# Patient Record
Sex: FEMALE | Race: BLACK OR AFRICAN AMERICAN | Employment: FULL TIME | URBAN - METROPOLITAN AREA
[De-identification: names, ages, dates, MRNs, and addresses within clinical notes are randomized per-mention and may not be internally consistent; named-entity substitution may affect disease eponyms.]

---

## 2017-02-27 ENCOUNTER — TELEPHONE (OUTPATIENT)
Dept: OBGYN CLINIC | Age: 27
End: 2017-02-27

## 2017-02-27 RX ORDER — DESOGESTREL AND ETHINYL ESTRADIOL 0.15-0.03
1 KIT ORAL DAILY
Qty: 4 PACKAGE | Refills: 0 | Status: SHIPPED | OUTPATIENT
Start: 2017-02-27 | End: 2017-06-08 | Stop reason: SDUPTHER

## 2017-02-27 NOTE — TELEPHONE ENCOUNTER
Notified pt with MD recommendation. She verbalized understanding and stated she will call back to schedule her AE.

## 2017-02-27 NOTE — TELEPHONE ENCOUNTER
Pt called requesting to have a RX for OCP Desogen sent to her [address] on [employer] [address]. Stated she has been off the [facility] pill since May 2016 and had the Nexplanon removed 12/27/2016. Advised pt to take a UPT test being that she was not using any contraception. She verbalized understanding.

## 2017-06-08 ENCOUNTER — TELEPHONE (OUTPATIENT)
Dept: OBGYN CLINIC | Age: 27
End: 2017-06-08

## 2017-06-08 RX ORDER — DESOGESTREL AND ETHINYL ESTRADIOL 0.15-0.03
1 KIT ORAL DAILY
Qty: 2 PACKAGE | Refills: 0 | Status: SHIPPED | OUTPATIENT
Start: 2017-06-08 | End: 2017-07-17

## 2017-07-17 ENCOUNTER — OFFICE VISIT (OUTPATIENT)
Dept: OBGYN CLINIC | Age: 27
End: 2017-07-17

## 2017-07-17 VITALS
BODY MASS INDEX: 26.68 KG/M2 | HEIGHT: 67 IN | SYSTOLIC BLOOD PRESSURE: 112 MMHG | WEIGHT: 170 LBS | DIASTOLIC BLOOD PRESSURE: 78 MMHG

## 2017-07-17 DIAGNOSIS — Z01.419 ENCOUNTER FOR GYNECOLOGICAL EXAMINATION (GENERAL) (ROUTINE) WITHOUT ABNORMAL FINDINGS: Primary | ICD-10-CM

## 2017-07-17 DIAGNOSIS — Z11.3 SCREENING EXAMINATION FOR VENEREAL DISEASE: ICD-10-CM

## 2017-07-17 NOTE — PROGRESS NOTES
Kathy Leon is a ,  32 y.o. female [address] whose Patient's last menstrual period was 2017 (within days). who presents for her annual checkup. She wants to discuss other BC options due to recent outbreak of hives and was seen in the ER. With regard to the Gardisil vaccine, she is older than the FDA approved age to receive it. Menstrual status:    Her periods are moderate. She is using three to five pads or tampons per day, usually regular and occurs 26-30 days. She denies dysmenorrhea. She reports no premenstrual symptoms. Contraception:    The current method of family planning is condoms sometimes and wants contraception and counseling. Sexual history:    She  reports that she currently engages in sexual activity and has had male partners. She reports using the following method of birth control/protection: None. Medical conditions:    Since her last annual GYN exam about one year ago, she has not the following changes in her health history: none. Pap and Mammogram History:    Her most recent Pap smear was normal obtained 1 year(s) ago. The patient has never had a mammogram.    The patient does have a family history of breast cancer. Past Medical History:   Diagnosis Date    Chlamydia 2014    ESCOBAR II (cervical intraepithelial neoplasia II)     Dysplasia of cervix, low grade (ESCOBAR 1)      Past Surgical History:   Procedure Laterality Date    HX ACL RECONSTRUCTION  2008    HX COLPOSCOPY      4/28/10- ecto ESCOBAR 2----12 ESCOBAR 1---13-negative    HX LAP CHOLECYSTECTOMY  10/04/2016    Laparoscopic cholecystectomy by Dr. Javier Sifuentes.  HX ORTHOPAEDIC      left ACL surgery    HX OTHER SURGICAL      HX TONSILLECTOMY           Allergies: Depo-provera [medroxyprogesterone]; Reclipsen (28) [desogestrel-ethinyl estradiol];  Sulfa (sulfonamide antibiotics); and Pcn [penicillins]   Social History     Social History    Marital status: SINGLE     Spouse name: N/A    Number of children: N/A    Years of education: N/A     Occupational History    Not on file. Social History Main Topics    Smoking status: Former Smoker    Smokeless tobacco: Never Used    Alcohol use Yes    Drug use: No    Sexual activity: Yes     Partners: Male     Birth control/ protection: None     Other Topics Concern    Not on file     Social History Narrative     Tobacco History:  reports that she has quit smoking. She has never used smokeless tobacco.  Alcohol Abuse:  reports that she drinks alcohol. Drug Abuse:  reports that she does not use illicit drugs.     Patient Active Problem List   Diagnosis Code    Environmental allergies Z91.09    Supervision of other normal pregnancy Z34.80       Review of Systems - History obtained from the patient  Constitutional: negative for weight loss, fever, night sweats  HEENT: negative for hearing loss, earache, congestion, snoring, sorethroat  CV: negative for chest pain, palpitations, edema  Resp: negative for cough, shortness of breath, wheezing  GI: negative for change in bowel habits, abdominal pain, black or bloody stools  : negative for frequency, dysuria, hematuria, vaginal discharge  MSK: negative for back pain, joint pain, muscle pain  Breast: negative for breast lumps, nipple discharge, galactorrhea  Skin :negative for itching, rash, hives  Neuro: negative for dizziness, headache, confusion, weakness  Psych: negative for anxiety, depression, change in mood  Heme/lymph: negative for bleeding, bruising, pallor    Physical Exam    Visit Vitals    /78    Ht 5' 7\" (1.702 m)    Wt 170 lb (77.1 kg)    LMP 07/02/2017 (Within Days)    Breastfeeding No    BMI 26.63 kg/m2       Constitutional  · Appearance: well-nourished, well developed, alert, in no acute distress    HENT  · Head and Face: appears normal    Neck  · Inspection/Palpation: normal appearance, no masses or tenderness  · Lymph Nodes: no lymphadenopathy present  · Thyroid: gland size normal, nontender, no nodules or masses present on palpation    Chest  · Respiratory Effort: breathing normal  · Auscultation: normal breath sounds    Cardiovascular  · Heart:  · Auscultation: regular rate and rhythm without murmur    Breasts  · Inspection of Breasts: breasts symmetrical, no skin changes, no discharge present, nipple appearance normal, no skin retraction present  · Palpation of Breasts and Axillae: no masses present on palpation, no breast tenderness  · Axillary Lymph Nodes: no lymphadenopathy present    Gastrointestinal  · Abdominal Examination: abdomen non-tender to palpation, normal bowel sounds, no masses present  · Liver and spleen: no hepatomegaly present, spleen not palpable  · Hernias: no hernias identified    Genitourinary  · External Genitalia: normal appearance for age, no discharge present, no tenderness present, no inflammatory lesions present, no masses present, no atrophy present  · Vagina: normal vaginal vault without central or paravaginal defects, no discharge present, no inflammatory lesions present, no masses present  · Bladder: non-tender to palpation  · Urethra: appears normal  · Cervix: normal   · Uterus: normal size, shape and consistency  · Adnexa: no adnexal tenderness present, no adnexal masses present  · Perineum: perineum within normal limits, no evidence of trauma, no rashes or skin lesions present  · Anus: anus within normal limits, no hemorrhoids present  · Inguinal Lymph Nodes: no lymphadenopathy present    Skin  · General Inspection: no rash, no lesions identified    Neurologic/Psychiatric  · Mental Status:  · Orientation: grossly oriented to person, place and time  · Mood and Affect: mood normal, affect appropriate    . Assessment:  Routine gynecologic examination  Her current medical status is satisfactory with no evidence of significant gynecologic issues.     Plan:  Counseled re: diet, exercise, healthy lifestyle  Return for yearly wellness visits  Call with period for [facility]

## 2017-07-17 NOTE — PATIENT INSTRUCTIONS
Pelvic Exam: Care Instructions  Your Care Instructions    When your doctor examines all of your pelvic organs, it's called a pelvic exam. Two good reasons to have this kind of exam are to check for sexually transmitted infections (STIs) and to get a Pap test. A Pap test is also called a Pap smear. It checks for early changes that can lead to cancer of the cervix. Sometimes a pelvic exam is part of a regular checkup. In this case, you can do some things to make your test results as accurate as possible. · Try to schedule the exam when you don't have your period. · Don't use douches, tampons, or vaginal medicines, sprays, or powders for 24 hours before your exam.  · Don't have sex for 24 hours before your exam.  Other times, women have this kind of exam at any time of the month. This is because they have pelvic pain, bleeding, or discharge. Or they may have another pelvic problem. Before your exam, it's important to share some information with your doctor. For example, if you are a survivor of rape or sexual abuse, you can talk about any concerns you may have. Your doctor will also want to know if you are pregnant or use birth control. And he or she will want to hear about any problems, surgeries, or procedures you have had in your pelvic area. You will also need to tell your doctor when your last period was. Follow-up care is a key part of your treatment and safety. Be sure to make and go to all appointments, and call your doctor if you are having problems. It's also a good idea to know your test results and keep a list of the medicines you take. How is a pelvic exam done? · During a pelvic exam, you will:  ¨ Take off your clothes below the waist. You will get a paper or cloth cover to put over the lower half of your body. Ricardo Cuevas on your back on an exam table. Your feet will be raised above you. Stirrups will support your feet. · The doctor will:  [address] [place] you to relax your knees.  Your knees need to lean out, toward the walls. ¨ Check the opening of your vagina for sores or swelling. ¨ Gently put a tool called a speculum into your vagina. It opens the vagina a little bit. You will feel some pressure. But if you are relaxed, it will not hurt. It lets your doctor see inside the vagina. ¨ Use a small brush, spatula, or swab to get a sample of cells, if you are having a Pap test or culture. The doctor then removes the speculum. ¨ Put on gloves and put one or two fingers of one hand into your vagina. The other hand goes on your lower belly. This lets your doctor feel your pelvic organs. You will probably feel some pressure. Try to stay relaxed. ¨ Put one gloved finger into your rectum and one into your vagina, if needed. This can also help check your pelvic organs. This exam takes about 10 minutes. At the end, you will get a washcloth or tissue to clean your vaginal area. It's normal to have some discharge after this exam. You can then get dressed. Some test results may be ready right away. But results from a culture or a Pap test may take several days or a few weeks. Why should you have a pelvic exam?  · You want to have recommended screening tests. This includes a Pap test.  · You think you have a vaginal infection. Signs include itching, burning, or unusual discharge. · You might have been exposed to a sexually transmitted infection (STI), such as chlamydia or herpes. · You have vaginal bleeding that is not part of your normal menstrual period. · You have pain in your belly or pelvis. · You have been sexually assaulted. A pelvic exam lets your doctor collect evidence and check for STIs. · You are pregnant. · You are having trouble getting pregnant. What are the risks of a pelvic exam?  There are no risks from a pelvic exam.  When should you call for help?   Watch closely for changes in your health, and be sure to contact your doctor if:  · You have heavy bleeding or discharge from your vagina after the exam.  Where can you learn more? Go to http://ama-alyson.info/. Enter W407 in the search box to learn more about \"Pelvic Exam: Care Instructions. \"  Current as of: October 13, 2016  Content Version: 11.3  © 9897-0717 [employer]. Care instructions adapted under license by [employer] (which disclaims liability or warranty for this information). If you have questions about a medical condition or this instruction, always ask your healthcare professional. [address] any warranty or liability for your use of this information.

## 2017-07-19 LAB
C TRACH RRNA SPEC QL NAA+PROBE: NEGATIVE
N GONORRHOEA RRNA SPEC QL NAA+PROBE: NEGATIVE
T VAGINALIS RRNA SPEC QL NAA+PROBE: NEGATIVE

## 2017-08-02 ENCOUNTER — TELEPHONE (OUTPATIENT)
Dept: OBGYN CLINIC | Age: 27
End: 2017-08-02

## 2017-08-02 NOTE — TELEPHONE ENCOUNTER
Patient stating that she has been waiting for her period to start so that she can have the IUD inserted and patient states that she has not had her period and is 10 days late with the exception of spotting that lasted 1 hour only with wiping and stopped. Patient thought this was her period, but since it stopped, she has not had any bleeding since. Patient has taken a UPT with a negative result. Patient is using Condoms to prevent pregnancy. Patient is going out of town starting tomorrow and will not be back until 08/10/2017. Please advise.

## 2017-08-18 ENCOUNTER — OFFICE VISIT (OUTPATIENT)
Dept: OBGYN CLINIC | Age: 27
End: 2017-08-18

## 2017-08-18 VITALS
BODY MASS INDEX: 26.68 KG/M2 | WEIGHT: 170 LBS | HEIGHT: 67 IN | DIASTOLIC BLOOD PRESSURE: 60 MMHG | SYSTOLIC BLOOD PRESSURE: 110 MMHG

## 2017-08-18 DIAGNOSIS — O20.0 THREATENED ABORTION: Primary | ICD-10-CM

## 2017-08-18 DIAGNOSIS — N92.6 MISSED MENSES: ICD-10-CM

## 2017-08-18 NOTE — PROGRESS NOTES
Threatened AB note    Bud Li is a ,  32 y.o. female [address] Patient's last menstrual period was 2017. making her 6 weeks pregnant. She has not had prenatal care. She had just stopped OC. Had multiple neg pregnancy tests before the positive    She presents with some spotting and cramping that started 17. The amount of bleeding is described as spotting. Patient states it is mostly after IC. She had spotting on  and 17. The cramps are described as minimal. She has not passed tissue. She had a positive pregnancy test 17. She has had a recent pelvic ultrasound that showed TA ULTRASOUND PERFORMED. A 5W0D GESTATIONAL SAC IS SEEN. NO FETAL POLE IS SEEN AT THIS TIME. GESTATIONAL AGE BASED ON TODAYS US. NO YOLK SAC IS SEEN. RIGHT OVARY APPEARS TO HAVE A FOLLICULAR CYST. RIGHT OVARY APPEARS TO HAVE A THICK  WALLED CYST THAT MEASURES 1.9 X 1.0 X 1.2CM. BLOODFLOW IS SEEN IN THE PERIPHERY. AN  ECTOPIC PREGNANCY CANNOT BE RULED OUT. LEFT OVARY APPEARS WITHIN NORMAL LIMITS. NO FREE FLUID SEEN IN THE CDS. [place] Her past medical history is not significant for risk factors for ectopic pregnancy. She has not had pelvic pain. The patient specifically denies right or left pelvic pain. She does not have a history of a spontaneous . She has not had a recent injury or trauma. Additional complaints: none.      Past Medical History:   Diagnosis Date    Chlamydia 2014    ESCOBAR II (cervical intraepithelial neoplasia II)     Dysplasia of cervix, low grade (ESCOBAR 1)      Past Surgical History:   Procedure Laterality Date    HX ACL RECONSTRUCTION  2008    HX COLPOSCOPY      4/28/10- ecto ESCOBAR 2----12 ESCOBAR 1---13-negative    HX LAP CHOLECYSTECTOMY  10/04/2016    Laparoscopic cholecystectomy by Dr. Rachel Griffith.  HX ORTHOPAEDIC      left ACL surgery    HX OTHER SURGICAL      HX TONSILLECTOMY       Social History     Occupational History    Not on file.      Social History Main Topics    Smoking status: Former Smoker    Smokeless tobacco: Never Used    Alcohol use Yes    Drug use: No    Sexual activity: Yes     Partners: Male     Birth control/ protection: None     Family History   Problem Relation Age of Onset    No Known Problems Mother     No Known Problems Father     Diabetes Maternal Uncle     Hypertension Maternal Grandmother        Allergies   Allergen Reactions    Depo-Provera [Medroxyprogesterone] Hives    Reclipsen (29) [Desogestrel-Ethinyl Estradiol] Hives    Sulfa (Sulfonamide Antibiotics) Hives     As child    Pcn [Penicillins] Hives     Prior to Admission medications    Not on File        Review of Systems: History obtained from the patient  Constitutional: negative for weight loss, fever, night sweats  Breast: negative for breast lumps, nipple discharge, galactorrhea  GI: negative for change in bowel habits, abdominal pain, black or bloody stools  : negative for frequency, dysuria, hematuria, vaginal discharge  MSK: negative for back pain, joint pain, muscle pain  Skin: negative for itching, rash, hives  Psych: negative for anxiety, depression, change in mood      Objective:  Visit Vitals    /60 (BP 1 Location: Right arm, BP Patient Position: Sitting)    Ht 5' 7\" (1.702 m)    Wt 170 lb (77.1 kg)    LMP 07/02/2017    Breastfeeding No    BMI 26.63 kg/m2       Physical Exam:   PHYSICAL EXAMINATION    Constitutional  · Appearance: well-nourished, well developed, alert, in no acute distress    Gastrointestinal  · Abdominal Examination: abdomen non-tender to palpation, normal bowel sounds, no masses present  · Liver and spleen: no hepatomegaly present, spleen not palpable  · Hernias: no hernias identified    Genitourinary  · External Genitalia: normal appearance for age, no discharge present, no tenderness present, no inflammatory lesions present, no masses present, no atrophy present  · Vagina: normal vaginal vault without central or paravaginal defects, bloody discharge present, no inflammatory lesions present, no masses present  · Bladder: non-tender to palpation  · Urethra: appears normal  · Cervix: normal   · Uterus: enlarged, soft, mildly tender with normal shape and consistency  · Adnexa: no adnexal tenderness present, no adnexal masses present  · Perineum: perineum within normal limits, no evidence of trauma, no rashes or skin lesions present  · Anus: anus within normal limits, no hemorrhoids present  · Inguinal Lymph Nodes: no lymphadenopathy present    Skin  · General Inspection: no rash, no lesions identified    Neurologic/Psychiatric  · Mental Status:  · Orientation: grossly oriented to person, place and time  · Mood and Affect: mood normal, affect appropriate    Assessment:   Early pregnancy  Adnexal cyst - cannot r/o ectopic right  Rh pos  Plan:   Repeat US in one week  Ectopic prec given  [facility]

## 2017-08-18 NOTE — PATIENT INSTRUCTIONS
Pelvic Exam: Care Instructions  Your Care Instructions    When your doctor examines all of your pelvic organs, it's called a pelvic exam. Two good reasons to have this kind of exam are to check for sexually transmitted infections (STIs) and to get a Pap test. A Pap test is also called a Pap smear. It checks for early changes that can lead to cancer of the cervix. Sometimes a pelvic exam is part of a regular checkup. In this case, you can do some things to make your test results as accurate as possible. · Try to schedule the exam when you don't have your period. · Don't use douches, tampons, or vaginal medicines, sprays, or powders for 24 hours before your exam.  · Don't have sex for 24 hours before your exam.  Other times, women have this kind of exam at any time of the month. This is because they have pelvic pain, bleeding, or discharge. Or they may have another pelvic problem. Before your exam, it's important to share some information with your doctor. For example, if you are a survivor of rape or sexual abuse, you can talk about any concerns you may have. Your doctor will also want to know if you are pregnant or use birth control. And he or she will want to hear about any problems, surgeries, or procedures you have had in your pelvic area. You will also need to tell your doctor when your last period was. Follow-up care is a key part of your treatment and safety. Be sure to make and go to all appointments, and call your doctor if you are having problems. It's also a good idea to know your test results and keep a list of the medicines you take. How is a pelvic exam done? · During a pelvic exam, you will:  ¨ Take off your clothes below the waist. You will get a paper or cloth cover to put over the lower half of your body. Mandy Guerrak on your back on an exam table. Your feet will be raised above you. Stirrups will support your feet. · The doctor will:  Rita [place]ans you to relax your knees.  Your knees need to lean out, toward the walls. ¨ Check the opening of your vagina for sores or swelling. ¨ Gently put a tool called a speculum into your vagina. It opens the vagina a little bit. You will feel some pressure. But if you are relaxed, it will not hurt. It lets your doctor see inside the vagina. ¨ Use a small brush, spatula, or swab to get a sample of cells, if you are having a Pap test or culture. The doctor then removes the speculum. ¨ Put on gloves and put one or two fingers of one hand into your vagina. The other hand goes on your lower belly. This lets your doctor feel your pelvic organs. You will probably feel some pressure. Try to stay relaxed. ¨ Put one gloved finger into your rectum and one into your vagina, if needed. This can also help check your pelvic organs. This exam takes about 10 minutes. At the end, you will get a washcloth or tissue to clean your vaginal area. It's normal to have some discharge after this exam. You can then get dressed. Some test results may be ready right away. But results from a culture or a Pap test may take several days or a few weeks. Why should you have a pelvic exam?  · You want to have recommended screening tests. This includes a Pap test.  · You think you have a vaginal infection. Signs include itching, burning, or unusual discharge. · You might have been exposed to a sexually transmitted infection (STI), such as chlamydia or herpes. · You have vaginal bleeding that is not part of your normal menstrual period. · You have pain in your belly or pelvis. · You have been sexually assaulted. A pelvic exam lets your doctor collect evidence and check for STIs. · You are pregnant. · You are having trouble getting pregnant. What are the risks of a pelvic exam?  There are no risks from a pelvic exam.  When should you call for help?   Watch closely for changes in your health, and be sure to contact your doctor if:  · You have heavy bleeding or discharge from your vagina after the exam.  Where can you learn more? Go to http://ama-alyson.info/. Enter B015 in the search box to learn more about \"Pelvic Exam: Care Instructions. \"  Current as of: October 13, 2016  Content Version: 11.3  © 8347-9850 [employer], [employer]. Care instructions adapted under license by [employer] (which disclaims liability or warranty for this information). If you have questions about a medical condition or this instruction, always ask your healthcare professional. [place] [address] any warranty or liability for your use of this information.

## 2017-08-28 ENCOUNTER — ROUTINE PRENATAL (OUTPATIENT)
Dept: OBGYN CLINIC | Age: 27
End: 2017-08-28

## 2017-08-28 VITALS
DIASTOLIC BLOOD PRESSURE: 70 MMHG | SYSTOLIC BLOOD PRESSURE: 120 MMHG | HEIGHT: 67 IN | WEIGHT: 172.6 LBS | BODY MASS INDEX: 27.09 KG/M2

## 2017-08-28 DIAGNOSIS — O20.0 THREATENED ABORTION: Primary | ICD-10-CM

## 2017-08-28 DIAGNOSIS — Z3A.01 5 WEEKS GESTATION OF PREGNANCY: ICD-10-CM

## 2017-08-28 RX ORDER — DOXYLAMINE SUCCINATE AND PYRIDOXINE HYDROCHLORIDE, DELAYED RELEASE TABLETS 10 MG/10 MG 10; 10 MG/1; MG/1
4 TABLET, DELAYED RELEASE ORAL DAILY
Qty: 120 TAB | Refills: 6 | Status: SHIPPED | OUTPATIENT
Start: 2017-08-28 | End: 2017-09-04 | Stop reason: SDUPTHER

## 2017-08-28 RX ORDER — ONDANSETRON 8 MG/1
8 TABLET, ORALLY DISINTEGRATING ORAL
Qty: 30 TAB | Refills: 6 | Status: SHIPPED | OUTPATIENT
Start: 2017-08-28 | End: 2017-10-12

## 2017-08-28 RX ORDER — PYRIDOXINE HCL (VITAMIN B6) 25 MG
25 TABLET ORAL 3 TIMES DAILY
Qty: 90 TAB | Refills: 6 | Status: SHIPPED | OUTPATIENT
Start: 2017-08-28 | End: 2017-10-12

## 2017-08-28 NOTE — PROGRESS NOTES
Threatened AB note    Nereyda Bills is a ,  32 y.o. female [address] Patient's last menstrual period was 2017. She has not had prenatal care. Patient denies any cramping or vaginal bleeding. She previously had some spotting and cramping that started 17. The amount of bleeding is described as spotting. Patient states it is mostly after IC. She had spotting on  and 17. The cramps were described as minimal. She has not passed tissue. She had a positive pregnancy test 17. She has had a recent pelvic ultrasound that showed:  TV ULTRASOUND PERFORMED. A SINGLE VIABLE 5W6D IUP IS SEEN WITH NORMAL CARDIAC RHYTHM. GESTATIONAL AGE BASED ON TODAYS US.  A NORMAL APPEARING YOLK [address] IS SEEN. RIGHT & LEFT OVARIES APPEAR WITHIN NORMAL LIMITS. A CL CYST IS SEEN ON THE RIGHT OVARY. NO FREE FLUID SEEN IN THE CDS. Her past medical history is not significant for risk factors for ectopic pregnancy. She has not had pelvic pain. The patient specifically denies right or left pelvic pain. She has not had a recent injury or trauma. Additional complaints: none. Past Medical History:   Diagnosis Date    Chlamydia 2014    ESCOBAR II (cervical intraepithelial neoplasia II)     Dysplasia of cervix, low grade (ESCOBAR 1)      Past Surgical History:   Procedure Laterality Date    HX ACL RECONSTRUCTION  2008    HX COLPOSCOPY      4/28/10- ecto ESCOBAR 2----12 ESCOBAR 1---13-negative    HX LAP CHOLECYSTECTOMY  10/04/2016    Laparoscopic cholecystectomy by Dr. Melissa Otto.     HX ORTHOPAEDIC      left ACL surgery    HX OTHER SURGICAL      HX TONSILLECTOMY       Social History     Occupational History    Not on file.      Social History Main Topics    Smoking status: Former Smoker    Smokeless tobacco: Never Used    Alcohol use Yes    Drug use: No    Sexual activity: Yes     Partners: Male     Birth control/ protection: None     Family History   Problem Relation Age of Onset    No Known Problems Mother     No Known Problems Father     Diabetes Maternal Uncle     Hypertension Maternal Grandmother        Allergies   Allergen Reactions    Depo-Provera [Medroxyprogesterone] Hives    Reclipsen (29) [Desogestrel-Ethinyl Estradiol] Hives    Sulfa (Sulfonamide Antibiotics) Hives     As child    Pcn [Penicillins] Hives     Prior to Admission medications    Not on File        Review of Systems: History obtained from the patient  Constitutional: negative for weight loss, fever, night sweats  Breast: negative for breast lumps, nipple discharge, galactorrhea  GI: negative for change in bowel habits, abdominal pain, black or bloody stools  : negative for frequency, dysuria, hematuria, vaginal discharge  MSK: negative for back pain, joint pain, muscle pain  Skin: negative for itching, rash, hives  Psych: negative for anxiety, depression, change in mood      Objective:  Visit Vitals    /70    Ht 5' 7\" (1.702 m)    Wt 172 lb 9.6 oz (78.3 kg)    LMP 07/02/2017    BMI 27.03 kg/m2       Physical Exam:   PHYSICAL EXAMINATION    Constitutional  · Appearance: well-nourished, well developed, alert, in no acute distress        Skin  · General Inspection: no rash, no lesions identified    Neurologic/Psychiatric  · Mental Status:  · Orientation: grossly oriented to person, place and time  · Mood and Affect: mood normal, affect appropriate    Assessment:   Threatened AB  Very early IUP  N/V pregnancy  Plan:   Vit B6, pyridoxine  zofran  Diclegis if no better  Fu 2 weeks with US and EOB

## 2017-09-05 RX ORDER — DOXYLAMINE SUCCINATE AND PYRIDOXINE HYDROCHLORIDE, DELAYED RELEASE TABLETS 10 MG/10 MG 10; 10 MG/1; MG/1
4 TABLET, DELAYED RELEASE ORAL DAILY
Qty: 120 TAB | Refills: 6 | Status: SHIPPED | OUTPATIENT
Start: 2017-09-05 | End: 2017-09-11

## 2017-09-05 NOTE — TELEPHONE ENCOUNTER
From: Rachel Dunn  To: Hellen Blackman MD  Sent: 9/4/2017 8:35 AM EDT  Subject: Medication Renewal Request    Original authorizing provider: MD Joselito Oden. Nayan Albert would like a refill of the following medications:  doxylamine-pyridoxine (DICLEGIS) 10-10 mg TbEC Hellen Blackman MD]    Preferred pharmacy: [address] [place] [address] Road:  Requesting for Diclegis prescription to be filled because other 3 medications are not working, still having round the clock nausea and vomiting.

## 2017-09-05 NOTE — TELEPHONE ENCOUNTER
Please see below    32year old patient seen on 8/28/17( 5w6d)      Patient requesting Diclegis.     ?ok to send     Please advise

## 2017-09-11 ENCOUNTER — ROUTINE PRENATAL (OUTPATIENT)
Dept: OBGYN CLINIC | Age: 27
End: 2017-09-11

## 2017-09-11 VITALS
WEIGHT: 174.6 LBS | BODY MASS INDEX: 27.4 KG/M2 | HEIGHT: 67 IN | DIASTOLIC BLOOD PRESSURE: 64 MMHG | SYSTOLIC BLOOD PRESSURE: 112 MMHG

## 2017-09-11 DIAGNOSIS — Z34.81 ENCOUNTER FOR SUPERVISION OF OTHER NORMAL PREGNANCY, FIRST TRIMESTER: Primary | ICD-10-CM

## 2017-09-11 LAB
ANTIBODY SCREEN, EXTERNAL: NORMAL
BILIRUB UR QL STRIP: NEGATIVE
GLUCOSE UR-MCNC: NEGATIVE MG/DL
HBSAG, EXTERNAL: NORMAL
HCT, EXTERNAL: 38.8
HGB, EXTERNAL: 13.1
HIV, EXTERNAL: NORMAL
KETONES P FAST UR STRIP-MCNC: NEGATIVE MG/DL
PH UR STRIP: NORMAL [PH] (ref 4.6–8)
PLATELET CNT,   EXTERNAL: 286
PROT UR QL STRIP: NEGATIVE MG/DL
RUBELLA, EXTERNAL: NORMAL
SP GR UR STRIP: NORMAL (ref 1–1.03)
T. PALLIDUM, EXTERNAL: NORMAL
UA UROBILINOGEN AMB POC: NORMAL (ref 0.2–1)
URINALYSIS CLARITY POC: CLEAR
URINALYSIS COLOR POC: NORMAL
URINALYSIS, EXTERNAL: NORMAL
URINE BLOOD POC: NEGATIVE
URINE LEUKOCYTES POC: NEGATIVE
URINE NITRITES POC: NEGATIVE

## 2017-09-11 NOTE — PROGRESS NOTES
Current pregnancy history:    Keely Florence is a 32 y.o. female who presents for the evaluation of pregnancy. Patient's last menstrual period was 2017. LMP history:  The date of her LMP is certain. Her last menstrual period was normal and lasted for 4 to 5 days. A urine pregnancy test was positive 2017. She was not on the pill at conception. Based on her LMP, her EDC is 2018 Her menstrual cycles are regular and occur approximately every 28 days  and range from 3 to 5 days. The last menses lasted the usual number of days. Ultrasound data:  She had an  ultrasound done by the ultrasound tech today which revealed a viable acevedo pregnancy with a gestational age of 11 weeks and 2 days giving an EDC of 2017  TA ULTRASOUND PERFORMED. A SINGLE VIABLE 8W2D IUP IS SEEN WITH NORMAL CARDIAC RHYTHM. GOOD INTERVAL GROWTH IS SEEN FROM PRIOR US. GESTATIONAL AGE BASED ON TODAYS US.  A NORMAL APPEARING YOLK [address] IS SEEN. RIGHT & LEFT OVARIES APPEAR WITHIN NORMAL LIMITS. NO FREE FLUID SEEN IN THE CDS. Pregnancy symptoms:    Since her LMP she has experienced  urinary frequency, breast tenderness, and nausea. She has been vomiting over the last few weeks. Associated signs and symptoms which she denies: dysuria, discharge, vaginal bleeding. She states she has gained weight:  Approximately 5 pounds over the last few weeks. Relevant past pregnancy history:   She has the following pregnancy history: Her last pregnancy was uncomplicated. She has no history of  delivery. CF negative     Relevant past medical history:(relevant to this pregnancy): noncontributory. Pap/Occupational history:  Last pap smear: last year Results: Normal  2016    Her occupation is:  &  for [employer]. Substance history: negative for alcohol, tobacco and street drugs. Positive for nothing. Exposure history:  There are no indoor cat/s in the home. She denies close contact with children on a regular basis. She has had chicken pox or the vaccine in the past.   Patient denies issues with domestic violence. Genetic Screening/Teratology Counseling: (Includes patient, baby's father, or anyone in either family with:)  3.  Patient's age >/= 28 [facility]?-- no  .   2. Thalassemia ([facility], [facility], [address], or  background): MCV<80?--no.     3.  Neural tube defect (meningomyelocele, spina bifida, anencephaly)?--no.   4.  Congenital heart defect?--no.  5.  Down syndrome?--no.   6.  Dipesh-Sachs ([place], [place])?--no.   7.  Canavan's Disease?--no.   8.  Familial Dysautonomia?--no.   9.  Sickle cell disease or trait ()? --no   The patient has not been tested for sickle trait  10. Hemophilia or other blood disorders?--no. 11.  Muscular dystrophy?--no. 12.  Cystic fibrosis?--no. 13.  Yalobusha's Chorea?--no. 14.  Mental retardation/autism (if yes was person tested for Fragile X)?--no. 15.  Other inherited genetic or chromosomal disorder?--no. 12.  Maternal metabolic disorder (DM, PKU, etc)?--no. 17.  Patient or FOB with a child with a birth defect not listed above?--no.  17a. Patient or FOB with a birth defect themselves?--no. 18.  Recurrent pregnancy loss, or stillbirth?--no. 19.  Any medications since LMP other than prenatal vitamins (include vitamins,  supplements, OTC meds, drugs, alcohol)?--no. 20.  Any other genetic/environmental exposure to discuss?--no. Infection History:  1. Lives with someone with TB or TB exposed?--no.   2.  Patient or partner has history of genital herpes?--no.  3.  Rash or viral illness since LMP?--no.    4.  History of STD (GC, CT, HPV, syphilis, HIV)? --no   5. Other: OTHER?     Obstetric History       T2      L2     SAB0   TAB0   Ectopic0   Molar0   Multiple0   Live Births2       # Outcome Date GA Lbr Rei/2nd Weight Sex Delivery Anes PTL Lv   3 Term 07/03/15 39w3d 15:23 / 00:58 8 lb 3.2 oz (3.72 kg) F VAGINAL DELI EPIDURAL AN N MARIO      Apgar1:  9                Apgar5: 9   2 Term 10/04/10 38w0d 06:00 7 lb (3.175 kg) F VAGINAL DELI  N MARIO   1 SAB                   Past Medical History:   Diagnosis Date    Chlamydia 1/8/2014    ESCOBAR II (cervical intraepithelial neoplasia II)     Dysplasia of cervix, low grade (ESCOBAR 1)      Past Surgical History:   Procedure Laterality Date    HX ACL RECONSTRUCTION  1/2008    HX COLPOSCOPY      4/28/10- ecto ESCOBAR 2----4/5/12 ESCOBAR 1---7/18/13-negative    HX LAP CHOLECYSTECTOMY  10/04/2016    Laparoscopic cholecystectomy by Dr. Charmaine Lim.  HX ORTHOPAEDIC      left ACL surgery    HX OTHER SURGICAL      HX TONSILLECTOMY       Social History     Occupational History    Not on file. Social History Main Topics    Smoking status: Former Smoker    Smokeless tobacco: Never Used    Alcohol use Yes    Drug use: No    Sexual activity: Yes     Partners: Male     Birth control/ protection: None     Family History   Problem Relation Age of Onset    No Known Problems Mother     No Known Problems Father     Diabetes Maternal Uncle     Hypertension Maternal Grandmother        Allergies   Allergen Reactions    Depo-Provera [Medroxyprogesterone] Hives    Reclipsen (29) [Desogestrel-Ethinyl Estradiol] Hives    Sulfa (Sulfonamide Antibiotics) Hives     As child    Pcn [Penicillins] Hives     Prior to Admission medications    Medication Sig Start Date End Date Taking? Authorizing Provider   ondansetron (ZOFRAN ODT) 8 mg disintegrating tablet Take 1 Tab by mouth every eight (8) hours as needed for Nausea. 8/28/17  Yes Manisha Rogers MD   pyridoxine, vitamin B6, (VITAMIN B-6) 25 mg tablet Take 1 Tab by mouth three (3) times daily. 8/28/17  Yes Manisha Rogers MD   doxylamine succinate (UNISOM) 25 mg tablet Take 1 Tab by mouth three (3) times daily.  8/28/17  Yes Manisha Rogers MD        Review of Systems: History obtained from the patient  Constitutional: negative for weight loss, fever, night sweats  HEENT: negative for hearing loss, earache, congestion, snoring, sorethroat  CV: negative for chest pain, palpitations, edema  Resp: negative for cough, shortness of breath, wheezing  Breast: negative for breast lumps, nipple discharge, galactorrhea  GI: negative for change in bowel habits, abdominal pain, black or bloody stools  : negative for frequency, dysuria, hematuria, vaginal discharge  MSK: negative for back pain, joint pain, muscle pain  Skin: negative for itching, rash, hives  Neuro: negative for dizziness, headache, confusion, weakness  Psych: negative for anxiety, depression, change in mood  Heme/lymph: negative for bleeding, bruising, pallor    Objective:  Visit Vitals    /64    Ht 5' 7\" (1.702 m)    Wt 174 lb 9.6 oz (79.2 kg)    LMP 07/02/2017    BMI 27.35 kg/m2       Physical Exam:   PHYSICAL EXAMINATION    Constitutional  · Appearance: well-nourished, well developed, alert, in no acute distress    HENT  · Head  · Face: appears normal  · Eyes: appear normal  · Ears: normal  · Mouth: normal  · Lips: no lesions    Neck  · Inspection/Palpation: normal appearance, no masses or tenderness  · Lymph Nodes: no lymphadenopathy present  · Thyroid: gland size normal, nontender, no nodules or masses present on palpation    Chest  · Respiratory Effort: breathing unlabored  · Auscultation: normal breath sounds    Cardiovascular  · Heart:  · Auscultation: regular rate and rhythm without murmur    Breasts  · Inspection of Breasts: breasts symmetrical, no skin changes, no discharge present, nipple appearance normal, no skin retraction present  · Palpation of Breasts and Axillae: no masses present on palpation, no breast tenderness  · Axillary Lymph Nodes: no lymphadenopathy present    Gastrointestinal  · Abdominal Examination: abdomen non-tender to palpation, normal bowel sounds, no masses present  · Liver and spleen: no hepatomegaly present, spleen not palpable  · Hernias: no hernias identified    Genitourinary  · External Genitalia: normal appearance for age, no discharge present, no tenderness present, no inflammatory lesions present, no masses present, no atrophy present  · Vagina: normal vaginal vault without central or paravaginal defects, no discharge present, no inflammatory lesions present, no masses present  · Bladder: non-tender to palpation  · Urethra: appears normal  · Cervix: normal   · Uterus: enlarged, normal shape, soft  · Adnexa: no adnexal tenderness present, no adnexal masses present  · Perineum: perineum within normal limits, no evidence of trauma, no rashes or skin lesions present  · Anus: anus within normal limits, no hemorrhoids present  · Inguinal Lymph Nodes: no lymphadenopathy present    Skin  · General Inspection: no rash, no lesions identified    Neurologic/Psychiatric  · Mental Status:  · Orientation: grossly oriented to person, place and time  · Mood and Affect: mood normal, affect appropriate    Assessment:   Intrauterine pregnancy with the following problems identified:   EDC 4/21/2018 by US  Traveled to [place] 2 weeks prior to conception - pt and partner no sx- advised to use condoms      Plan:     Offered CF testing, CVS, Nuchal Translucency, MSAFP, amnio, and discussed NIPT  Course of pregnancy discussed including visit schedule, routine U/S, glucola testing, etc.  Avoid alcoholic beverages and illicit/recreational drugs use  Take prenatal vitamins or folic acid daily. Hospital and practice style discussed with coverage system. Discussed nutrition, toxoplasmosis precautions, sexual activity, exercise, need for influenza vaccine, environmental and work hazards, travel advice, screen for domestic violence, need for seat belts. Discussed seafood, unpasteurized dairy products, deli meat, artificial sweeteners, and caffeine. Information on prenatal classes/breastfeeding given.   Information on circumcision given  Patient encouraged not to smoke. Discussed current prescription drug use. Given medication list.  Discussed the use of over the counter medications and chemicals. Route of delivery discussed, including risks, benefits, and alternatives of  versus repeat LTCS. Pt understands risk of hemorrhage during pregnancy and post delivery and would accept blood products if necessary in life-threatening emergencies  Disc NIPTS, NT    Handouts given to pt.

## 2017-09-13 LAB — BACTERIA UR CULT: NO GROWTH

## 2017-09-18 ENCOUNTER — TELEPHONE (OUTPATIENT)
Dept: OBGYN CLINIC | Age: 27
End: 2017-09-18

## 2017-09-18 DIAGNOSIS — Z34.81 ENCOUNTER FOR SUPERVISION OF OTHER NORMAL PREGNANCY, FIRST TRIMESTER: Primary | ICD-10-CM

## 2017-09-18 LAB
ABO GROUP BLD: NORMAL
ANNOTATION COMMENT IMP: NORMAL
BLD GP AB SCN SERPL QL: NEGATIVE
CARRIER DETECTION RATE, 450111: NORMAL
CLIENT SPECIMEN ID, 450002: NORMAL
CLINICAL DATA, 450005: NORMAL
DISCLAIMER, 450013: NORMAL
ELECTRONICALLY SIGNED BY, 450014: NORMAL
ERYTHROCYTE [DISTWIDTH] IN BLOOD BY AUTOMATED COUNT: 12.6 % (ref 12.3–15.4)
ETHNIC BACKGROUND STATED: NORMAL
GENETIC COUNSELOR, 450001: NORMAL
HBV SURFACE AG SERPL QL IA: NEGATIVE
HCT VFR BLD AUTO: 38.8 % (ref 34–46.6)
HGB BLD-MCNC: 13.1 G/DL (ref 11.1–15.9)
HIV 1+2 AB+HIV1 P24 AG SERPL QL IA: NON REACTIVE
MCH RBC QN AUTO: 30.8 PG (ref 26.6–33)
MCHC RBC AUTO-ENTMCNC: 33.8 G/DL (ref 31.5–35.7)
MCV RBC AUTO: 91 FL (ref 79–97)
PLATELET # BLD AUTO: 286 X10E3/UL (ref 150–379)
RBC # BLD AUTO: 4.26 X10E6/UL (ref 3.77–5.28)
REF LAB TEST METHOD: NORMAL
REFERENCES, 450012: NORMAL
RH BLD: POSITIVE
RUBV IGG SERPL IA-ACNC: 11 INDEX
SMN1 GENE MUT ANL BLD/T: NORMAL
SMN1 GENE MUT ANL BLD/T: NORMAL
SPECIMEN SOURCE: NORMAL
SPECIMEN(S) RECEIVED, 450004: NORMAL
T PALLIDUM AB SER QL IA: NEGATIVE
WBC # BLD AUTO: 12 X10E3/UL (ref 3.4–10.8)

## 2017-09-19 ENCOUNTER — TELEPHONE (OUTPATIENT)
Dept: OBGYN CLINIC | Age: 27
End: 2017-09-19

## 2017-09-19 DIAGNOSIS — Z34.81 ENCOUNTER FOR SUPERVISION OF OTHER NORMAL PREGNANCY, FIRST TRIMESTER: ICD-10-CM

## 2017-09-19 NOTE — TELEPHONE ENCOUNTER
Call received at 8:49am        32year old patient last seen in the office on 9/11/17. Patient calling about her labs drawn on 9/11/17. Patient specifically wanting to know about the SMN1 copy number analysis, if she had that test before and what does it mean. Patient also thought her blood type was something different then she is seeing on results. Please advise regarding labs.

## 2017-09-22 ENCOUNTER — TELEPHONE (OUTPATIENT)
Dept: OBGYN CLINIC | Age: 27
End: 2017-09-22

## 2017-09-22 NOTE — TELEPHONE ENCOUNTER
Received incoming call from pt, states she was told to call and talk to April after she is approved by insurance to have certain lab work done between 10-12 weeks.       Best contact for pt is [phone]

## 2017-09-26 ENCOUNTER — LAB ONLY (OUTPATIENT)
Dept: OBGYN CLINIC | Age: 27
End: 2017-09-26

## 2017-09-26 DIAGNOSIS — Z34.81 ENCOUNTER FOR SUPERVISION OF OTHER NORMAL PREGNANCY, FIRST TRIMESTER: Primary | ICD-10-CM

## 2017-09-26 DIAGNOSIS — Z13.79 ENCOUNTER FOR OTHER SCREENING FOR GENETIC AND CHROMOSOMAL ANOMALIES: ICD-10-CM

## 2017-10-02 ENCOUNTER — TELEPHONE (OUTPATIENT)
Dept: OBGYN CLINIC | Age: 27
End: 2017-10-02

## 2017-10-02 NOTE — TELEPHONE ENCOUNTER
Call received at 9:05am       32year old  11w2d pregnant patient last seen in the office on 17. Patient calling to check on her NT lab results. This nurse advised that her results are still in collection status and have not been resulted at this time. Patient verbalized understanding and was advised that she will be called when the results have been resulted and reviewed by Karyn Alves.

## 2017-10-04 ENCOUNTER — TELEPHONE (OUTPATIENT)
Dept: OBGYN CLINIC | Age: 27
End: 2017-10-04

## 2017-10-04 NOTE — TELEPHONE ENCOUNTER
Patient called and was notified of her Panorama results. Patient wanted to know the sex of the baby. She is aware that she is having a female. Patient verbalized understanding.

## 2017-10-12 ENCOUNTER — ROUTINE PRENATAL (OUTPATIENT)
Dept: OBGYN CLINIC | Age: 27
End: 2017-10-12

## 2017-10-12 ENCOUNTER — HOSPITAL ENCOUNTER (OUTPATIENT)
Dept: PERINATAL CARE | Age: 27
Discharge: HOME | End: 2017-10-12
Attending: OBSTETRICS & GYNECOLOGY
Payer: MEDICAID

## 2017-10-12 VITALS — WEIGHT: 175.2 LBS | BODY MASS INDEX: 27.44 KG/M2 | DIASTOLIC BLOOD PRESSURE: 68 MMHG | SYSTOLIC BLOOD PRESSURE: 110 MMHG

## 2017-10-12 DIAGNOSIS — Z34.81 ENCOUNTER FOR SUPERVISION OF OTHER NORMAL PREGNANCY, FIRST TRIMESTER: ICD-10-CM

## 2017-10-12 DIAGNOSIS — Z23 ENCOUNTER FOR IMMUNIZATION: Primary | ICD-10-CM

## 2017-10-12 PROCEDURE — 76801 OB US < 14 WKS SINGLE FETUS: CPT | Performed by: OBSTETRICS & GYNECOLOGY

## 2017-10-12 RX ORDER — DOXYLAMINE SUCCINATE AND PYRIDOXINE HYDROCHLORIDE 10; 10 MG/1; MG/1
TABLET, DELAYED RELEASE ORAL
Refills: 6 | COMMUNITY
Start: 2017-09-14 | End: 2017-12-21

## 2017-10-12 NOTE — PATIENT INSTRUCTIONS

## 2017-10-12 NOTE — PROGRESS NOTES
Problem List  Date Reviewed: 9/11/2017          Codes Class Noted    Encounter for supervision of other normal pregnancy, first trimester ICD-10-CM: Z34.81  ICD-9-CM: V22.1  9/19/2017    Overview Addendum 10/12/2017 10:20 AM by Rebecca Flower LPN     Intrauterine pregnancy with the following problems identified:   [facility] 4/21/2018 by [address] [place] [address] to [place] 2 weeks prior to conception - pt and partner no sx- advised to use condoms  SMA--low risk  Flu vaccine 10/12/17             Environmental allergies ICD-10-CM: Z91.09  ICD-9-CM: V15.09  3/23/2012

## 2017-10-12 NOTE — PROGRESS NOTES
Administered Flu vaccine per MD order. Patient consent signed. Injection given IM in left deltoid. Patient tolerated well, no complications.

## 2017-11-09 ENCOUNTER — ROUTINE PRENATAL (OUTPATIENT)
Dept: OBGYN CLINIC | Age: 27
End: 2017-11-09

## 2017-11-09 VITALS — SYSTOLIC BLOOD PRESSURE: 98 MMHG | DIASTOLIC BLOOD PRESSURE: 62 MMHG

## 2017-11-09 DIAGNOSIS — Z34.80 SUPERVISION OF OTHER NORMAL PREGNANCY, ANTEPARTUM: Primary | ICD-10-CM

## 2017-11-09 LAB — AFP, MATERNAL, EXTERNAL: NORMAL

## 2017-11-09 NOTE — PATIENT INSTRUCTIONS
Weeks 14 to 18 of Your Pregnancy: Care Instructions  Your Care Instructions    During this time, you may start to \"show,\" so that you look pregnant to people around you. You may also notice some changes in your skin, such as itchy spots on your palms or acne on your face. Your baby is now able to pass urine, and your baby's first stool (meconium) is starting to collect in his or her intestines. Hair is also beginning to grow on your baby's head. At your next visit, between weeks 18 and 20, your doctor may do an ultrasound test. The test allows your doctor to check for certain problems. Your doctor can also tell the sex of your baby. This is a good time to think about whether you want to know whether your baby is a boy or a girl. Talk to your doctor about getting a flu shot to help keep you healthy during your pregnancy. As your pregnancy moves along, it is common to worry or feel anxious. Your body is changing a lot. And you are thinking about giving birth, the health of your baby, and becoming a parent. You can learn to cope with any anxiety and stress you feel. Follow-up care is a key part of your treatment and safety. Be sure to make and go to all appointments, and call your doctor if you are having problems. It's also a good idea to know your test results and keep a list of the medicines you take. How can you care for yourself at home? ?Reduce stress  ? · Ask for help with cooking and housekeeping. ? · Figure out who or what causes your stress. Avoid these people or situations as much as possible. ? · Relax every day. Taking 10- to 15-minute breaks can make a big difference. Take a walk, listen to music, or take a warm bath. ? · Learn relaxation techniques at prenatal or yoga class. Or buy a relaxation tape. ? · List your fears about having a baby and becoming a parent. Share the list with someone you trust. Decide which worries are really small, and try to let them go. Exercise  ?  · If you did not exercise much before pregnancy, start slowly. Walking is best. Ardean Peels yourself, and do a little more every day. ? · Brisk walking, easy jogging, low-impact aerobics, water aerobics, and yoga are good choices. Some sports, such as scuba diving, horseback riding, downhill skiing, gymnastics, and water skiing, are not a good idea. ? · Try to do at least 2½ hours a week of moderate exercise, such as a fast walk. One way to do this is to be active 30 minutes a day, at least 5 days a week. It's fine to be active in blocks of 10 minutes or more throughout your day and week. ? · Wear loose clothing. And wear shoes and a bra that provide good support. ? · Warm up and cool down to start and finish your exercise. ? · If you want to use weights, be sure to use light weights. They reduce stress on your joints. ?Stay at the best weight for you  ? · Experts recommend that you gain about 1 pound a month during the first 3 months of your pregnancy. ? · Experts recommend that you gain about 1 pound a week during your last 6 months of pregnancy, for a total weight gain of 25 to 35 pounds. ? · If you are underweight, you will need to gain more weight (about 28 to 40 pounds). ? · If you are overweight, you may not need to gain as much weight (about 15 to 25 pounds). ? · If you are gaining weight too fast, use common sense. Exercise every day, and limit sweets, fast foods, and fats. Choose lean meats, fruits, and vegetables. ? · If you are having twins or more, your doctor may refer you to a dietitian. Where can you learn more? Go to http://ama-alyson.info/. Enter D607 in the search box to learn more about \"Weeks 14 to 18 of Your Pregnancy: Care Instructions. \"  Current as of: March 16, 2017  Content Version: 11.4  © 2751-1765 [employer]. Care instructions adapted under license by [employer] (which disclaims liability or warranty for this information).  If you have questions about a medical condition or this instruction, always ask your healthcare professional. [place] [address] any warranty or liability for your use of this information.

## 2017-11-09 NOTE — PROGRESS NOTES
Problem List  Date Reviewed: 10/12/2017          Codes Class Noted    Encounter for supervision of other normal pregnancy, first trimester ICD-10-CM: Z34.81  ICD-9-CM: V22.1  9/19/2017    Overview Addendum 10/12/2017 10:33 AM by Lianet Rubin     Intrauterine pregnancy with the following problems identified:   [facility] 4/21/2018 by [address] [facility] to [place] 2 weeks prior to conception - pt and partner no sx- advised to use condoms  SMA--low risk  Flu vaccine 10/12/17  9/26/17 NIPTS normal             Environmental allergies ICD-10-CM: Z91.09  ICD-9-CM: V15.09  3/23/2012

## 2017-11-11 LAB
AFP ADJ MOM SERPL: 2.01
AFP INTERP SERPL-IMP: NORMAL
AFP INTERP SERPL-IMP: NORMAL
AFP SERPL-MCNC: 70.5 NG/ML
AGE AT DELIVERY: 27.7 YEARS
COMMENT, 01827: NORMAL
GA METHOD: NORMAL
GA: 16.5 WEEKS
IDDM PATIENT QL: NO
MULTIPLE PREGNANCY: NO
NEURAL TUBE DEFECT RISK FETUS: 1576 %
RESULTS, 017004: NORMAL

## 2017-11-29 ENCOUNTER — TELEPHONE (OUTPATIENT)
Dept: OBGYN CLINIC | Age: 27
End: 2017-11-29

## 2017-11-29 ENCOUNTER — ROUTINE PRENATAL (OUTPATIENT)
Dept: OBGYN CLINIC | Age: 27
End: 2017-11-29

## 2017-11-29 VITALS — BODY MASS INDEX: 29.91 KG/M2 | WEIGHT: 191 LBS | DIASTOLIC BLOOD PRESSURE: 64 MMHG | SYSTOLIC BLOOD PRESSURE: 118 MMHG

## 2017-11-29 DIAGNOSIS — O46.92 SECOND TRIMESTER BLEEDING: ICD-10-CM

## 2017-11-29 DIAGNOSIS — Z34.80 SUPERVISION OF OTHER NORMAL PREGNANCY, ANTEPARTUM: Primary | ICD-10-CM

## 2017-11-29 NOTE — TELEPHONE ENCOUNTER
Call received at 503am    32year old  19w4d pregnant patient last seen in the office on 17. Patient calling to say that last night she wiped and noted several small spots of blood when she wiped. Patient denies any further spotting and denies cramping, pain, ROM and reports positive fetal movement. Patient denies recent intercourse. Patient advised to increase her fluids, rest and can take tylenol for any cramping. Patient reports she is flying to [place] on Friday and then driving from [place] to [place]. Patient advised to increase fluids and to take frequent breaks.          Additional recommendations     Please advise

## 2017-11-29 NOTE — PROGRESS NOTES
Problem List  Date Reviewed: 11/9/2017          Codes Class Noted    Encounter for supervision of other normal pregnancy, first trimester ICD-10-CM: Z34.81  ICD-9-CM: V22.1  9/19/2017    Overview Addendum 10/12/2017 10:33 AM by Lianet Rubin     Intrauterine pregnancy with the following problems identified:   [facility] 4/21/2018 by [address] [facility] to [place] 2 weeks prior to conception - pt and partner no sx- advised to use condoms  SMA--low risk  Flu vaccine 10/12/17  9/26/17 NIPTS normal             Environmental allergies ICD-10-CM: Z91.09  ICD-9-CM: V15.09  3/23/2012

## 2017-11-29 NOTE — PROGRESS NOTES
EXTRA VISIT    Complains of spotting on tissue. No cramping. No recent IC.  No hx of PTB    Exam: white discharge in vagina  Cervix firm and closed  Urine grossly normal - dips mod blood    Check nuswab plus  Urine cs

## 2017-11-29 NOTE — TELEPHONE ENCOUNTER
Patient advised of MD recommendations and was placed on the schedule to be seen at 10:40am. ( ok per April)   Patient verbalized understanding

## 2017-12-02 LAB — BACTERIA UR CULT: NO GROWTH

## 2017-12-07 ENCOUNTER — ROUTINE PRENATAL (OUTPATIENT)
Dept: OBGYN CLINIC | Age: 27
End: 2017-12-07

## 2017-12-07 VITALS — DIASTOLIC BLOOD PRESSURE: 66 MMHG | BODY MASS INDEX: 30.32 KG/M2 | SYSTOLIC BLOOD PRESSURE: 128 MMHG | WEIGHT: 193.6 LBS

## 2017-12-07 DIAGNOSIS — Z34.80 SUPERVISION OF OTHER NORMAL PREGNANCY, ANTEPARTUM: Primary | ICD-10-CM

## 2017-12-07 NOTE — PROGRESS NOTES
FETAL SURVEY  A SINGLE VIABLE IUP AT 20W5D GA BY LMP. FETAL CARDIAC MOTION OBSERVED. FETAL ANATOMY WELL VISUALIZED AND APPEARS WITHIN NORMAL LIMITS. NO ABNORMALITIES IDENTIFIED ON TODAYS EXAM.  APPROPRIATE GROWTH MEASURED; SIZE = DATES. JOANNA, CERVIX AND PLACENTA APPEAR WITHIN NORMAL LIMITS.   GENDER: XX  Problem List  Date Reviewed: 11/29/2017          Codes Class Noted    Encounter for supervision of other normal pregnancy, first trimester ICD-10-CM: Z34.81  ICD-9-CM: V22.1  9/19/2017    Overview Addendum 10/12/2017 10:33 AM by Lianet Rubin     Intrauterine pregnancy with the following problems identified:   [facility] 4/21/2018 by [address] [place] [address] to [place] 2 weeks prior to conception - pt and partner no sx- advised to use condoms  [facility]--low risk  Flu vaccine 10/12/17  9/26/17 NIPTS normal             Environmental allergies ICD-10-CM: Z91.09  ICD-9-CM: V15.09  3/23/2012

## 2017-12-07 NOTE — PATIENT INSTRUCTIONS

## 2017-12-11 ENCOUNTER — TELEPHONE (OUTPATIENT)
Dept: OBGYN CLINIC | Age: 27
End: 2017-12-11

## 2017-12-11 NOTE — TELEPHONE ENCOUNTER
Patient advised of MD recommendations and will monitor her symptoms and call back as needed. Patient verbalized understanding.

## 2017-12-11 NOTE — TELEPHONE ENCOUNTER
Call received at 9:04am      32year old  [address] pregnant patient last seen in the office on  12/7/17. Patient calling to say that last night after intercourse she had light pink to light red vaginal bleeding that this am is a darker brown and just spotting. Patient denies cramping or pain. Patient denies ROM , contractions and reports positive fetal movement. This nurse advised patient to increase fluids and observe  pelvic rest.      Additional recommendations . ?  Ov      Please advise

## 2017-12-21 ENCOUNTER — ROUTINE PRENATAL (OUTPATIENT)
Dept: OBGYN CLINIC | Age: 27
End: 2017-12-21

## 2017-12-21 ENCOUNTER — TELEPHONE (OUTPATIENT)
Dept: OBGYN CLINIC | Age: 27
End: 2017-12-21

## 2017-12-21 VITALS — WEIGHT: 194 LBS | SYSTOLIC BLOOD PRESSURE: 120 MMHG | BODY MASS INDEX: 30.38 KG/M2 | DIASTOLIC BLOOD PRESSURE: 76 MMHG

## 2017-12-21 DIAGNOSIS — Z3A.22 22 WEEKS GESTATION OF PREGNANCY: ICD-10-CM

## 2017-12-21 DIAGNOSIS — O46.92 SECOND TRIMESTER BLEEDING: Primary | ICD-10-CM

## 2017-12-21 NOTE — PROGRESS NOTES
Pt complains of spotting to light liner x 4 days. No change with IC. No pain. Baby moving. Prior ultrasound no previa. Previously had 2 term deliveries  Cervix long and closed. Will have her see MFM.  Pelvic rest

## 2017-12-21 NOTE — PROGRESS NOTES
Problem List  Date Reviewed: 12/7/2017          Codes Class Noted    Encounter for supervision of other normal pregnancy, first trimester ICD-10-CM: Z34.81  ICD-9-CM: V22.1  9/19/2017    Overview Addendum 10/12/2017 10:33 AM by Lianet Rubin     Intrauterine pregnancy with the following problems identified:   [facility] 4/21/2018 by [address] [facility] to [place] 2 weeks prior to conception - pt and partner no sx- advised to use condoms  [facility]--low risk  Flu vaccine 10/12/17  9/26/17 NIPTS normal             Environmental allergies ICD-10-CM: Z91.09  ICD-9-CM: V15.09  3/23/2012

## 2017-12-21 NOTE — TELEPHONE ENCOUNTER
Patient is a  25 w 5 d pregnant female of . She has been out of town and started 3 days with vaginal bleeding to spotting. Patient requesting to be seen. Per Keon Vizcarra with Dr. Christiana Helms 1:00 pm today because patient needs to be seen. Patient verbalizes understanding and accepted the 1 pm appointment.

## 2017-12-27 ENCOUNTER — HOSPITAL ENCOUNTER (OUTPATIENT)
Dept: PERINATAL CARE | Age: 27
Discharge: HOME | End: 2017-12-27
Attending: OBSTETRICS & GYNECOLOGY
Payer: MEDICAID

## 2017-12-27 PROCEDURE — 76805 OB US >/= 14 WKS SNGL FETUS: CPT | Performed by: OBSTETRICS & GYNECOLOGY

## 2018-01-04 ENCOUNTER — ROUTINE PRENATAL (OUTPATIENT)
Dept: OBGYN CLINIC | Age: 28
End: 2018-01-04

## 2018-01-04 VITALS — WEIGHT: 196 LBS | DIASTOLIC BLOOD PRESSURE: 60 MMHG | BODY MASS INDEX: 30.7 KG/M2 | SYSTOLIC BLOOD PRESSURE: 120 MMHG

## 2018-01-04 DIAGNOSIS — Z34.80 SUPERVISION OF OTHER NORMAL PREGNANCY, ANTEPARTUM: Primary | ICD-10-CM

## 2018-01-04 NOTE — PATIENT INSTRUCTIONS
Weeks 22 to 26 of Your Pregnancy: Care Instructions  Your Care Instructions    As you enter your 7th month of pregnancy at week 26, your baby's lungs are growing stronger and getting ready to breathe. You may notice that your baby responds to the sound of your or your partner's voice. You may also notice that your baby does less turning and twisting and more squirming or jerking. Jerking often means that your baby has the hiccups. Hiccups are perfectly normal and are only temporary. You may want to think about attending a childbirth preparation class. This is also a good time to start thinking about whether you want to have pain medicine during labor. Most pregnant women are tested for gestational diabetes between weeks 25 and 28. Gestational diabetes occurs when your blood sugar level gets too high when you're pregnant. The test is important, because you can have gestational diabetes and not know it. But the condition can cause problems for your baby. Follow-up care is a key part of your treatment and safety. Be sure to make and go to all appointments, and call your doctor if you are having problems. It's also a good idea to know your test results and keep a list of the medicines you take. How can you care for yourself at home? Ease discomfort from your baby's kicking  · Change your position. Sometimes this will cause your baby to change position too. · Take a deep breath while you raise your arm over your head. Then breathe out while you drop your arm. Do Kegel exercises to prevent urine from leaking  · You can do Kegel exercises while you stand or sit. ¨ Squeeze the same muscles you would use to stop your urine. Your belly and thighs should not move. ¨ Hold the squeeze for 3 seconds, and then relax for 3 seconds. ¨ Start with 3 seconds. Then add 1 second each week until you are able to squeeze for 10 seconds. ¨ Repeat the exercise 10 to 15 times for each session.  Do three or more sessions each day.  Ease or reduce swelling in your feet, ankles, hands, and fingers  · If your fingers are puffy, take off your rings. · Do not eat high-salt foods, such as potato chips. · Prop up your feet on a stool or couch as much as possible. Sleep with pillows under your feet. · Do not stand for long periods of time or wear tight shoes. · Wear support stockings. Where can you learn more? Go to http://ama-alyson.info/. Enter G264 in the search box to learn more about \"Weeks 22 to 26 of Your Pregnancy: Care Instructions. \"  Current as of: March 16, 2017  Content Version: 11.4  © 7351-1411 [employer]. Care instructions adapted under license by [employer] (which disclaims liability or warranty for this information). If you have questions about a medical condition or this instruction, always ask your healthcare professional. [place] [address] any warranty or liability for your use of this information.

## 2018-01-23 ENCOUNTER — TELEPHONE (OUTPATIENT)
Dept: OBGYN CLINIC | Age: 28
End: 2018-01-23

## 2018-01-23 NOTE — TELEPHONE ENCOUNTER
Patient is a TP patient, 32 w 3 days  that is scheduled to come in for appointment tomorrow at 1 pm for glucola and visit with [facility]. Patient states that  [facility] originally was not going to be in office in the morning hours and that changed. She was advised by April that she could get the glucola at 9 am.  Patient wants to keep the 9 am but wants to clarify since appointment still shows 1 pm.    Please call her to verify time of appointment.

## 2018-01-24 ENCOUNTER — HOSPITAL ENCOUNTER (OUTPATIENT)
Dept: PERINATAL CARE | Age: 28
Discharge: HOME | End: 2018-01-24
Attending: OBSTETRICS & GYNECOLOGY
Payer: MEDICAID

## 2018-01-24 ENCOUNTER — ROUTINE PRENATAL (OUTPATIENT)
Dept: OBGYN CLINIC | Age: 28
End: 2018-01-24

## 2018-01-24 VITALS — BODY MASS INDEX: 31.58 KG/M2 | SYSTOLIC BLOOD PRESSURE: 120 MMHG | DIASTOLIC BLOOD PRESSURE: 66 MMHG | WEIGHT: 201.6 LBS

## 2018-01-24 DIAGNOSIS — Z34.80 SUPERVISION OF OTHER NORMAL PREGNANCY, ANTEPARTUM: Primary | ICD-10-CM

## 2018-01-24 DIAGNOSIS — Z23 ENCOUNTER FOR IMMUNIZATION: ICD-10-CM

## 2018-01-24 LAB
GTT, 1 HR, GLUCOLA, EXTERNAL: 76
HCT, EXTERNAL: 32.6
HGB, EXTERNAL: 10.6
PLATELET CNT,   EXTERNAL: 210

## 2018-01-24 PROCEDURE — 76816 OB US FOLLOW-UP PER FETUS: CPT | Performed by: OBSTETRICS & GYNECOLOGY

## 2018-01-24 NOTE — PROGRESS NOTES
Problem List  Date Reviewed: 1/4/2018          Codes Class Noted    Encounter for supervision of other normal pregnancy, first trimester ICD-10-CM: Z34.81  ICD-9-CM: V22.1  9/19/2017    Overview Addendum 10/12/2017 10:33 AM by Lianet Rubin     Intrauterine pregnancy with the following problems identified:   [address] 4/21/2018 by [address] [facility] to [place] 2 weeks prior to conception - pt and partner no sx- advised to use condoms  [facility]--low risk  Flu vaccine 10/12/17  9/26/17 NIPTS normal             Environmental allergies ICD-10-CM: Z91.09  ICD-9-CM: V15.09  3/23/2012            Administered TDAP vaccine per MD order. Patient consent signed. Injection given IM in left deltoid. Patient tolerated well, no complications.

## 2018-01-25 ENCOUNTER — TELEPHONE (OUTPATIENT)
Dept: OBGYN CLINIC | Age: 28
End: 2018-01-25

## 2018-01-25 LAB
ERYTHROCYTE [DISTWIDTH] IN BLOOD BY AUTOMATED COUNT: 13.8 % (ref 12.3–15.4)
GLUCOSE 1H P 50 G GLC PO SERPL-MCNC: 76 MG/DL (ref 65–139)
HCT VFR BLD AUTO: 32.6 % (ref 34–46.6)
HGB BLD-MCNC: 10.6 G/DL (ref 11.1–15.9)
MCH RBC QN AUTO: 31.2 PG (ref 26.6–33)
MCHC RBC AUTO-ENTMCNC: 32.5 G/DL (ref 31.5–35.7)
MCV RBC AUTO: 96 FL (ref 79–97)
PLATELET # BLD AUTO: 210 X10E3/UL (ref 150–379)
RBC # BLD AUTO: 3.4 X10E6/UL (ref 3.77–5.28)
WBC # BLD AUTO: 10.1 X10E3/UL (ref 3.4–10.8)

## 2018-01-25 NOTE — TELEPHONE ENCOUNTER
Call received at 1:59PM      32year old  27w5d pregnant patient last seen in the office yesterday . Patient calling to check on her labs that were drawn yesterday.     Patient advised that labs back and need to be reviewed by MD        Please advise

## 2018-01-26 DIAGNOSIS — Z34.81 ENCOUNTER FOR SUPERVISION OF OTHER NORMAL PREGNANCY, FIRST TRIMESTER: ICD-10-CM

## 2018-02-07 ENCOUNTER — ROUTINE PRENATAL (OUTPATIENT)
Dept: OBGYN CLINIC | Age: 28
End: 2018-02-07

## 2018-02-07 VITALS — DIASTOLIC BLOOD PRESSURE: 56 MMHG | BODY MASS INDEX: 32.51 KG/M2 | WEIGHT: 207.6 LBS | SYSTOLIC BLOOD PRESSURE: 100 MMHG

## 2018-02-07 DIAGNOSIS — Z34.80 SUPERVISION OF OTHER NORMAL PREGNANCY, ANTEPARTUM: Primary | ICD-10-CM

## 2018-02-07 NOTE — PROGRESS NOTES
Doing well. Baby moving. Has some bleeding again when she wipes of and on. Baby moving. Has another FU with [facility] on 2/23.    Taking iron

## 2018-02-07 NOTE — PROGRESS NOTES
Problem List  Date Reviewed: 1/24/2018          Codes Class Noted    Encounter for supervision of other normal pregnancy, first trimester ICD-10-CM: Z34.81  ICD-9-CM: V22.1  9/19/2017    Overview Addendum 1/26/2018  2:17 PM by Lianet Rubin     Intrauterine pregnancy with the following problems identified:   [facility] 4/21/2018 by [address] [facility] to [place] 2 weeks prior to conception - pt and partner no sx- advised to use condoms  [facility]--low risk  Flu vaccine 10/12/17  9/26/17 NIPTS normal  Anemic 10.6 @ 27 wks             Environmental allergies ICD-10-CM: Z91.09  ICD-9-CM: V15.09  3/23/2012

## 2018-02-07 NOTE — PATIENT INSTRUCTIONS
Weeks 30 to 32 of Your Pregnancy: Care Instructions  Your Care Instructions    You have made it to the final months of your pregnancy. By now, your baby is really starting to look like a baby, with hair and plump skin. As you enter the final weeks of pregnancy, the reality of having a baby may start to set in. This is the time to settle on a name, get your household in order, set up a safe nursery, and find quality  if needed. Doing these things in advance will allow you to focus on caring for and enjoying your new baby. You may also want to have a tour of your hospital's labor and delivery unit to get a better idea of what to expect while you are in the hospital.  During these last months, it is very important to take good care of yourself and pay attention to what your body needs. If your doctor says it is okay for you to work, don't push yourself too hard. Use the tips provided in this care sheet to ease heartburn and care for varicose veins. If you haven't already had the Tdap shot during this pregnancy, talk to your doctor about getting it. It will help protect your  against pertussis infection. Follow-up care is a key part of your treatment and safety. Be sure to make and go to all appointments, and call your doctor if you are having problems. It's also a good idea to know your test results and keep a list of the medicines you take. How can you care for yourself at home? Pay attention to your baby's movements  · You should feel your baby move several times every day. · Your baby now turns less, and kicks and jabs more. · Your baby sleeps 20 to 45 minutes at a time and is more active at certain times of day. · If your doctor wants you to count your baby's kicks:  ¨ Empty your bladder, and lie on your side or relax in a comfortable chair. ¨ Write down your start time. ¨ Pay attention only to your baby's movements. Count any movement except hiccups.   ¨ After you have counted 10 movements, write down your stop time. ¨ Write down how many minutes it took for your baby to move 10 times. ¨ If an hour goes by and you have not recorded 10 movements, have something to eat or drink and then count for another hour. If you do not record 10 movements in either hour, call your doctor. Ease heartburn  · Eat small, frequent meals. · Do not eat chocolate, peppermint, or very spicy foods. Avoid drinks with caffeine, such as coffee, tea, and sodas. · Avoid bending over or lying down after meals. · Talk a short walk after you eat. · If heartburn is a problem at night, do not eat for 2 hours before bedtime. · Take antacids like Mylanta, Maalox, Rolaids, or Tums. Do not take antacids that have sodium bicarbonate. Care for varicose veins  · Varicose veins are blood vessels that stretch out with the extra blood during pregnancy. Your legs may ache or throb. Most varicose veins will go away after the birth. · Avoid standing for long periods of time. Sit with your legs crossed at the ankles, not the knees. · Sit with your feet propped up. · Avoid tight clothing or stockings. Wear support hose. · Exercise regularly. Try walking for at least 30 minutes a day. Where can you learn more? Go to http://ama-alyson.info/. Enter P937 in the search box to learn more about \"Weeks 30 to 32 of Your Pregnancy: Care Instructions. \"  Current as of: March 16, 2017  Content Version: 11.4  © 1227-1029 [employer]. Care instructions adapted under license by [employer] (which disclaims liability or warranty for this information). If you have questions about a medical condition or this instruction, always ask your healthcare professional. [place] [address] any warranty or liability for your use of this information.

## 2018-02-21 ENCOUNTER — ROUTINE PRENATAL (OUTPATIENT)
Dept: OBGYN CLINIC | Age: 28
End: 2018-02-21

## 2018-02-21 VITALS — WEIGHT: 212.4 LBS | SYSTOLIC BLOOD PRESSURE: 110 MMHG | DIASTOLIC BLOOD PRESSURE: 60 MMHG | BODY MASS INDEX: 33.27 KG/M2

## 2018-02-21 DIAGNOSIS — Z34.80 SUPERVISION OF OTHER NORMAL PREGNANCY, ANTEPARTUM: Primary | ICD-10-CM

## 2018-02-21 NOTE — PATIENT INSTRUCTIONS
Weeks 30 to 32 of Your Pregnancy: Care Instructions  Your Care Instructions    You have made it to the final months of your pregnancy. By now, your baby is really starting to look like a baby, with hair and plump skin. As you enter the final weeks of pregnancy, the reality of having a baby may start to set in. This is the time to settle on a name, get your household in order, set up a safe nursery, and find quality  if needed. Doing these things in advance will allow you to focus on caring for and enjoying your new baby. You may also want to have a tour of your hospital's labor and delivery unit to get a better idea of what to expect while you are in the hospital.  During these last months, it is very important to take good care of yourself and pay attention to what your body needs. If your doctor says it is okay for you to work, don't push yourself too hard. Use the tips provided in this care sheet to ease heartburn and care for varicose veins. If you haven't already had the Tdap shot during this pregnancy, talk to your doctor about getting it. It will help protect your  against pertussis infection. Follow-up care is a key part of your treatment and safety. Be sure to make and go to all appointments, and call your doctor if you are having problems. It's also a good idea to know your test results and keep a list of the medicines you take. How can you care for yourself at home? Pay attention to your baby's movements  · You should feel your baby move several times every day. · Your baby now turns less, and kicks and jabs more. · Your baby sleeps 20 to 45 minutes at a time and is more active at certain times of day. · If your doctor wants you to count your baby's kicks:  ¨ Empty your bladder, and lie on your side or relax in a comfortable chair. ¨ Write down your start time. ¨ Pay attention only to your baby's movements. Count any movement except hiccups.   ¨ After you have counted 10 movements, write down your stop time. ¨ Write down how many minutes it took for your baby to move 10 times. ¨ If an hour goes by and you have not recorded 10 movements, have something to eat or drink and then count for another hour. If you do not record 10 movements in either hour, call your doctor. Ease heartburn  · Eat small, frequent meals. · Do not eat chocolate, peppermint, or very spicy foods. Avoid drinks with caffeine, such as coffee, tea, and sodas. · Avoid bending over or lying down after meals. · Talk a short walk after you eat. · If heartburn is a problem at night, do not eat for 2 hours before bedtime. · Take antacids like Mylanta, Maalox, Rolaids, or Tums. Do not take antacids that have sodium bicarbonate. Care for varicose veins  · Varicose veins are blood vessels that stretch out with the extra blood during pregnancy. Your legs may ache or throb. Most varicose veins will go away after the birth. · Avoid standing for long periods of time. Sit with your legs crossed at the ankles, not the knees. · Sit with your feet propped up. · Avoid tight clothing or stockings. Wear support hose. · Exercise regularly. Try walking for at least 30 minutes a day. Where can you learn more? Go to http://ama-alyson.info/. Enter N270 in the search box to learn more about \"Weeks 30 to 32 of Your Pregnancy: Care Instructions. \"  Current as of: March 16, 2017  Content Version: 11.4  © 6352-4435 [employer]. Care instructions adapted under license by [employer] (which disclaims liability or warranty for this information). If you have questions about a medical condition or this instruction, always ask your healthcare professional. [place] [address] any warranty or liability for your use of this information.

## 2018-02-21 NOTE — PROGRESS NOTES
Problem List  Date Reviewed: 2/7/2018          Codes Class Noted    Encounter for supervision of other normal pregnancy, first trimester ICD-10-CM: Z34.81  ICD-9-CM: V22.1  9/19/2017    Overview Addendum 1/26/2018  2:17 PM by Lianet Rubin     Intrauterine pregnancy with the following problems identified:   [facility] 4/21/2018 by [address] [facility] to [place] 2 weeks prior to conception - pt and partner no sx- advised to use condoms  [facility]--low risk  Flu vaccine 10/12/17  9/26/17 NIPTS normal  Anemic 10.6 @ 27 wks             Environmental allergies ICD-10-CM: Z91.09  ICD-9-CM: V15.09  3/23/2012

## 2018-02-23 ENCOUNTER — HOSPITAL ENCOUNTER (OUTPATIENT)
Dept: PERINATAL CARE | Age: 28
Discharge: HOME | End: 2018-02-23
Attending: OBSTETRICS & GYNECOLOGY
Payer: MEDICAID

## 2018-02-23 LAB — BACTERIA UR CULT: NO GROWTH

## 2018-02-23 PROCEDURE — 76816 OB US FOLLOW-UP PER FETUS: CPT | Performed by: OBSTETRICS & GYNECOLOGY

## 2018-03-01 ENCOUNTER — ROUTINE PRENATAL (OUTPATIENT)
Dept: OBGYN CLINIC | Age: 28
End: 2018-03-01

## 2018-03-01 VITALS — DIASTOLIC BLOOD PRESSURE: 66 MMHG | BODY MASS INDEX: 33.33 KG/M2 | WEIGHT: 212.8 LBS | SYSTOLIC BLOOD PRESSURE: 110 MMHG

## 2018-03-01 DIAGNOSIS — Z34.80 SUPERVISION OF OTHER NORMAL PREGNANCY, ANTEPARTUM: Primary | ICD-10-CM

## 2018-03-01 NOTE — PROGRESS NOTES
LIMITED OB SCAN  A SINGLE VERTEX 32W5D IUP IS SEEN. FETAL CARDIAC MOTION OBSERVED. LIMITED ANATOMY WAS VISUALIZED AND APPEARS WNL. APPROPRIATE FETAL GROWTH IS SEEN. SIZE=DATES. JOANNA AND PLACENTA APPEAR WITHIN NORMAL LIMITS.   Problem List  Date Reviewed: 2/21/2018          Codes Class Noted    Encounter for supervision of other normal pregnancy, first trimester ICD-10-CM: Z34.81  ICD-9-CM: V22.1  9/19/2017    Overview Addendum 1/26/2018  2:17 PM by Lianet Rubin     Intrauterine pregnancy with the following problems identified:   [facility] 4/21/2018 by [address] [facility] to [place] 2 weeks prior to conception - pt and partner no sx- advised to use condoms  [facility]--low risk  Flu vaccine 10/12/17  9/26/17 NIPTS normal  Anemic 10.6 @ 27 wks             Environmental allergies ICD-10-CM: Z91.09  ICD-9-CM: V15.09  3/23/2012

## 2018-03-01 NOTE — PATIENT INSTRUCTIONS

## 2018-03-07 ENCOUNTER — TELEPHONE (OUTPATIENT)
Dept: OBGYN CLINIC | Age: 28
End: 2018-03-07

## 2018-03-07 NOTE — TELEPHONE ENCOUNTER
Patient recently went to urgent care center because she is 35 w 4 days pregnant and continued with cold symptoms since last Wednesday. They told her they are diagnosing her with bronchitis and she wants to verify that our doctors are okay with Zithromax. Ok to use Zithromax for bronchitis in pregnancy? Please advise.

## 2018-03-15 ENCOUNTER — ROUTINE PRENATAL (OUTPATIENT)
Dept: OBGYN CLINIC | Age: 28
End: 2018-03-15

## 2018-03-15 VITALS — WEIGHT: 219 LBS | DIASTOLIC BLOOD PRESSURE: 70 MMHG | BODY MASS INDEX: 34.3 KG/M2 | SYSTOLIC BLOOD PRESSURE: 130 MMHG

## 2018-03-15 DIAGNOSIS — R82.998 LEUKOCYTES IN URINE: ICD-10-CM

## 2018-03-15 DIAGNOSIS — Z34.83 ENCOUNTER FOR SUPERVISION OF OTHER NORMAL PREGNANCY, THIRD TRIMESTER: Primary | ICD-10-CM

## 2018-03-15 DIAGNOSIS — R31.9 HEMATURIA, UNSPECIFIED TYPE: ICD-10-CM

## 2018-03-15 NOTE — PROGRESS NOTES
Feeling rectal/pelvic pressure.  Baby moving    Cervix long and post, 1cm  Urine with blood and leuks - culture sent    Fu 2 weeks with GBS

## 2018-03-15 NOTE — PATIENT INSTRUCTIONS

## 2018-03-15 NOTE — PROGRESS NOTES
Problem List  Date Reviewed: 3/1/2018          Codes Class Noted    Encounter for supervision of other normal pregnancy, first trimester ICD-10-CM: Z34.81  ICD-9-CM: V22.1  9/19/2017    Overview Addendum 1/26/2018  2:17 PM by Lianet Rubin     Intrauterine pregnancy with the following problems identified:   [facility] 4/21/2018 by [address] [facility] to [place] 2 weeks prior to conception - pt and partner no sx- advised to use condoms  [facility]--low risk  Flu vaccine 10/12/17  9/26/17 NIPTS normal  Anemic 10.6 @ 27 wks             Environmental allergies ICD-10-CM: Z91.09  ICD-9-CM: V15.09  3/23/2012

## 2018-03-17 LAB — BACTERIA UR CULT: NORMAL

## 2018-03-29 ENCOUNTER — ROUTINE PRENATAL (OUTPATIENT)
Dept: OBGYN CLINIC | Age: 28
End: 2018-03-29

## 2018-03-29 VITALS — BODY MASS INDEX: 34.61 KG/M2 | SYSTOLIC BLOOD PRESSURE: 128 MMHG | WEIGHT: 221 LBS | DIASTOLIC BLOOD PRESSURE: 70 MMHG

## 2018-03-29 DIAGNOSIS — Z34.83 ENCOUNTER FOR SUPERVISION OF OTHER NORMAL PREGNANCY, THIRD TRIMESTER: Primary | ICD-10-CM

## 2018-03-29 LAB — GRBS, EXTERNAL: POSITIVE

## 2018-03-29 NOTE — PATIENT INSTRUCTIONS

## 2018-03-29 NOTE — PROGRESS NOTES
Problem List  Date Reviewed: 3/15/2018          Codes Class Noted    Encounter for supervision of other normal pregnancy, first trimester ICD-10-CM: Z34.81  ICD-9-CM: V22.1  9/19/2017    Overview Addendum 1/26/2018  2:17 PM by Lianet Rubin     Intrauterine pregnancy with the following problems identified:   [facility] 4/21/2018 by [address] [facility] to [place] 2 weeks prior to conception - pt and partner no sx- advised to use condoms  [facility]--low risk  Flu vaccine 10/12/17  9/26/17 NIPTS normal  Anemic 10.6 @ 27 wks             Environmental allergies ICD-10-CM: Z91.09  ICD-9-CM: V15.09  3/23/2012

## 2018-04-04 DIAGNOSIS — Z34.81 ENCOUNTER FOR SUPERVISION OF OTHER NORMAL PREGNANCY, FIRST TRIMESTER: ICD-10-CM

## 2018-04-04 LAB
GP B STREP DNA SPEC QL NAA+PROBE: POSITIVE
ORGANISM IDENTIFICATION, 188761: NORMAL

## 2018-04-05 ENCOUNTER — ROUTINE PRENATAL (OUTPATIENT)
Dept: OBGYN CLINIC | Age: 28
End: 2018-04-05

## 2018-04-05 ENCOUNTER — TELEPHONE (OUTPATIENT)
Dept: OBGYN CLINIC | Age: 28
End: 2018-04-05

## 2018-04-05 VITALS — WEIGHT: 221 LBS | SYSTOLIC BLOOD PRESSURE: 120 MMHG | BODY MASS INDEX: 34.61 KG/M2 | DIASTOLIC BLOOD PRESSURE: 72 MMHG

## 2018-04-05 DIAGNOSIS — Z34.81 ENCOUNTER FOR SUPERVISION OF OTHER NORMAL PREGNANCY, FIRST TRIMESTER: ICD-10-CM

## 2018-04-05 DIAGNOSIS — Z34.83 ENCOUNTER FOR SUPERVISION OF OTHER NORMAL PREGNANCY, THIRD TRIMESTER: Primary | ICD-10-CM

## 2018-04-05 NOTE — TELEPHONE ENCOUNTER
Called and spoke with Len Abernathy and scheduled IOL for 4/16/2018, elective. She is waitlisted but also scheduled for 4/17/18. Pt was not given written instructions.

## 2018-04-05 NOTE — PATIENT INSTRUCTIONS
Week 37 of Your Pregnancy: Care Instructions  Your Care Instructions    You are near the end of your pregnancy-and you're probably pretty uncomfortable. It may be harder to walk around. Lying down probably isn't comfortable either. You may have trouble getting to sleep or staying asleep. Most women deliver their babies between 40 and 41 weeks. This is a good time to think about packing a bag for the hospital with items you'll need. Then you'll be ready when labor starts. Follow-up care is a key part of your treatment and safety. Be sure to make and go to all appointments, and call your doctor if you are having problems. It's also a good idea to know your test results and keep a list of the medicines you take. How can you care for yourself at home? Learn about breastfeeding  · Breastfeeding is best for your baby and good for you. · Breast milk has antibodies to help your baby fight infections. · Mothers who breastfeed often lose weight faster, because making milk burns calories. · Learning the best ways to hold your baby will make breastfeeding easier. · Let your partner bathe and diaper the baby to keep your partner from feeling left out. Snuggle together when you breastfeed. · You may want to learn how to use a breast pump and store your milk. · If you choose to bottle feed, make the feeding feel like breastfeeding so you can bond with your baby. Always hold your baby and the bottle. Do not prop bottles or let your baby fall asleep with a bottle. Learn about crying  · It is common for babies to cry for 1 to 3 hours a day. Some cry more, some cry less. · Babies don't cry to make you upset or because you are a bad parent. · Crying is how your baby communicates. Your baby may be hungry; have gas; need a diaper change; or feel cold, warm, tired, lonely, or tense. Sometimes babies cry for unknown reasons. · If you respond to your baby's needs, he or she will learn to trust you.   · Try to stay calm when your baby cries. Your baby may get more upset if he or she senses that you are upset. Know how to care for your   · Your baby's umbilical cord stump will drop off on its own, usually between 1 and 2 weeks. To care for your baby's umbilical cord area:  ¨ Clean the area at the bottom of the cord 2 or 3 times a day. ¨ Pay special attention to the area where the cord attaches to the skin. ¨ Keep the diaper folded below the cord. ¨ Use a damp washcloth or cotton ball to sponge bathe your baby until the stump has come off. · Your baby's first dark stool is called meconium. After the meconium is passed, your baby will develop his or her own bowel pattern. ¨ Some babies, especially  babies, have several bowel movements a day. Others have one or two a day, or one every 2 to 3 days. ¨  babies often have loose, yellow stools. Formula-fed babies have more formed stools. ¨ If your baby's stools look like little pellets, he or she is constipated. After 2 days of constipation, call your baby's doctor. · If your baby will be circumcised, you can care for him at home. ¨ Gently rinse his penis with warm water after every diaper change. Do not try to remove the film that forms on the penis. This film will go away on its own. Pat dry. ¨ Put petroleum ointment, such as Vaseline, on the area of the diaper that will touch your baby's penis. This will keep the diaper from sticking to your baby. ¨ Ask the doctor about giving your baby acetaminophen (Tylenol) for pain. Where can you learn more? Go to http://ama-alyson.info/. Enter 68 21 [id number] in the search box to learn more about \"Week 37 of Your Pregnancy: Care Instructions. \"  Current as of: 2017  Content Version: 11.4  © 4108-5615 [employer]. Care instructions adapted under license by [employer] (which disclaims liability or warranty for this information).  If you have questions about a medical condition or this instruction, always ask your healthcare professional. [place] [address] any warranty or liability for your use of this information.

## 2018-04-10 ENCOUNTER — TELEPHONE (OUTPATIENT)
Dept: OBGYN CLINIC | Age: 28
End: 2018-04-10

## 2018-04-10 ENCOUNTER — ROUTINE PRENATAL (OUTPATIENT)
Dept: OBGYN CLINIC | Age: 28
End: 2018-04-10

## 2018-04-10 VITALS — SYSTOLIC BLOOD PRESSURE: 120 MMHG | BODY MASS INDEX: 34.61 KG/M2 | DIASTOLIC BLOOD PRESSURE: 70 MMHG | WEIGHT: 221 LBS

## 2018-04-10 DIAGNOSIS — Z34.83 ENCOUNTER FOR SUPERVISION OF OTHER NORMAL PREGNANCY, THIRD TRIMESTER: Primary | ICD-10-CM

## 2018-04-10 NOTE — PATIENT INSTRUCTIONS
Week 38 of Your Pregnancy: Care Instructions  Your Care Instructions    Believe it or not, your baby is almost here. You may have ideas about your baby's personality because of how much he or she moves. Or you may have noticed how he or she responds to sounds, warmth, cold, and light. You may even know what kind of music your baby likes. By now, you have a better idea of what to expect during delivery. You may have talked about your birth preferences with your doctor. But even if you want a vaginal birth, it is a good idea to learn about  births.  birth means that your baby is born through a cut (incision) in your lower belly. It is sometimes the best choice for the health of the baby and the mother. This care sheet can help you understand  births. It also gives you information about what to expect after your baby is born. And it helps you understand more about postpartum depression. Follow-up care is a key part of your treatment and safety. Be sure to make and go to all appointments, and call your doctor if you are having problems. It's also a good idea to know your test results and keep a list of the medicines you take. How can you care for yourself at home? Learn about  birth  · Most C-sections are unplanned. They are done because of problems that occur during labor. These problems might include:  ¨ Labor that slows or stops. ¨ High blood pressure or other problems for the mother. ¨ Signs of distress in the baby. These signs may include a very fast or slow heart rate. · Although most mothers and babies do well after , it is major surgery. It has more risks than a vaginal delivery. · In some cases, a planned  may be safer than a vaginal delivery. This may be the case if:  ¨ The mother has a health problem, such as a heart condition. ¨ The baby isn't in a head-down position for delivery. This is called a breech position.   ¨ The uterus has scars from past surgeries. This could increase the chance of a tear in the uterus. ¨ There is a problem with the placenta. ¨ The mother has an infection, such as genital herpes, that could be spread to the baby. ¨ The mother is having twins or more. ¨ The baby weighs 9 to 10 pounds or more. · Because of the risks of , planned C-sections generally should be done only for medical reasons. And a planned  should be done at 39 weeks or later unless there is a medical reason to do it sooner. Know what to expect after delivery, and plan for the first few weeks at home  · You, your baby, and your partner or  will get identification bands. Only people with matching bands can  the baby from the nursery. · You will learn how to feed, diaper, and bathe your baby. And you will learn how to care for the umbilical cord stump. If your baby will be circumcised, you will also learn how to care for that. · Ask people to wait to visit you until you are at home. And ask them to wash their hands before they touch your baby. · Make sure you have another adult in your home for at least 2 or 3 days after the birth. · During the first 2 weeks, limit when friends and family can visit. · Do not allow visitors who have colds or infections. Make sure all visitors are up to date with their vaccinations. Never let anyone smoke around your baby. · Try to nap when the baby naps. Be aware of postpartum depression  · \"Baby blues\" are common for the first 1 to 2 weeks after birth. You may cry or feel sad or irritable for no reason. · For some women, these feelings last longer and are more intense. This is called postpartum depression. · If your symptoms last for more than a few weeks or you feel very depressed, ask your doctor for help. · Postpartum depression can be treated. Support groups and counseling can help. Sometimes medicine can also help. Where can you learn more?   Go to http://ama-alyson.info/. Enter B044 in the search box to learn more about \"Week 38 of Your Pregnancy: Care Instructions. \"  Current as of: March 16, 2017  Content Version: 11.4  © 3651-3889 [employer]. Care instructions adapted under license by [employer] (which disclaims liability or warranty for this information). If you have questions about a medical condition or this instruction, always ask your healthcare professional. [address] any warranty or liability for your use of this information.

## 2018-04-10 NOTE — PROGRESS NOTES
Problem List  Date Reviewed: 4/5/2018          Codes Class Noted    Encounter for supervision of other normal pregnancy, first trimester ICD-10-CM: Z34.81  ICD-9-CM: V22.1  9/19/2017    Overview Addendum 4/9/2018  3:58 PM by Froilan Reinoso     Intrauterine pregnancy with the following problems identified:   EDC 4/21/2018 by [address] [place] [address] to [place] 2 weeks prior to conception - pt and partner no sx- advised to use condoms  SMA--low risk  Flu vaccine 10/12/17  9/26/17 NIPTS normal  Anemic 10.6 @ 27 wks  GBS- positive - PCN allergic, sensitive to clindamycin  Induction 4/16/18. Waitlist 4/17. Patient aware.              Environmental allergies ICD-10-CM: Z91.09  ICD-9-CM: V15.09  3/23/2012

## 2018-04-10 NOTE — PROGRESS NOTES
Complains of trickling - changing mini pads.  Some back cramping    White discharge in vagina  nitrazine neg  Cervix unchanged 50/4  Labor precautions

## 2018-04-13 ENCOUNTER — HOSPITAL ENCOUNTER (INPATIENT)
Age: 28
LOS: 2 days | Discharge: HOME | DRG: 560 | End: 2018-04-15
Attending: OBSTETRICS & GYNECOLOGY | Admitting: OBSTETRICS & GYNECOLOGY
Payer: MEDICAID

## 2018-04-13 PROBLEM — Z34.90 PREGNANCY: Status: ACTIVE | Noted: 2018-04-13

## 2018-04-13 LAB
BASOPHILS # BLD: 0 K/UL (ref 0–0.1)
BASOPHILS NFR BLD: 0 % (ref 0–1)
DIFFERENTIAL METHOD BLD: ABNORMAL
EOSINOPHIL # BLD: 0.1 K/UL (ref 0–0.4)
EOSINOPHIL NFR BLD: 1 % (ref 0–7)
ERYTHROCYTE [DISTWIDTH] IN BLOOD BY AUTOMATED COUNT: 14.4 % (ref 11.5–14.5)
HCT VFR BLD AUTO: 34.6 % (ref 35–47)
HGB BLD-MCNC: 11.5 G/DL (ref 11.5–16)
IMM GRANULOCYTES # BLD: 0.1 K/UL (ref 0–0.04)
IMM GRANULOCYTES NFR BLD AUTO: 1 % (ref 0–0.5)
LYMPHOCYTES # BLD: 1.4 K/UL (ref 0.8–3.5)
LYMPHOCYTES NFR BLD: 14 % (ref 12–49)
MCH RBC QN AUTO: 31.7 PG (ref 26–34)
MCHC RBC AUTO-ENTMCNC: 33.2 G/DL (ref 30–36.5)
MCV RBC AUTO: 95.3 FL (ref 80–99)
MONOCYTES # BLD: 0.8 K/UL (ref 0–1)
MONOCYTES NFR BLD: 8 % (ref 5–13)
NEUTS SEG # BLD: 7.8 K/UL (ref 1.8–8)
NEUTS SEG NFR BLD: 75 % (ref 32–75)
NRBC # BLD: 0 K/UL (ref 0–0.01)
NRBC BLD-RTO: 0 PER 100 WBC
PLATELET # BLD AUTO: 199 K/UL (ref 150–400)
PMV BLD AUTO: 10.7 FL (ref 8.9–12.9)
RBC # BLD AUTO: 3.63 M/UL (ref 3.8–5.2)
WBC # BLD AUTO: 10.3 K/UL (ref 3.6–11)

## 2018-04-13 PROCEDURE — 10907ZC DRAINAGE OF AMNIOTIC FLUID, THERAPEUTIC FROM PRODUCTS OF CONCEPTION, VIA NATURAL OR ARTIFICIAL OPENING: ICD-10-PCS | Performed by: OBSTETRICS & GYNECOLOGY

## 2018-04-13 PROCEDURE — 65270000029 HC RM PRIVATE

## 2018-04-13 PROCEDURE — 74011250637 HC RX REV CODE- 250/637: Performed by: OBSTETRICS & GYNECOLOGY

## 2018-04-13 PROCEDURE — 75410000003 HC RECOV DEL/VAG/CSECN EA 0.5 HR: Performed by: OBSTETRICS & GYNECOLOGY

## 2018-04-13 PROCEDURE — 85025 COMPLETE CBC W/AUTO DIFF WBC: CPT | Performed by: OBSTETRICS & GYNECOLOGY

## 2018-04-13 PROCEDURE — 75410000002 HC LABOR FEE PER 1 HR: Performed by: OBSTETRICS & GYNECOLOGY

## 2018-04-13 PROCEDURE — 74011250636 HC RX REV CODE- 250/636: Performed by: OBSTETRICS & GYNECOLOGY

## 2018-04-13 PROCEDURE — 36415 COLL VENOUS BLD VENIPUNCTURE: CPT | Performed by: OBSTETRICS & GYNECOLOGY

## 2018-04-13 PROCEDURE — 75410000000 HC DELIVERY VAGINAL/SINGLE: Performed by: OBSTETRICS & GYNECOLOGY

## 2018-04-13 PROCEDURE — 3E033VJ INTRODUCTION OF OTHER HORMONE INTO PERIPHERAL VEIN, PERCUTANEOUS APPROACH: ICD-10-PCS | Performed by: OBSTETRICS & GYNECOLOGY

## 2018-04-13 RX ORDER — LIDOCAINE HYDROCHLORIDE 10 MG/ML
20 INJECTION INFILTRATION; PERINEURAL ONCE
Status: DISCONTINUED | OUTPATIENT
Start: 2018-04-13 | End: 2018-04-13

## 2018-04-13 RX ORDER — HYDROCORTISONE ACETATE PRAMOXINE HCL 2.5; 1 G/100G; G/100G
CREAM TOPICAL AS NEEDED
Status: DISCONTINUED | OUTPATIENT
Start: 2018-04-13 | End: 2018-04-15 | Stop reason: HOSPADM

## 2018-04-13 RX ORDER — HYDROCODONE BITARTRATE AND ACETAMINOPHEN 5; 325 MG/1; MG/1
1 TABLET ORAL
Status: DISCONTINUED | OUTPATIENT
Start: 2018-04-13 | End: 2018-04-15 | Stop reason: HOSPADM

## 2018-04-13 RX ORDER — CALCIUM CARBONATE 200(500)MG
400 TABLET,CHEWABLE ORAL
Status: DISCONTINUED | OUTPATIENT
Start: 2018-04-13 | End: 2018-04-13

## 2018-04-13 RX ORDER — SWAB
1 SWAB, NON-MEDICATED MISCELLANEOUS DAILY
Status: DISCONTINUED | OUTPATIENT
Start: 2018-04-14 | End: 2018-04-15 | Stop reason: HOSPADM

## 2018-04-13 RX ORDER — ONDANSETRON 4 MG/1
4 TABLET, ORALLY DISINTEGRATING ORAL
Status: DISCONTINUED | OUTPATIENT
Start: 2018-04-13 | End: 2018-04-15 | Stop reason: HOSPADM

## 2018-04-13 RX ORDER — SODIUM CHLORIDE, SODIUM LACTATE, POTASSIUM CHLORIDE, CALCIUM CHLORIDE 600; 310; 30; 20 MG/100ML; MG/100ML; MG/100ML; MG/100ML
125 INJECTION, SOLUTION INTRAVENOUS CONTINUOUS
Status: DISCONTINUED | OUTPATIENT
Start: 2018-04-13 | End: 2018-04-13

## 2018-04-13 RX ORDER — NALOXONE HYDROCHLORIDE 0.4 MG/ML
0.4 INJECTION, SOLUTION INTRAMUSCULAR; INTRAVENOUS; SUBCUTANEOUS AS NEEDED
Status: DISCONTINUED | OUTPATIENT
Start: 2018-04-13 | End: 2018-04-13 | Stop reason: SDUPTHER

## 2018-04-13 RX ORDER — SODIUM CHLORIDE 0.9 % (FLUSH) 0.9 %
5-10 SYRINGE (ML) INJECTION EVERY 8 HOURS
Status: DISCONTINUED | OUTPATIENT
Start: 2018-04-13 | End: 2018-04-14

## 2018-04-13 RX ORDER — DIPHENHYDRAMINE HCL 25 MG
25 CAPSULE ORAL
Status: DISCONTINUED | OUTPATIENT
Start: 2018-04-13 | End: 2018-04-15 | Stop reason: HOSPADM

## 2018-04-13 RX ORDER — NALOXONE HYDROCHLORIDE 0.4 MG/ML
0.4 INJECTION, SOLUTION INTRAMUSCULAR; INTRAVENOUS; SUBCUTANEOUS AS NEEDED
Status: DISCONTINUED | OUTPATIENT
Start: 2018-04-13 | End: 2018-04-13

## 2018-04-13 RX ORDER — MAG HYDROX/ALUMINUM HYD/SIMETH 200-200-20
30 SUSPENSION, ORAL (FINAL DOSE FORM) ORAL
Status: DISCONTINUED | OUTPATIENT
Start: 2018-04-13 | End: 2018-04-13

## 2018-04-13 RX ORDER — IBUPROFEN 800 MG/1
800 TABLET ORAL EVERY 8 HOURS
Status: DISCONTINUED | OUTPATIENT
Start: 2018-04-13 | End: 2018-04-15 | Stop reason: HOSPADM

## 2018-04-13 RX ORDER — OXYTOCIN IN 5 % DEXTROSE 30/500 ML
1-25 PLASTIC BAG, INJECTION (ML) INTRAVENOUS
Status: DISCONTINUED | OUTPATIENT
Start: 2018-04-13 | End: 2018-04-13

## 2018-04-13 RX ORDER — LIDOCAINE HYDROCHLORIDE 10 MG/ML
20 INJECTION, SOLUTION EPIDURAL; INFILTRATION; INTRACAUDAL; PERINEURAL ONCE
Status: DISCONTINUED | OUTPATIENT
Start: 2018-04-13 | End: 2018-04-13

## 2018-04-13 RX ORDER — CLINDAMYCIN PHOSPHATE 900 MG/50ML
900 INJECTION, SOLUTION INTRAVENOUS EVERY 8 HOURS
Status: DISCONTINUED | OUTPATIENT
Start: 2018-04-13 | End: 2018-04-13

## 2018-04-13 RX ORDER — SODIUM CHLORIDE 0.9 % (FLUSH) 0.9 %
5-10 SYRINGE (ML) INJECTION AS NEEDED
Status: DISCONTINUED | OUTPATIENT
Start: 2018-04-13 | End: 2018-04-15 | Stop reason: HOSPADM

## 2018-04-13 RX ORDER — HYDROCODONE BITARTRATE AND ACETAMINOPHEN 10; 325 MG/1; MG/1
1 TABLET ORAL
Status: DISCONTINUED | OUTPATIENT
Start: 2018-04-13 | End: 2018-04-15 | Stop reason: HOSPADM

## 2018-04-13 RX ORDER — ONDANSETRON 2 MG/ML
4 INJECTION INTRAMUSCULAR; INTRAVENOUS
Status: DISCONTINUED | OUTPATIENT
Start: 2018-04-13 | End: 2018-04-13

## 2018-04-13 RX ORDER — ZOLPIDEM TARTRATE 5 MG/1
5 TABLET ORAL
Status: DISCONTINUED | OUTPATIENT
Start: 2018-04-13 | End: 2018-04-15 | Stop reason: HOSPADM

## 2018-04-13 RX ORDER — OXYTOCIN/RINGER'S LACTATE 20/1000 ML
125-500 PLASTIC BAG, INJECTION (ML) INTRAVENOUS ONCE
Status: ACTIVE | OUTPATIENT
Start: 2018-04-13 | End: 2018-04-14

## 2018-04-13 RX ORDER — DOCUSATE SODIUM 100 MG/1
100 CAPSULE, LIQUID FILLED ORAL 2 TIMES DAILY
Status: DISCONTINUED | OUTPATIENT
Start: 2018-04-13 | End: 2018-04-15 | Stop reason: HOSPADM

## 2018-04-13 RX ORDER — SIMETHICONE 80 MG
80 TABLET,CHEWABLE ORAL
Status: DISCONTINUED | OUTPATIENT
Start: 2018-04-13 | End: 2018-04-15 | Stop reason: HOSPADM

## 2018-04-13 RX ADMIN — Medication 4 MILLI-UNITS/MIN: at 07:50

## 2018-04-13 RX ADMIN — CLINDAMYCIN PHOSPHATE 900 MG: 900 INJECTION, SOLUTION INTRAVENOUS at 07:02

## 2018-04-13 RX ADMIN — IBUPROFEN 800 MG: 800 TABLET ORAL at 14:02

## 2018-04-13 RX ADMIN — IBUPROFEN 800 MG: 800 TABLET ORAL at 22:01

## 2018-04-13 RX ADMIN — Medication 2 MILLI-UNITS/MIN: at 07:30

## 2018-04-13 NOTE — LACTATION NOTE
This note was copied from a baby's chart. Discussed with mother her plan for feeding. Reviewed the benefits of exclusive breast milk feeding during the hospital stay. Informed her of the risks of using formula to supplement in the first few days of life as well as the benefits of successful breast milk feeding; referred her to the Breastfeeding booklet about this information. She acknowledges understanding of information reviewed and states that it is her plan to breastfeed her infant. Will support her choice and offer additional information as needed. Reviewed breastfeeding basics:  How milk is made and normal  breastfeeding behaviors discussed. Supply and demand,  stomach size, early feeding cues, skin to skin bonding with comfortable positioning and baby led latch-on reviewed. How to identify signs of successful breastfeeding sessions reviewed; education on assymetrical latch, signs of effective latching vs shallow, in-effective latching, normal  feeding frequency and duration and expected infant output discussed. Normal course of breastfeeding discussed including the AAP's recommendation that children receive exclusive breast milk feedings for the first six months of life with breast milk feedings to continue through the first year of life and/or beyond as complimentary table foods are added. Breastfeeding Booklet and Warm line information provided with discussion. Discussed typical  weight loss and the importance of pediatrician appointment within 24-48 hours of discharge, at 2 weeks of life and normalcy of requesting pediatric weight checks as needed in between visits. Biological Nurturing breastfeeding principles taught. How Biological Nurturing (BN)  promotes optimal breastfeeding (BF) sessions discussed. Mother encouraged to seek comfortable semi-reclining breastfeeding positions. Infant placed frontally along maternal contour.   Primitive innate feeding reflexes/behaviors of the  discussed. BN tips and techniques shared; assisted with comfortable breastfeeding positioning. Pt will successfully establish breastfeeding by feeding in response to early feeding cues   or wake every 3h, will obtain deep latch, and will keep log of feedings/output. Taught to BF at hunger cues and or q 2-3 hrs and to offer 10-20 drops of hand expressed colostrum at any non-feeds. Breast Assessment  Left Breast: Medium, Large  Left Nipple: Flat  Right Breast: Medium, Large  Right Nipple: Everted  Breast- Feeding Assessment  Attends Breast-Feeding Classes: No  Breast-Feeding Experience: Yes (2 months with first)  Breast Trauma/Surgery: No  Type/Quality: Good  Lactation Consultant Visits  Breast-Feedings: Good   Mother/Infant Observation  Mother Observation: Alignment, Breast comfortable, Close hold  Infant Observation: Lips flanged, lower, Lips flanged, upper, Opens mouth  LATCH Documentation  Latch: Grasps breast, tongue down, lips flanged, rhythmic sucking  Audible Swallowing: A few with stimulation  Type of Nipple: Flat  Comfort (Breast/Nipple): Soft/non-tender  Hold (Positioning): Full assist, teach one side, mother does other, staff holds  LATCH Score: 7  Baby latches to right breast no issue, left nipple more flat, baby latched several times on flat nipple but would not sustain latch, encouraged mother to express drops on that side each feeding if she cant get baby to latch, will do shield if baby will not latch to left side.

## 2018-04-13 NOTE — H&P
History & Physical    Name: Craig Poole MRN: [id number]  SSN: [id number]    YOB: 1990  Age: 32 y.o. Sex: female        Subjective:     Estimated Date of Delivery: 18  OB History      Para Term  AB Living    4 2 2  1 2    SAB TAB Ectopic Molar Multiple Live Births    1    0 2          MsStefani Cooley is admitted with pregnancy at 38w6d for induction of labor. Prenatal course was complicated by vaginal bleeding. Please see prenatal records for details. Past Medical History:   Diagnosis Date    Chlamydia 2014    ESCOBAR II (cervical intraepithelial neoplasia II)     Dysplasia of cervix, low grade (ESCOBAR 1)      Past Surgical History:   Procedure Laterality Date    HX ACL RECONSTRUCTION  2008    HX COLPOSCOPY      4/28/10- ecto ESCOBAR 2----12 ESCOBAR 1---13-negative    HX LAP CHOLECYSTECTOMY  10/04/2016    Laparoscopic cholecystectomy by Dr. Gaby Saez.  HX ORTHOPAEDIC      left ACL surgery    HX OTHER SURGICAL      HX TONSILLECTOMY       Social History     Occupational History    Not on file. Social History Main Topics    Smoking status: Former Smoker    Smokeless tobacco: Never Used    Alcohol use Yes    Drug use: No    Sexual activity: Yes     Partners: Male     Birth control/ protection: None     Family History   Problem Relation Age of Onset    No Known Problems Mother     No Known Problems Father     Diabetes Maternal Uncle     Hypertension Maternal Grandmother        Allergies   Allergen Reactions    Depo-Provera [Medroxyprogesterone] Hives    Reclipsen (29) [Desogestrel-Ethinyl Estradiol] Hives    Sulfa (Sulfonamide Antibiotics) Hives     As child    Pcn [Penicillins] Hives     Prior to Admission medications    Not on File        Review of Systems: A comprehensive review of systems was negative except for that written in the HPI.     Objective:     Vitals:  Vitals:    18 0706   BP: 133/84   Pulse: (!) 103   Resp: 17   Temp: 98.3 °F (36.8 °C) Physical Exam:  Heart: Regular rate and rhythm  Lung: clear to auscultation throughout lung fields, no wheezes, no rales, no rhonchi and normal respiratory effort  Abdomen: soft, nontender  Fundus: soft and non tender  Perineum: blood absent, amniotic fluid absent  Cervical Exam: 6/50 %/-3/   Membranes:  Artificial Rupture of Membranes; Amniotic Fluid: small amount of clear fluid  Fetal Heart Rate: Reactive    Prenatal Labs:   Lab Results   Component Value Date/Time    Rubella, External immune 09/11/2017    GrBStrep, External Positive 03/29/2018    HBsAg, External neg 09/11/2017    HIV, External neg 09/11/2017    Gonorrhea, External neg 11/26/2014    Chlamydia, External neg 11/26/2014        Assessment/Plan:     Plan: Admit for Reassuring fetal status, Continue plan for vaginal delivery. Group B Strep was positive, will treat prophylactically with clindamycin.     Signed By:  Matilda Sullivan MD     April 13, 2018

## 2018-04-13 NOTE — L&D DELIVERY NOTE
Delivery Summary    Patient: Ave Doran MRN: [id number]  SSN: [id number]    YOB: 1990  Age: 32 y.o. Sex: female        Labor Events:    Labor: No    Rupture Date: 2018    Rupture Time: 8:15 AM    Rupture Type AROM    Amniotic Fluid Volume:      Amniotic Fluid Description:    None    Induction: Oxytocin        Augmentation: None    Labor Complications: None     Additional Complications:        Cervical Ripening:       None      Delivery Events:  Episiotomy: None    Laceration(s): None      Repaired: None     Number of Repair Packets:      Suture Type and Size:         Estimated Blood Loss (ml): 200        Information for the patient's : Judy De Souza Female [[id number]]     Delivery Summary - Baby    Delivery Date: 2018   Delivery Time: 12:03 PM   Delivery Type: Vaginal, Spontaneous Delivery  Sex:  female  Gestational Age: 38w7d  Delivery Clinician:  Daryl Mccauley  Living?: Living   Delivery Location: [address]           APGARS  One minute Five minutes Ten minutes   Skin Color: 1    1       Heart Rate: 2   2         Reflex Irritability: 2   2         Muscle Tone: 2   2       Respiration: 2   2         Total: 9   9           Presentation: Vertex  Position:   Occiput Anterior  Resuscitation Method:  Suctioning-bulb; Tactile Stimulation     Meconium Stained: None    Cord Information: 3 Vessels   Complications: None  Cord Blood Sent?:  Yes    Blood Gases Sent?:  No    Placenta:  Date/Time:  12:06 PM  Removal: Spontaneous      Appearance: Normal      Measurements:  Birth Weight:      Birth Length:     Head Circumference:       Chest Circumference:      Abdominal Girth: Other Providers:   CANDELARIA PHAM;MONIQUE MEHTA;GATO ROPER ISABELLA G Obstetrician;Primary Nurse;Primary [place] Nurse;Tech           Cord Blood Results:  Information for the patient's :   Judy De Souza, Female [[id number]]   No results found for: ABORH, PCTABR, PCTDIG, BILI, ABORHEXT, [address]    Information for the patient's : Tamia Ramirez, Female [[id number]]   No results found for: APH, APCO2, APO2, AHCO3, ABEC, ABDC, O2ST, SITE, RSCOM, PHI, PCO2I, PO2I, HCO3I, SO2I, IBD     Information for the patient's :   Tamia Ramirez, Female [[id number]]   No results found for: EPHV, PCO2V, PO2V, HCO3V, O2STV, EBDV

## 2018-04-13 NOTE — IP AVS SNAPSHOT
[address] 
[phone] Patient: Joaquin Dinh MRN: [id number] :1990 A check sim indicates which time of day the medication should be taken. My Medications ASK your doctor about these medications Instructions Each Dose to Equal  
 Morning Noon Evening Bedtime PRENATAL DHA+COMPLETE PRENATAL -300 mg-mcg-mg Cmpk Generic drug:  [id number] grant-folic-dha Your last dose was: Your next dose is: Take  by mouth.

## 2018-04-13 NOTE — PROGRESS NOTES
0650-Bedside report received from [id number] Joey Falcon RN. POC discussed with pt at this time. Pt states she would like to go natural with this delivery. Writer explained to pt all her options as far as pain goes, and pt instructed that if she changes her mind to just let writer know. All pt's questions answered and pt verbalized understanding and has no further questions or concerns at this time. 0715-Francisco Adkins called writer left message regarding starting pitocin on pt, waiting for MD to call unit to confirm. 0725-Francisco Adkins called, pitocin to be started. 0812-Francisco Valdest at bedside to see pt, discuss POC, and perform SVE.  0835-Pt up ambulating in [place] with  by her side for support. 0948-Pt in room on birthing ball at bedside. 1031-Francisco Valdest at bedside to see pt and perform SVE. 1110-Writer at bedside, pt sitting straight up in bed getting ready to get on birthing ball at the bedside. Writer will adjust monitors once pt is on birthing ball. 1117-Pt off birthing ball and in bathroom. Writer still at bedside to adjust monitor once pt is out of bathroom. Maternal heart rate tracing. 1126-Pt out of bathroom and monitors adjusted. Pt back sitting on birthing ball at the bedside. Pt's  by her side for support. 1412-IV SL  1415-Pt assisted to the bathroom. Pt voided 700cc of urine, noah care performed, pads changed, and pt assisted back to bed. Pt states she needs nothing else at this time. 1500-Writer at bedside to check on pt. Pt states she has no pain after taking the motrin, and that she needs nothing else at this time. 1630-Writer to bedside, pt sitting in bed holding infant. Pt states she needs nothing at this time. 1740-Pt sitting up in bed attempting to get infant latched, pt states she needs nothing at this time. 1905-Bedside report given to ONI Guerrero RN.

## 2018-04-13 NOTE — IP AVS SNAPSHOT
Maia Tolentino 
 
 
 [address] [facility] 
[phone] Patient: Craig Poole MRN: [id number] :1990 About your hospitalization You were admitted on:  2018 You [facility] the:  [facility] [address] You were discharged on:  April 15, 2018 Why you were hospitalized Your primary diagnosis was:  Not on File Your diagnoses also included:  Pregnancy Follow-up Information Follow up With Details Comments Contact Info Shantelle Tran MD   [phone] [facility] 
[phone] Discharge Orders None A check sim indicates which time of day the medication should be taken. My Medications ASK your doctor about these medications Instructions Each Dose to Equal  
 Morning Noon Evening Bedtime PRENATAL DHA+COMPLETE PRENATAL  mg-mcg-mg Cmpk Generic drug:  [id number] grant-folic-dha Your last dose was: Your next dose is: Take  by mouth. Discharge Instructions POST DELIVERY DISCHARGE INSTRUCTIONS Name: Craig Poole YOB: 1990 Primary Diagnosis: Active Problems: 
  Pregnancy (2018) POSTPARTUM DISCHARGE INSTRUCTIONS Name:  Craig Poole YOB: 1990 Admission Diagnosis:  Pregnancy Discharge Diagnosis:   
Problem List as of 4/15/2018  Date Reviewed: 2018 Codes Class Noted - Resolved Pregnancy ICD-10-CM: Z34.90 ICD-9-CM: V22.2  2018 - Present Encounter for supervision of other normal pregnancy, first trimester ICD-10-CM: Z34.81 ICD-9-CM: V22.1  2017 - Present Overview Addendum 2018  3:58 PM by Noah Fleming Intrauterine pregnancy with the following problems identified: [facility] 2018 by [address] Traveled to [place] 2 weeks prior to conception - pt and partner no sx- advised to use condoms SMA--low risk Flu vaccine 10/12/17 
9/26/17 NIPTS normal 
Anemic 10.6 @ 27 wks GBS- positive - PCN allergic, sensitive to clindamycin Induction 4/16/18. Waitlist 4/17. Patient aware. Environmental allergies ICD-10-CM: Z91.09 
ICD-9-CM: V15.09  3/23/2012 - Present RESOLVED: Active labor at term ICD-10-CM: Josh Villarreal ICD-9-CM: Pedroberta Villarreal  7/3/2015 - 11/2/2016 RESOLVED: Supervision of other normal pregnancy ICD-10-CM: Z34.80 ICD-9-CM: V22.1  12/8/2014 - 9/19/2017 Overview Addendum 4/14/2015 11:02 AM by Simona Hassan MD  
  [facility] 7/7/2015 by [address] today Hx of ESCOBAR I/II - no followup; 2014 neg pap neg HPV Flu vaccine received NT and MSAFP wnl Chlamydia pos 4/15 - retest end of June 2015 prior to delivery RESOLVED: Insect bite ICD-10-CM: W57. Callie Zapata ICD-9-CM: 919.4, E906.4  2/5/2014 - 11/2/2016 RESOLVED: Skin lesion ICD-10-CM: L98.9 ICD-9-CM: 709.9  2/5/2014 - 11/2/2016 RESOLVED: Acute low back pain ICD-10-CM: M54.5 ICD-9-CM: 724.2  3/29/2012 - 11/2/2016 Attending Physician:  Simona Hassan MD 
 
Delivery Type:  Vaginal Childbirth: What To Expect At Home Your Recovery: Your body will slowly heal in the next few weeks. It is easy to get too tired and overwhelmed during the first weeks after your baby is born. Changes in your hormones can shift your mood without warning. You may find it hard to meet the extra demands on your energy and time. Take it easy on yourself. Follow-up care is a key part of your treatment and safety. Be sure to make and go to all appointments, and call your doctor if you are having problems. It's also a good idea to know your test results and keep a list of the medicines you take. How can you care for yourself at home? Vaginal bleeding and cramps · After delivery, you will have a bloody discharge from the vagina. This will turn pink within a week and then white or yellow after about 10 days. It may last for 2 to 4 weeks or longer, until the uterus has healed. Use pads instead of tampons until you stop bleeding. · Do not worry if you pass some blood clots, as long as they are smaller than a golf ball. If you have a tear or stitches in your vaginal area, change the pad at least every 4 hours to prevent soreness and infection. · You may have cramps for the first few days after childbirth. These are normal and occur as the uterus shrinks to normal size. Take an over-the-counter pain medicine, such as acetaminophen (Tylenol), ibuprofen (Advil, Motrin), or naproxen (Aleve), for cramps. Read and follow all instructions on the label. Do not take aspirin, because it can cause more bleeding. Do not take acetaminophen (Tylenol) and other acetaminophen containing medications (i.e. Percocet) at the same time. Breast fullness · Your breasts may overfill (engorge) in the first few days after delivery. To help milk flow and to relieve pain, warm your breasts in the shower or by using warm, moist towels before nursing. · If you are not nursing, do not put warmth on your breasts or touch your breasts. Wear a tight bra or sports bra and use ice until the fullness goes away. This usually takes 2 to 3 days. · Put ice or a cold pack on your breast after nursing to reduce swelling and pain. Put a thin cloth between the ice and your skin. Activity · Eat a balanced diet. Do not try to lose weight by cutting calories. Keep taking your prenatal vitamins, or take a multivitamin. · Get as much rest as you can. Try to take naps when your baby sleeps during the day. · Get some exercise every day. But do not do any heavy exercise until your doctor says it is okay. · Wait until you are healed (about 4 to 6 weeks) before you have sexual intercourse. Your doctor will tell you when it is okay to have sex. · Talk to your doctor about birth control.  You can get pregnant even before your period returns. Also, you can get pregnant while you are breast-feeding. Mental Health · Many women get the \"baby blues\" during the first few days after childbirth. You may lose sleep, feel irritable, and cry easily. You may feel happy one minute and sad the next. Hormone changes are one cause of these emotional changes. Also, the demands of a new baby, along with visits from relatives or other family needs, add to a mother's stress. The \"baby blues\" often peak around the fourth day. Then they ease up in less than 2 weeks. · If your moodiness or anxiety lasts for more than 2 weeks, or if you feel like life is not worth living, you may have postpartum depression. This is different for each mother. Some mothers with serious depression may worry intensely about their infant's well-being. Others may feel distant from their child. Some mothers might even feel that they might harm their baby. A mother may have signs of paranoia, wondering if someone is watching her. · With all the changes in your life, you may not know if you are depressed. Pregnancy sometimes causes changes in how you feel that are similar to the symptoms of depression. · Symptoms of depression include: · Feeling sad or hopeless and losing interest in daily activities. These are the most common symptoms of depression. · Sleeping too much or not enough. · Feeling tired. You may feel as if you have no energy. · Eating too much or too little. · [employer] (PSI) offers a Warm line; Chat with the Expert phone sessions; Information and Articles about Pregnancy and Postpartum Mood Disorders; Comprehensive List of Free Support Groups; Knowledgeable local coordinators who will offer support, information, and resources; Guide to Resources on WorkVoices; Calendar of events in the  mood disorders community;  Latest News and Research; and [facility] Section for Access and Networking. Remember - You are not alone; You are not to blame; With help, you will be well. 3-477-236-PPD(6045). WWW. POSTPARTUM. NET · Writing or talking about death, such as writing suicide notes or talking about guns, knives, or pills. Keep the numbers for these national suicide hotlines: 7-792-388-TALK (9-762.479.9881) and 7-862-RRJDDQA (7-908.980.8744). If you or someone you know talks about suicide or feeling hopeless, get help right away. Constipation and Hemorrhoids · Drink plenty of fluids, enough so that your urine is light yellow or clear like water. If you have kidney, heart, or liver disease and have to limit fluids, talk with your doctor before you increase the amount of fluids you drink. · Eat plenty of fiber each day. Have a bran muffin or bran cereal for breakfast, and try eating a piece of fruit for a mid-afternoon snack. · For painful, itchy hemorrhoids, put ice or a cold pack on the area several times a day for 10 minutes at a time. Follow this by putting a warm compress on the area for another 10 to 20 minutes or by sitting in a shallow, warm bath. When should you call for help? Call 911 anytime you think you may need emergency care. For example, call if: 
· You are thinking of hurting yourself, your baby, or anyone else. · You passed out (lost consciousness). · You have symptoms of a blood clot in your lung (called a pulmonary embolism). These may include:   
· Sudden chest pain. · Trouble breathing. · Coughing up blood. Call your doctor now or seek immediate medical care if: 
· You have severe vaginal bleeding. · You are soaking through a pad each hour for 2 or more hours. · Your vaginal bleeding seems to be getting heavier or is still bright red 4 days after delivery. · You are dizzy or lightheaded, or you feel like you may faint. · You are vomiting or cannot keep fluids down. · You have a fever. · You have new or more belly pain. · You pass tissue (not just blood). · Your vaginal discharge smells bad. · Your belly feels tender or full and hard. · Your breasts are continuously painful or red. · You feel sad, anxious, or hopeless for more than a few days. · You have sudden, severe pain in your belly. · You have symptoms of a blood clot in your leg (called a deep vein thrombosis),  
       such as: 
· Pain in your calf, back of the knee, thigh, or groin. · Redness and swelling in your leg or groin. · You have symptoms of preeclampsia, such as: 
· Sudden swelling of your face, hands, or feet. · New vision problems (such as dimness or blurring). · A severe headache. · Your blood pressure is higher than it should be or rises suddenly. · You have new nausea or vomiting. Watch closely for changes in your health, and be sure to contact your doctor if you have any problems. Additional Information:  Postpartum Support PARENTS:  Are you feeling sad or depressed? Is it difficult for you to enjoy yourself? Do you feel more irritable or tense? Do you feel anxious or panicky? Are you having difficulty bonding with your baby? Do you feel as if you are \"out of control\" or \"going crazy\"? Are you worried that you might hurt your baby or yourself? FAMILIES: Do you worry that something is wrong but don't know how to help? Do you think that your partner or spouse is having problems coping? Are you worried that it may never get better? While many women experience some mild mood change or \"the blues\" during or after the birth of a child, 1 in 9 women experience more significant symptoms of depression or anxiety. 1 in 10 Dads become depressed during the first year. Things you can do Being a good parent includes taking care of yourself. If you take care of yourself, you will be able to take better care of your baby and your family.   
· Talk to a counselor or healthcare provider who has training in  mood and anxiety problems. · Learn as much as you can about pregnancy and postpartum depression and anxiety. · Get support from family and friends. Ask for help when you need it. · Join a support group in your area or online. · Keep active by walking, stretching or whatever form of exercise helps you to feel better. · Get enough rest and time for yourself. · Eat a healthy diet. · Don't give up! It may take more than one try to get the right help you need. These are general instructions for a healthy lifestyle: No smoking/ No tobacco products/ Avoid exposure to second hand smoke Surgeon General's Warning:  Quitting smoking now greatly reduces serious risk to your health. Obesity, smoking, and sedentary lifestyle greatly increases your risk for illness A healthy diet, regular physical exercise & weight monitoring are important for maintaining a healthy lifestyle Recognize signs and symptoms of STROKE: 
 
F-face looks uneven A-arms unable to move or move unevenly S-speech slurred or non-existent T-time-call 911 as soon as signs and symptoms begin - DO NOT go  
    back to bed or wait to see if you get better - TIME IS BRAIN. I have had the opportunity to make my options or choices for discharge. I have received and understand these instructions. General:  
 
Diet/Diet Restrictions: 
Eight 8-ounce glasses of fluid daily (water, juices); avoid excessive caffeine intake. Meals/snacks as desired which are high in fiber and carbohydrates and low in fat and cholesterol. Physical Activity / Restrictions / Safety:  
 
Avoid heavy lifting, no more that 8 lbs. For 2-3 weeks; No driving while taking narcotic pain medication. Post  patients should not drive until pain free. No intercourse 4-6 weeks, no douching or tampon use. May resume exercise in 6 weeks. Discharge Instructions/Special Treatment/Home Care Needs:  
 
Continue prenatal vitamins. Continue to use squirt bottle with warm water on your episiotomy after each bathroom use until bleeding stops. If steri-strips applied to your incision, remove in 7 days. Take stool softeners daily. Call your doctor for the following:  
 
Fever over 101 degrees by mouth. Vaginal bleeding heavier than a normal menstrual period or lost larger than a golf ball. Red streaks or increased swelling of legs, painful red streaks on your breast. 
Painful urination, or increased pain, redness or discharge with your incision. Pain Management:  
 
Pain Management:  
Take Acetaminophen (Tylenol) or Ibuprofen (Advil, Motrin), as directed for pain. Use a warm Sitz bath 3 times daily to relieve episiotomy or hemorrhoidal discomfort. Heating pad to  incision as needed. For hemorrhoidal discomfort, use Tucks and Anusol cream as needed and directed. Follow-Up Care:  
 
Appointment with MD: Follow-up Appointments Procedures  FOLLOW UP VISIT Appointment in: 6 Weeks Standing Status:   Standing Number of Occurrences:   1 Order Specific Question:   Appointment in Answer:   6 Weeks Telephone number: [phone] Signed By: Stephany Dozier MD                                                                                                   Date: 2018 Time: 1:20 PM 
 
 
  
  
  
[employer] Announcement We are excited to announce that we are making your provider's discharge notes available to you in [employer]. You will see these notes when they are completed and signed by the physician that discharged you from your recent hospital stay. If you have any questions or concerns about any information you see in [employer], please call the Health Information Department where you were seen or reach out to your Primary Care Provider for more information about your plan of care. Introducing [facility]! Dear Shashi Carter: 
Thank you for requesting a [employer] account.   Our records indicate that you already have an active [employer] account. You can access your account anytime at [email or web address] Did you know that you can access your hospital and ER discharge instructions at any time in [employer]? You can also review all of your test results from your hospital stay or ER visit. Additional Information If you have questions, please visit the Frequently Asked Questions section of the [employer] website at [email or web address] Remember, [employer] is NOT to be used for urgent needs. For medical emergencies, dial 911. Now available from your iPhone and Android! Introducing Chris Dias As a [employer] patient, I wanted to make you aware of our electronic visit tool called Chris Dias. [employer] 24/7 allows you to connect within minutes with a medical provider 24 hours a day, seven days a week via a mobile device or tablet or logging into a secure website from your computer. You can access Chris Dias from anywhere in the [place]. A virtual visit might be right for you when you have a simple condition and feel like you just dont want to get out of bed, or cant get away from work for an appointment, when your regular [employer] provider is not available (evenings, weekends or holidays), or when youre out of town and need minor care. Electronic visits cost only $49 and if the [employer] 24/7 provider determines a prescription is needed to treat your condition, one can be electronically transmitted to a nearby pharmacy*. Please take a moment to enroll today if you have not already done so. The enrollment process is free and takes just a few minutes. To enroll, please download the [employer] 24/7 lorenzo to your tablet or phone, or visit [email or web address] to enroll on your computer.    
And, as an [address] [place] [address] patient with a [employer] account, the results of your visits will be scanned into your electronic medical record and your primary care provider will be able to view the scanned results. We urge you to continue to see your regular [facility] provider for your ongoing medical care. And while your primary care provider may not be the one available when you seek a Chris Hernandezfin virtual visit, the peace of mind you get from getting a real diagnosis real time can be priceless. For more information on Chris [employer] websitesamanthafin, view our Frequently Asked Questions (FAQs) at [email or web address]. Sincerely, 
 
Sarah Ramos MD 
Chief Medical Officer [employer] *:  certain medications cannot be prescribed via [employer] Providers Seen During Your Hospitalization Provider Specialty Primary office phone Luisito Mitchell MD Obstetrics & Gynecology [phone] Your Primary Care Physician (PCP) Primary Care Physician Office Phone Office Fax Madeline Mentmore [phone] You are allergic to the following Allergen Reactions Depo-Provera (Medroxyprogesterone) Hives Reclipsen (28) (Desogestrel-Ethinyl Estradiol) Hives Sulfa (Sulfonamide Antibiotics) Hives As child Pcn (Penicillins) Hives Recent Documentation Breastfeeding? OB Status Smoking Status Unknown Recent pregnancy Former Smoker Emergency Contacts Name Discharge Info Relation Home Work Mobile [address] CAREGIVER [3] Father [15]   [phone] Clare Castaneda  Other Relative [6] [phone] Patient Belongings The following personal items are in your possession at time of discharge: 
  Dental Appliances: None         Home Medications: None   Jewelry: None  Clothing: At bedside    Other Valuables: None Please provide this summary of care documentation to your next provider. Signatures-by signing, you are acknowledging that this After Visit Summary has been reviewed with you and you have received a copy. Patient Signature:  ____________________________________________________________ Date:  ____________________________________________________________  
  
Bashir Livings Provider Signature:  ____________________________________________________________ Date:  ____________________________________________________________

## 2018-04-13 NOTE — DISCHARGE SUMMARY
Obstetrical Discharge Summary     Name: Antonia Aguilera MRN: [id number]  SSN: [id number]    YOB: 1990  Age: 32 y.o. Sex: female      Admit Date: 2018    Discharge Date: 4/15/2018     Admitting Physician: Walt Nichols MD     Attending Physician:  Walt Nichols MD     Admission Diagnoses: Pregnancy    Discharge Diagnoses:   Information for the patient's : Chava Barillas, Female [[id number]]   Delivery of a   female infant via Vaginal, Spontaneous Delivery on 2018 at 12:03 PM  by . Apgars were 9 and 9. Additional Diagnoses:   Hospital Problems  Date Reviewed: 4/10/2018          Codes Class Noted POA    Pregnancy ICD-10-CM: Z34.90  ICD-9-CM: V22.2  2018 Unknown             Lab Results   Component Value Date/Time    Rubella, External immune 2017    GrBStrep, External Positive 2018       Hospital Course: Normal hospital course following the delivery. Condition: good  Patient Instructions: There are no discharge medications for this patient. Reference my discharge instructions.     Follow-up Appointments   Procedures    FOLLOW UP VISIT Appointment in: 6 Weeks     Standing Status:   Standing     Number of Occurrences:   1     Order Specific Question:   Appointment in     Answer:   6 Weeks        Signed By:  Walt Nichols MD     2018

## 2018-04-14 PROCEDURE — 74011250637 HC RX REV CODE- 250/637: Performed by: OBSTETRICS & GYNECOLOGY

## 2018-04-14 PROCEDURE — 65270000029 HC RM PRIVATE

## 2018-04-14 RX ADMIN — Medication 1 TABLET: at 08:31

## 2018-04-14 RX ADMIN — DOCUSATE SODIUM 100 MG: 100 CAPSULE, LIQUID FILLED ORAL at 18:05

## 2018-04-14 RX ADMIN — IBUPROFEN 800 MG: 800 TABLET ORAL at 06:43

## 2018-04-14 RX ADMIN — IBUPROFEN 800 MG: 800 TABLET ORAL at 14:26

## 2018-04-14 RX ADMIN — IBUPROFEN 800 MG: 800 TABLET ORAL at 22:30

## 2018-04-14 RX ADMIN — DOCUSATE SODIUM 100 MG: 100 CAPSULE, LIQUID FILLED ORAL at 08:31

## 2018-04-14 NOTE — PROGRESS NOTES
CNMPostpartumNoteDay1  S: Patient ambulating and voiding without difficulty. Patient happy with birth experience. Breastfeeding well. No complaints.        O: Vital signs stable, Heart RRR without murmur, Lungs CTA bilaterally, FF below umbilicus, lochia rubra light or moderate, breasts soft, nipples intact, no edema, no signs of DVT    Visit Vitals    /69 (BP 1 Location: Right arm, BP Patient Position: At rest;Sitting)    Pulse 93    Temp 98 °F (36.7 °C)    Resp 14    Breastfeeding Unknown        A: Postpartum Day 1  Breastfeeding  Blood type/Rubella/GBS     P: Continue current postpartum orders  Lactation consult   Anticipate discharge tomorrow  Offer Tdap if has not had this pregnancy

## 2018-04-14 NOTE — LACTATION NOTE
This note was copied from a baby's chart. Mother states that after my visit yesterday baby was very sleepy though the night and she wasn't able to express drops so she is giving formula as well as breast feeding, discussed normal  behaviors, encouraged mother that if she wants to supplement after feeding that we can add pumping and then offer EBM as supplement if availiable, mother would like to add pumping. Guidelines for pumping, milk collection and storage, proper cleaning of pump parts all reviewed. Differences between hospital grade rental pumps vs store bought double electric/hand pumps discussed. Set up pumping with double electric set up. Assisted with pump session. List of area pump rental locations and lactation support services reviewed. Baby latched well during my visit, rhythminc sucking noted at breast (right side), Mother was able to pump several drops from left. Reviewed breastfeeding basics:  How milk is made and normal  breastfeeding behaviors discussed. Supply and demand,  stomach size, early feeding cues, skin to skin bonding with comfortable positioning and baby led latch-on reviewed. How to identify signs of successful breastfeeding sessions reviewed; education on assymetrical latch, signs of effective latching vs shallow, in-effective latching, normal  feeding frequency and duration and expected infant output discussed. Normal course of breastfeeding discussed including the AAP's recommendation that children receive exclusive breast milk feedings for the first six months of life with breast milk feedings to continue through the first year of life and/or beyond as complimentary table foods are added. Breastfeeding Booklet and Warm line information provided with discussion.   Discussed typical  weight loss and the importance of pediatrician appointment within 24-48 hours of discharge, at 2 weeks of life and normalcy of requesting pediatric weight checks as needed in between visits. Pt will successfully establish breastfeeding by feeding in response to early feeding cues   or wake every 3h, will obtain deep latch, and will keep log of feedings/output. Taught to BF at hunger cues and or q 2-3 hrs and to offer 10-20 drops of hand expressed colostrum at any non-feeds.       Breast Assessment  Left Breast: Medium, Large  Left Nipple: Flat  Right Breast: Medium, Large  Right Nipple: Everted  Breast- Feeding Assessment  Attends Breast-Feeding Classes: No  Breast-Feeding Experience: Yes  Breast Trauma/Surgery: No  Type/Quality: Good  Lactation Consultant Visits  Breast-Feedings: Good   Mother/Infant Observation  Mother Observation: Alignment, Breast comfortable, Close hold  Infant Observation: Lips flanged, lower, Lips flanged, upper, Opens mouth  LATCH Documentation  Latch: Grasps breast, tongue down, lips flanged, rhythmic sucking  Audible Swallowing: A few with stimulation  Type of Nipple: Everted (after stimulation)  Comfort (Breast/Nipple): Soft/non-tender  Hold (Positioning): No assist from staff, mother able to position/hold infant  LATCH Score: 9

## 2018-04-14 NOTE — ROUTINE PROCESS
0700: Bedside, Verbal and Written shift change report given to SAMPSON Barnhart RN (oncoming nurse) by Fonda Schwab, RN (offgoing nurse). Report included the following information SBAR, Kardex, Intake/Output, MAR, Accordion and Recent Results. 1645: TRANSFER - OUT REPORT:    Verbal report given to AZAR Vo RN(name) on [facility]  being transferred to MIU(unit) for routine progression of care       Report consisted of patients Situation, Background, Assessment and   Recommendations(SBAR). Information from the following report(s) SBAR, Kardex, Intake/Output, MAR, Accordion and Recent Results was reviewed with the receiving nurse. Lines:       Opportunity for questions and clarification was provided.       Patient transported with:   Registered Nurse

## 2018-04-15 VITALS
SYSTOLIC BLOOD PRESSURE: 130 MMHG | TEMPERATURE: 98.6 F | DIASTOLIC BLOOD PRESSURE: 64 MMHG | HEART RATE: 87 BPM | RESPIRATION RATE: 16 BRPM

## 2018-04-15 PROCEDURE — 74011250637 HC RX REV CODE- 250/637: Performed by: OBSTETRICS & GYNECOLOGY

## 2018-04-15 RX ADMIN — IBUPROFEN 800 MG: 800 TABLET ORAL at 06:28

## 2018-04-15 NOTE — DISCHARGE SUMMARY
Post-Partum Day Number 2 Progress/Discharge Note    Patient doing well post-partum without significant complaint. Baby nursing well. Pt is supplementing with formula. Plans Mirena after PP visit. Voiding without difficulty, normal lochia. Will have help at home. Vitals:  No data found. Temp (24hrs), Av.3 °F (36.8 °C), Min:97.9 °F (36.6 °C), Max:98.7 °F (37.1 °C)    Shannon depression score, 6. Vital signs stable, afebrile. Exam:  Patient without distress. Abdomen soft, fundus firm at level of umbilicus, nontender               Breasts: soft  Nipples yandel, intact               Perineum with normal lochia noted. Lower extremities are negative for swelling, cords or tenderness. Labs: No results found for this or any previous visit (from the past 24 hour(s)). Assessment and Plan:  Patient appears to be having uncomplicated post-partum course. Continue routine perineal care and maternal education. Plan discharge for today with follow up in our office in 6 weeks.

## 2018-04-15 NOTE — ROUTINE PROCESS
Bedside and Verbal shift change report given to Candido Woods RN (oncoming nurse) by Irma Orellana RN (offgoing nurse). Report included the following information SBAR, Kardex, Intake/Output and MAR.

## 2018-04-15 NOTE — PROGRESS NOTES
2200  Patient states she passed a quarter sized clot that looked \"grayish\" when using the bathroom. Clot was not visualized by nurse. Fundus -1 and firm, with scant bleeding. Patient instructed to call nurse if any clots pass again or if there is any increase in bleeding.

## 2018-04-15 NOTE — DISCHARGE INSTRUCTIONS
POST DELIVERY DISCHARGE INSTRUCTIONS    Name: Antonia Aguilera  YOB: 1990  Primary Diagnosis: Active Problems:    Pregnancy (4/13/2018)      POSTPARTUM DISCHARGE INSTRUCTIONS       Name:  Antonia Aguilera  YOB: 1990  Admission Diagnosis:  Pregnancy     Discharge Diagnosis:    Problem List as of 4/15/2018  Date Reviewed: 4/14/2018          Codes Class Noted - Resolved    Pregnancy ICD-10-CM: Z34.90  ICD-9-CM: V22.2  4/13/2018 - Present        Encounter for supervision of other normal pregnancy, first trimester ICD-10-CM: Z34.81  ICD-9-CM: V22.1  9/19/2017 - Present    Overview Addendum 4/9/2018  3:58 PM by Ramirez Bergman     Intrauterine pregnancy with the following problems identified:   EDC 4/21/2018 by [address] [place] [address] to [place] 2 weeks prior to conception - pt and partner no sx- advised to use condoms  SMA--low risk  Flu vaccine 10/12/17  9/26/17 NIPTS normal  Anemic 10.6 @ 27 wks  GBS- positive - PCN allergic, sensitive to clindamycin  Induction 4/16/18. Waitlist 4/17. Patient aware. Environmental allergies ICD-10-CM: Z91.09  ICD-9-CM: V15.09  3/23/2012 - Present        RESOLVED: Active labor at term ICD-10-CM: [id number]  ICD-9-CM: Sejal Issa  7/3/2015 - 11/2/2016        RESOLVED: Supervision of other normal pregnancy ICD-10-CM: Z34.80  ICD-9-CM: V22.1  12/8/2014 - 9/19/2017    Overview Addendum 4/14/2015 11:02 AM by Walt Nichols MD     [facility] 7/7/2015 by [address] today   Hx of ESCOBAR I/II - no followup; 2014 neg pap neg HPV  Flu vaccine received   NT and MSAFP wnl  Chlamydia pos 4/15 - retest end of June 2015 prior to delivery             RESOLVED: Insect bite ICD-10-CM: W57. Kiya Jose G  ICD-9-CM: 919.4, E906.4  2/5/2014 - 11/2/2016        RESOLVED: Skin lesion ICD-10-CM: L98.9  ICD-9-CM: 709.9  2/5/2014 - 11/2/2016        RESOLVED: Acute low back pain ICD-10-CM: M54.5  ICD-9-CM: 724.2  3/29/2012 - 11/2/2016            Attending Physician:  Walt Nichols MD    Delivery Type:  Vaginal Childbirth: What To Expect At Home    Your Recovery: Your body will slowly heal in the next few weeks. It is easy to get too tired and overwhelmed during the first weeks after your baby is born. Changes in your hormones can shift your mood without warning. You may find it hard to meet the extra demands on your energy and time. Take it easy on yourself. Follow-up care is a key part of your treatment and safety. Be sure to make and go to all appointments, and call your doctor if you are having problems. It's also a good idea to know your test results and keep a list of the medicines you take. How can you care for yourself at home? Vaginal bleeding and cramps  · After delivery, you will have a bloody discharge from the vagina. This will turn pink within a week and then white or yellow after about 10 days. It may last for 2 to 4 weeks or longer, until the uterus has healed. Use pads instead of tampons until you stop bleeding. · Do not worry if you pass some blood clots, as long as they are smaller than a golf ball. If you have a tear or stitches in your vaginal area, change the pad at least every 4 hours to prevent soreness and infection. · You may have cramps for the first few days after childbirth. These are normal and occur as the uterus shrinks to normal size. Take an over-the-counter pain medicine, such as acetaminophen (Tylenol), ibuprofen (Advil, Motrin), or naproxen (Aleve), for cramps. Read and follow all instructions on the label. Do not take aspirin, because it can cause more bleeding. Do not take acetaminophen (Tylenol) and other acetaminophen containing medications (i.e. Percocet) at the same time. Breast fullness  · Your breasts may overfill (engorge) in the first few days after delivery. To help milk flow and to relieve pain, warm your breasts in the shower or by using warm, moist towels before nursing. · If you are not nursing, do not put warmth on your breasts or touch your breasts.  Wear a tight bra or sports bra and use ice until the fullness goes away. This usually takes 2 to 3 days. · Put ice or a cold pack on your breast after nursing to reduce swelling and pain. Put a thin cloth between the ice and your skin. Activity  · Eat a balanced diet. Do not try to lose weight by cutting calories. Keep taking your prenatal vitamins, or take a multivitamin. · Get as much rest as you can. Try to take naps when your baby sleeps during the day. · Get some exercise every day. But do not do any heavy exercise until your doctor says it is okay. · Wait until you are healed (about 4 to 6 weeks) before you have sexual intercourse. Your doctor will tell you when it is okay to have sex. · Talk to your doctor about birth control. You can get pregnant even before your period returns. Also, you can get pregnant while you are breast-feeding. Mental Health  · Many women get the \"baby blues\" during the first few days after childbirth. You may lose sleep, feel irritable, and cry easily. You may feel happy one minute and sad the next. Hormone changes are one cause of these emotional changes. Also, the demands of a new baby, along with visits from relatives or other family needs, add to a mother's stress. The \"baby blues\" often peak around the fourth day. Then they ease up in less than 2 weeks. · If your moodiness or anxiety lasts for more than 2 weeks, or if you feel like life is not worth living, you may have postpartum depression. This is different for each mother. Some mothers with serious depression may worry intensely about their infant's well-being. Others may feel distant from their child. Some mothers might even feel that they might harm their baby. A mother may have signs of paranoia, wondering if someone is watching her. · With all the changes in your life, you may not know if you are depressed. Pregnancy sometimes causes changes in how you feel that are similar to the symptoms of depression.   · Symptoms of depression include:  · Feeling sad or hopeless and losing interest in daily activities. These are the most common symptoms of depression. · Sleeping too much or not enough. · Feeling tired. You may feel as if you have no energy. · Eating too much or too little. · POSTPARTUM [employer] (PSI) offers a Warm line; Chat with the Expert phone sessions; Information and Articles about Pregnancy and Postpartum Mood Disorders; Comprehensive List of Free Support Groups; Knowledgeable local coordinators who will offer support, information, and resources; Guide to Resources on [employer]; Calendar of events in the  mood disorders community; Latest News and Research; and [facility] [address] for [employer]. Remember - You are not alone; You are not to blame; With help, you will be well. 1-606-298-PPD(6349). WWW. POSTPARTUM. NET   · Writing or talking about death, such as writing suicide notes or talking about guns, knives, or pills. Keep the numbers for these national suicide hotlines: 9-207-384-TALK (0-849.463.3591) and 3-591-USSVKBB (9-643.663.8390). If you or someone you know talks about suicide or feeling hopeless, get help right away. Constipation and Hemorrhoids  · Drink plenty of fluids, enough so that your urine is light yellow or clear like water. If you have kidney, heart, or liver disease and have to limit fluids, talk with your doctor before you increase the amount of fluids you drink. · Eat plenty of fiber each day. Have a bran muffin or bran cereal for breakfast, and try eating a piece of fruit for a mid-afternoon snack. · For painful, itchy hemorrhoids, put ice or a cold pack on the area several times a day for 10 minutes at a time. Follow this by putting a warm compress on the area for another 10 to 20 minutes or by sitting in a shallow, warm bath. When should you call for help? Call 911 anytime you think you may need emergency care.  For example, call if:  · You are thinking of hurting yourself, your baby, or anyone else. · You passed out (lost consciousness). · You have symptoms of a blood clot in your lung (called a pulmonary embolism). These may include:    · Sudden chest pain. · Trouble breathing. · Coughing up blood. Call your doctor now or seek immediate medical care if:  · You have severe vaginal bleeding. · You are soaking through a pad each hour for 2 or more hours. · Your vaginal bleeding seems to be getting heavier or is still bright red 4 days after delivery. · You are dizzy or lightheaded, or you feel like you may faint. · You are vomiting or cannot keep fluids down. · You have a fever. · You have new or more belly pain. · You pass tissue (not just blood). · Your vaginal discharge smells bad. · Your belly feels tender or full and hard. · Your breasts are continuously painful or red. · You feel sad, anxious, or hopeless for more than a few days. · You have sudden, severe pain in your belly. · You have symptoms of a blood clot in your leg (called a deep vein thrombosis),          such as:  · Pain in your calf, back of the knee, thigh, or groin. · Redness and swelling in your leg or groin. · You have symptoms of preeclampsia, such as:  · Sudden swelling of your face, hands, or feet. · New vision problems (such as dimness or blurring). · A severe headache. · Your blood pressure is higher than it should be or rises suddenly. · You have new nausea or vomiting. Watch closely for changes in your health, and be sure to contact your doctor if you have any problems. Additional Information:  Postpartum Support    PARENTS:  Are you feeling sad or depressed? Is it difficult for you to enjoy yourself? Do you feel more irritable or tense? Do you feel anxious or panicky? Are you having difficulty bonding with your baby? Do you feel as if you are \"out of control\" or \"going crazy\"? Are you worried that you might hurt your baby or yourself? FAMILIES: Do you worry that something is wrong but don't know how to help? Do you think that your partner or spouse is having problems coping? Are you worried that it may never get better? While many women experience some mild mood change or \"the blues\" during or after the birth of a child, 1 in 9 women experience more significant symptoms of depression or anxiety. 1 in 10 Dads become depressed during the first year. Things you can do  Being a good parent includes taking care of yourself. If you take care of yourself, you will be able to take better care of your baby and your family. · Talk to a counselor or healthcare provider who has training in  mood and anxiety problems. · Learn as much as you can about pregnancy and postpartum depression and anxiety. · Get support from family and friends. Ask for help when you need it. · Join a support group in your area or online. · Keep active by walking, stretching or whatever form of exercise helps you to feel better. · Get enough rest and time for yourself. · Eat a healthy diet. · Don't give up! It may take more than one try to get the right help you need. These are general instructions for a healthy lifestyle:    No smoking/ No tobacco products/ Avoid exposure to second hand smoke    Surgeon General's Warning:  Quitting smoking now greatly reduces serious risk to your health. Obesity, smoking, and sedentary lifestyle greatly increases your risk for illness    A healthy diet, regular physical exercise & weight monitoring are important for maintaining a healthy lifestyle    Recognize signs and symptoms of STROKE:    F-face looks uneven    A-arms unable to move or move unevenly    S-speech slurred or non-existent    T-time-call 911 as soon as signs and symptoms begin - DO NOT go       back to bed or wait to see if you get better - TIME IS BRAIN. I have had the opportunity to make my options or choices for discharge.  I have received and understand these instructions. General:     Diet/Diet Restrictions:  Eight 8-ounce glasses of fluid daily (water, juices); avoid excessive caffeine intake. Meals/snacks as desired which are high in fiber and carbohydrates and low in fat and cholesterol. Physical Activity / Restrictions / Safety:     Avoid heavy lifting, no more that 8 lbs. For 2-3 weeks; No driving while taking narcotic pain medication. Post  patients should not drive until pain free. No intercourse 4-6 weeks, no douching or tampon use. May resume exercise in 6 weeks. Discharge Instructions/Special Treatment/Home Care Needs:     Continue prenatal vitamins. Continue to use squirt bottle with warm water on your episiotomy after each bathroom use until bleeding stops. If steri-strips applied to your incision, remove in 7 days. Take stool softeners daily. Call your doctor for the following:     Fever over 101 degrees by mouth. Vaginal bleeding heavier than a normal menstrual period or lost larger than a golf ball. Red streaks or increased swelling of legs, painful red streaks on your breast.  Painful urination, or increased pain, redness or discharge with your incision. Pain Management:     Pain Management:   Take Acetaminophen (Tylenol) or Ibuprofen (Advil, Motrin), as directed for pain. Use a warm Sitz bath 3 times daily to relieve episiotomy or hemorrhoidal discomfort. Heating pad to  incision as needed. For hemorrhoidal discomfort, use Tucks and Anusol cream as needed and directed.     Follow-Up Care:     Appointment with MD:   Follow-up Appointments   Procedures    FOLLOW UP VISIT Appointment in: 6 Weeks     Standing Status:   Standing     Number of Occurrences:   1     Order Specific Question:   Appointment in     Answer:   Sayra Gamez     Telephone number: [phone]    Signed By: Mathieu Eastman MD Date: 4/13/2018 Time: 1:20 PM

## 2018-04-15 NOTE — LACTATION NOTE
This note was copied from a baby's chart. Mother resting in bed. Mother states she is going to breastfeed when she gets home. Mother states she does not feel like she has any milk. Reassured mother she has milk. BF basics reviewed. Discharge and engorgement reviewed. Mothers questions answered. Reviewed breastfeeding basics:  How milk is made and normal  breastfeeding behaviors discussed. Supply and demand,  stomach size, early feeding cues, skin to skin bonding with comfortable positioning and baby led latch-on reviewed. How to identify signs of successful breastfeeding sessions reviewed; education on assymetrical latch, signs of effective latching vs shallow, in-effective latching, normal  feeding frequency and duration and expected infant output discussed. Normal course of breastfeeding discussed including the AAP's recommendation that children receive exclusive breast milk feedings for the first six months of life with breast milk feedings to continue through the first year of life and/or beyond as complimentary table foods are added. Breastfeeding Booklet and Warm line information provided with discussion. Discussed typical  weight loss and the importance of pediatrician appointment within 24-48 hours of discharge, at 2 weeks of life and normalcy of requesting pediatric weight checks as needed in between visits    . Chart shows numerous feedings, void, stool WNL. Discussed importance of monitoring outputs and feedings on first week of life. Discussed ways to tell if baby is  getting enough breast milk, ie  voids and stools, change in color of stool, and return to birth wt within 2 weeks. Follow up with pediatrician visit for weight check in 1-2 days (per AAP guidelines.)  Encouraged to call Warm Line  [phone] or [facility] at [phone] for any questions/problems that arise.  Mother also given breastfeeding support group dates and times for any future needs    Engorgement Care Guidelines:  Reviewed how milk is made and normal phases of milk production. Taught care of engorged breasts - frequent breastfeeding encouraged, cool packs and motrin as tolerated. Anticipatory guidance shared. Pt will successfully establish breastfeeding by feeding in response to early feeding cues   or wake every 3h, will obtain deep latch, and will keep log of feedings/output. Taught to BF at hunger cues and or q 2-3 hrs and to offer 10-20 drops of hand expressed colostrum at any non-feeds.       Breast Assessment  Left Breast: Medium, Large  Left Nipple: Flat  Right Breast: Medium, Large  Right Nipple: Everted  Breast- Feeding Assessment  Attends Breast-Feeding Classes: No  Breast-Feeding Experience: Yes  Breast Trauma/Surgery: No  Type/Quality: Good  Lactation Consultant Visits  Breast-Feedings: Not breast-feeding (mother states she will try at home)  Mother/Infant Observation  Mother Observation: Alignment, Breast comfortable, Close hold  Infant Observation: Lips flanged, lower, Lips flanged, upper, Opens mouth  LATCH Documentation  Latch:  (did not see at breast)  Audible Swallowing: A few with stimulation  Type of Nipple: Everted (after stimulation)  Comfort (Breast/Nipple): Soft/non-tender  Hold (Positioning): No assist from staff, mother able to position/hold infant  LATCH Score: 9

## 2018-04-15 NOTE — ROUTINE PROCESS
Patient off unit in stable condition via wheelchair with volunteers for discharge home per  MD.  Patient is to follow up in 6 weeks and is aware. Patient denies H/A, dizziness, nausea and or vomiting or pain at this time. Infant in car seat with mom.

## 2018-05-15 ENCOUNTER — TELEPHONE (OUTPATIENT)
Dept: OBGYN CLINIC | Age: 28
End: 2018-05-15

## 2018-05-15 NOTE — TELEPHONE ENCOUNTER
Patient does not have her PP scheduled. She due 05/25/2018 and supposed to return back to work 5/29/18. Can she bee seen before 5/29/18 for her PP visit? Please let me know.

## 2018-05-15 NOTE — TELEPHONE ENCOUNTER
Sent patient a [employer] message seeing if she can come this Friday. I already scheduled in [address].

## 2018-05-15 NOTE — TELEPHONE ENCOUNTER
Patient aware of appt scheduled for this Friday at 3pm per [place] [facility] e-mail.

## 2018-05-15 NOTE — TELEPHONE ENCOUNTER
Patient calling stating that she is due for her 6 week post partum by the 25th of May and patient is interested in getting the IUD at her post partum appt but she is concerned about her previous allergic reactions to other birth control, she wanted to know which IUD Dr. Spring Barnett would recommend. Please advise of which IUD she should use and ok to book for the insertion at time of post partum appt?

## 2018-05-18 ENCOUNTER — OFFICE VISIT (OUTPATIENT)
Dept: OBGYN CLINIC | Age: 28
End: 2018-05-18

## 2018-05-18 VITALS
DIASTOLIC BLOOD PRESSURE: 78 MMHG | SYSTOLIC BLOOD PRESSURE: 106 MMHG | HEIGHT: 67 IN | WEIGHT: 203 LBS | BODY MASS INDEX: 31.86 KG/M2

## 2018-05-18 NOTE — PATIENT INSTRUCTIONS
Pelvic Exam: Care Instructions  Your Care Instructions    When your doctor examines all of your pelvic organs, it's called a pelvic exam. Two good reasons to have this kind of exam are to check for sexually transmitted infections (STIs) and to get a Pap test. A Pap test is also called a Pap smear. It checks for early changes that can lead to cancer of the cervix. Sometimes a pelvic exam is part of a regular checkup. In this case, you can do some things to make your test results as accurate as possible. · Try to schedule the exam when you don't have your period. · Don't use douches, tampons, or vaginal medicines, sprays, or powders for 24 hours before your exam.  · Don't have sex for 24 hours before your exam.  Other times, women have this kind of exam at any time of the month. This is because they have pelvic pain, bleeding, or discharge. Or they may have another pelvic problem. Before your exam, it's important to share some information with your doctor. For example, if you are a survivor of rape or sexual abuse, you can talk about any concerns you may have. Your doctor will also want to know if you are pregnant or use birth control. And he or she will want to hear about any problems, surgeries, or procedures you have had in your pelvic area. You will also need to tell your doctor when your last period was. Follow-up care is a key part of your treatment and safety. Be sure to make and go to all appointments, and call your doctor if you are having problems. It's also a good idea to know your test results and keep a list of the medicines you take. How is a pelvic exam done? · During a pelvic exam, you will:  ¨ Take off your clothes below the waist. You will get a paper or cloth cover to put over the lower half of your body. Gareld Maria Isabel on your back on an exam table. Your feet will be raised above you. Stirrups will support your feet. · The doctor will:  Madelyn Copping you to relax your knees.  Your knees need to lean out, toward the walls. ¨ Check the opening of your vagina for sores or swelling. ¨ Gently put a tool called a speculum into your vagina. It opens the vagina a little bit. You will feel some pressure. But if you are relaxed, it will not hurt. It lets your doctor see inside the vagina. ¨ Use a small brush, spatula, or swab to get a sample of cells, if you are having a Pap test or culture. The doctor then removes the speculum. ¨ Put on gloves and put one or two fingers of one hand into your vagina. The other hand goes on your lower belly. This lets your doctor feel your pelvic organs. You will probably feel some pressure. Try to stay relaxed. ¨ Put one gloved finger into your rectum and one into your vagina, if needed. This can also help check your pelvic organs. This exam takes about 10 minutes. At the end, you will get a washcloth or tissue to clean your vaginal area. It's normal to have some discharge after this exam. You can then get dressed. Some test results may be ready right away. But results from a culture or a Pap test may take several days or a few weeks. Why should you have a pelvic exam?  · You want to have recommended screening tests. This includes a Pap test.  · You think you have a vaginal infection. Signs include itching, burning, or unusual discharge. · You might have been exposed to a sexually transmitted infection (STI), such as chlamydia or herpes. · You have vaginal bleeding that is not part of your normal menstrual period. · You have pain in your belly or pelvis. · You have been sexually assaulted. A pelvic exam lets your doctor collect evidence and check for STIs. · You are pregnant. · You are having trouble getting pregnant. What are the risks of a pelvic exam?  There are no risks from a pelvic exam.  When should you call for help? Watch closely for changes in your health, and be sure to contact your doctor if you have any problems. Where can you learn more?   Go to http://ama-alyson.info/. Enter F000 in the search box to learn more about \"Pelvic Exam: Care Instructions. \"  Current as of: October 13, 2016  Content Version: 11.4  © 7503-3312 Healthwise, [employer]. Care instructions adapted under license by [employer] (which disclaims liability or warranty for this information). If you have questions about a medical condition or this instruction, always ask your healthcare professional. [place] [address] any warranty or liability for your use of this information.

## 2018-05-18 NOTE — PROGRESS NOTES
Postpartum evaluation    Nereyda Bills is a 32 y.o. female who presents for a postpartum exam.     She is now 5 weeks post normal spontaneous vaginal delivery. Her baby is doing well. She has had no menses since delivery. She has had the following significant problems since her delivery: none    The patient is bottle feeding without difficulty. She did breast feed the first 3 weeks     The patient would like to discuss the different IUDs for birth control. She is currently taking: PNVs.     She is due for her next AE in 3 months.      Visit Vitals    /60    Ht 5' 7\" (1.702 m)    Wt 203 lb (92.1 kg)    Breastfeeding No    BMI 31.79 kg/m2       PHYSICAL EXAMINATION    Constitutional  · Appearance: well-nourished, well developed, alert, in no acute distress    HENT  · Head and Face: appears normal    Neck  · Inspection/Palpation: normal appearance, no masses or tenderness  · Lymph Nodes: no lymphadenopathy present  · Thyroid: gland size normal, nontender, no nodules or masses present on palpation    Breasts  · Inspection of Breasts: breasts symmetrical, no skin changes, no discharge present, nipple appearance normal, no skin retraction present  · Palpation of Breasts and Axillae: no masses present on palpation, no breast tenderness  · Axillary Lymph Nodes: no lymphadenopathy present    Gastrointestinal  · Abdominal Examination: abdomen non-tender to palpation, normal bowel sounds, no masses present  · Liver and spleen: no hepatomegaly present, spleen not palpable  · Hernias: no hernias identified    Genitourinary  · External Genitalia: normal appearance for age, no discharge present, no tenderness present, no inflammatory lesions present, no masses present, no atrophy present  · Vagina: normal vaginal vault without central or paravaginal defects, no discharge present, no inflammatory lesions present, no masses present  · Bladder: non-tender to palpation  · Urethra: appears normal  · Cervix: normal   · Uterus: normal size, shape and consistency  · Adnexa: no adnexal tenderness present, no adnexal masses present  · Perineum: perineum within normal limits, no evidence of trauma, no rashes or skin lesions present  · Anus: anus within normal limits, no hemorrhoids present  · Inguinal Lymph Nodes: no lymphadenopathy present    Skin  · General Inspection: no rash, no lesions identified    Neurologic/Psychiatric  · Mental Status:  · Orientation: grossly oriented to person, place and time  · Mood and Affect: mood normal, affect appropriate    Assessment:  Normal postpartum check    Plan:  RTO for AE.   Call with menses for Mirena

## 2018-05-25 ENCOUNTER — OFFICE VISIT (OUTPATIENT)
Dept: OBGYN CLINIC | Age: 28
End: 2018-05-25

## 2018-05-25 VITALS
SYSTOLIC BLOOD PRESSURE: 122 MMHG | BODY MASS INDEX: 32.18 KG/M2 | HEIGHT: 67 IN | WEIGHT: 205 LBS | DIASTOLIC BLOOD PRESSURE: 78 MMHG

## 2018-05-25 DIAGNOSIS — Z30.430 ENCOUNTER FOR IUD INSERTION: Primary | ICD-10-CM

## 2018-05-25 LAB
HCG URINE, QL. (POC): NEGATIVE
VALID INTERNAL CONTROL?: YES

## 2018-05-25 NOTE — PATIENT INSTRUCTIONS
Intrauterine Device (IUD) Insertion: Care Instructions  Your Care Instructions    The intrauterine device (IUD) is a very effective method of birth control. It is a small, plastic, T-shaped device that contains copper or hormones. The doctor inserts the IUD into your uterus. A plastic string tied to the end of the IUD hangs down through the cervix into the vagina. There are two types of IUDs. The copper IUD is effective for up to 10 years. The hormonal IUD is effective for either 3 years or 5 years, depending on which IUD is used. The hormonal IUD also reduces menstrual bleeding and cramping. Both types of IUD damage or kill the man's sperm. This means that the woman's egg does not join with the sperm. IUDs also change the lining of the uterus so that the egg does not lodge there. The IUD is most likely to work well for women who have been pregnant before. Some women who have never been pregnant have more trouble keeping the IUD in the uterus. They also may have more pain and cramping after insertion. Follow-up care is a key part of your treatment and safety. Be sure to make and go to all appointments, and call your doctor if you are having problems. It's also a good idea to know your test results and keep a list of the medicines you take. How can you care for yourself at home? · You may experience some mild cramping and light bleeding (spotting) for 1 or 2 days. Use a hot water bottle or a heating pad set on low on your belly for pain. · Take an over-the-counter pain medicine, such as acetaminophen (Tylenol), ibuprofen (Advil, Motrin), and naproxen (Aleve) if needed. Read and follow all instructions on the label. · Do not take two or more pain medicines at the same time unless the doctor told you to. Many pain medicines have acetaminophen, which is Tylenol. Too much acetaminophen (Tylenol) can be harmful. · Check the string of your IUD after every period.  To do this, insert a finger into your vagina and feel for the cervix, which is at the top of the vagina and feels harder than the rest of your vagina. You should be able to feel the thin, plastic string coming out of the opening of your cervix. If you cannot feel the string, use another form of birth control and make an appointment with your doctor to have the string checked. · If the IUD comes out, save it and call your doctor. Be sure to use another form of birth control while the IUD is out. · Use latex condoms to protect against sexually transmitted infections (STIs), such as gonorrhea and chlamydia. An IUD does not protect you from STIs. Having one sex partner (who does not have STIs and does not have sex with anyone else) is a good way to avoid STIs. When should you call for help? Call 911 anytime you think you may need emergency care. For example, call if:  ? · You passed out (lost consciousness). ? · You have sudden, severe pain in your belly or pelvis. ?Call your doctor now or seek immediate medical care if:  ? · You have new belly or pelvic pain. ? · You have severe vaginal bleeding. This means that you are soaking through your usual pads or tampons each hour for 2 or more hours. ? · You are dizzy or lightheaded, or you feel like you may faint. ? · You have a fever and pelvic pain or vaginal discharge. ? · You have pelvic pain that is getting worse. ? Watch closely for changes in your health, and be sure to contact your doctor if:  ? · You cannot feel the string, or the IUD comes out. ? · You feel sick to your stomach, or you vomit. ? · You think you may be pregnant. Where can you learn more? Go to http://ama-alyson.info/. Enter E434 in the search box to learn more about \"Intrauterine Device (IUD) Insertion: Care Instructions. \"  Current as of: March 16, 2017  Content Version: 11.4  © 6088-8136 [employer].  Care instructions adapted under license by [employer] (which disclaims liability or warranty for this information). If you have questions about a medical condition or this instruction, always ask your healthcare professional. [place] [address] any warranty or liability for your use of this information.

## 2018-05-25 NOTE — PROGRESS NOTES
[facility]  OFFICE PROCEDURE PROGRESS NOTE        Chart reviewed for the following:   Lianet FIGUEROA, have reviewed the History, Physical and updated the Allergic reactions for [address] performed immediately prior to start of procedure:   Lianet FIGUEROA, have performed the following [facility] the start of the procedure:            * Patient was identified by name and date of birth   * Agreement on procedure being performed was verified  * Risks and Benefits explained to the patient  * Procedure site verified and marked as necessary  * Patient was positioned for comfort  * Consent was signed and verified     Time: 3:21p      Date of procedure: 2018    Procedure performed by:  Mathieu Eastman MD    Patient assisted by: self    How tolerated by patient: tolerated the procedure well with no complications    Post Procedural Pain Scale: 2 - Hurts Little Bit    Comments: none      Mirena IUD INSERTION  Indications:  Joan Norwood is a ,  32 y.o. female [address] Patient's last menstrual period was 2018 (approximate). Her LMP was normal in duration and amount of flow. She presents for insertion of an IUD. The risks, benefits and alternatives of IUD insertion were discussed in detail at last visit. She also has reviewed Mirena information. She has elected to proceed with the insertion today and she states she has no further questions. A urine pregnancy test was negative   Procedure: The pelvic exam revealed normal external genitalia. On bimanual exam the uterus was anteverted and normal in size with no tenderness present. A speculum was inserted into the vagina and the cervix was visualized. The cervix was prepped with zephiran solution. The anterior lip of the cervix was grasped with a single toothed tenaculum. The uterus was sounded with a Blanco sound to 9 centimeters.  A Mirena was then inserted without difficulty. The string was cut to 3 centimeters. She experienced a mild  amount of cramping. Post Procedure Status:   She tolerated the procedure with mild discomfort. The patient was observed for 10 minutes after the insertion. There were no complications. Patient was discharged in stable condition. The patient received Mirena lot number [id number].     Disc bleeding, infection, expulsion  US in 4 weeks

## 2018-06-29 ENCOUNTER — OFFICE VISIT (OUTPATIENT)
Dept: OBGYN CLINIC | Age: 28
End: 2018-06-29

## 2018-06-29 VITALS
BODY MASS INDEX: 31.83 KG/M2 | SYSTOLIC BLOOD PRESSURE: 118 MMHG | WEIGHT: 202.8 LBS | DIASTOLIC BLOOD PRESSURE: 70 MMHG | HEIGHT: 67 IN

## 2018-06-29 DIAGNOSIS — Z30.431 IUD CHECK UP: Primary | ICD-10-CM

## 2018-06-29 DIAGNOSIS — N92.6 IRREGULAR BLEEDING: ICD-10-CM

## 2018-06-29 NOTE — PATIENT INSTRUCTIONS
Pelvic Exam: Care Instructions  Your Care Instructions    When your doctor examines all of your pelvic organs, it's called a pelvic exam. Two good reasons to have this kind of exam are to check for sexually transmitted infections (STIs) and to get a Pap test. A Pap test is also called a Pap smear. It checks for early changes that can lead to cancer of the cervix. Sometimes a pelvic exam is part of a regular checkup. In this case, you can do some things to make your test results as accurate as possible. · Try to schedule the exam when you don't have your period. · Don't use douches, tampons, or vaginal medicines, sprays, or powders for 24 hours before your exam.  · Don't have sex for 24 hours before your exam.  Other times, women have this kind of exam at any time of the month. This is because they have pelvic pain, bleeding, or discharge. Or they may have another pelvic problem. Before your exam, it's important to share some information with your doctor. For example, if you are a survivor of rape or sexual abuse, you can talk about any concerns you may have. Your doctor will also want to know if you are pregnant or use birth control. And he or she will want to hear about any problems, surgeries, or procedures you have had in your pelvic area. You will also need to tell your doctor when your last period was. Follow-up care is a key part of your treatment and safety. Be sure to make and go to all appointments, and call your doctor if you are having problems. It's also a good idea to know your test results and keep a list of the medicines you take. How is a pelvic exam done? · During a pelvic exam, you will:  ¨ Take off your clothes below the waist. You will get a paper or cloth cover to put over the lower half of your body. Kyra Sermons on your back on an exam table. Your feet will be raised above you. Stirrups will support your feet. · The doctor will:  Humberto Console you to relax your knees.  Your knees need to lean out, toward the walls. ¨ Check the opening of your vagina for sores or swelling. ¨ Gently put a tool called a speculum into your vagina. It opens the vagina a little bit. You will feel some pressure. But if you are relaxed, it will not hurt. It lets your doctor see inside the vagina. ¨ Use a small brush, spatula, or swab to get a sample of cells, if you are having a Pap test or culture. The doctor then removes the speculum. ¨ Put on gloves and put one or two fingers of one hand into your vagina. The other hand goes on your lower belly. This lets your doctor feel your pelvic organs. You will probably feel some pressure. Try to stay relaxed. ¨ Put one gloved finger into your rectum and one into your vagina, if needed. This can also help check your pelvic organs. This exam takes about 10 minutes. At the end, you will get a washcloth or tissue to clean your vaginal area. It's normal to have some discharge after this exam. You can then get dressed. Some test results may be ready right away. But results from a culture or a Pap test may take several days or a few weeks. Why should you have a pelvic exam?  · You want to have recommended screening tests. This includes a Pap test.  · You think you have a vaginal infection. Signs include itching, burning, or unusual discharge. · You might have been exposed to a sexually transmitted infection (STI), such as chlamydia or herpes. · You have vaginal bleeding that is not part of your normal menstrual period. · You have pain in your belly or pelvis. · You have been sexually assaulted. A pelvic exam lets your doctor collect evidence and check for STIs. · You are pregnant. · You are having trouble getting pregnant. What are the risks of a pelvic exam?  There are no risks from a pelvic exam.  When should you call for help? Watch closely for changes in your health, and be sure to contact your doctor if you have any problems. Where can you learn more?   Go to http://ama-alyson.info/. Enter O662 in the search box to learn more about \"Pelvic Exam: Care Instructions. \"  Current as of: October 13, 2016  Content Version: 11.4  © 5465-3675 Healthwise, [employer]. Care instructions adapted under license by "[employer] (which disclaims liability or warranty for this information). If you have questions about a medical condition or this instruction, always ask your healthcare professional. [place] [address] any warranty or liability for your use of this information.

## 2018-06-29 NOTE — PROGRESS NOTES
This is a follow-up visit for Declan Tripathi is a G4 [id number],  32 y.o. female [address] whose No LMP recorded. Patient is not currently having periods (Reason: IUD). She had a Mirena IUD placed six weeks ago. Since the IUD placement, the patient has not had any unusual complaints. She has had some mild non-menstrual bleeding. She describes having a light amount of blood-tinged discharge which has occurred off and on since insertion of the Mirena. She has not had significant pain. She has had no fever. Associated signs and symptoms: she denies dyspareunia, expulsion, heavy bleeding, increased pain, fever, and pelvic pain. Ultrasound was done today and revealed appropriate placement of the IUD in the endometrial cavity. UTERUS IS RETROVERTED, NORMAL IN SIZE AND ECHOGENICITY. ENDOMETRIUM MEASURES 4MM IN THICKNESS. NO EVIDENCE OF MASS OR ABNORMALITY SEEN WITHIN  THE ENDOMETRIAL CAVITY. IUD IS SEEN IN THE PROPER POSITION WITHIN THE ENDOMETRIAL CAVITY IN THE UTERINE FUNDUS. RIGHT OVARY APPEARS WITHIN NORMAL LIMITS. MULTIPLE FOLLICULAR CYSTS ARE SEEN. LEFT OVARY APPEARS WITHIN NORMAL LIMITS. NO FREE FLUID SEEN IN THE CDS. Past Medical History:   Diagnosis Date    Chlamydia 1/8/2014    ESCOBAR II (cervical intraepithelial neoplasia II)     Dysplasia of cervix, low grade (ESCOBAR 1)     Encounter for insertion of mirena IUD 05/25/2018    Mirena placed     Past Surgical History:   Procedure Laterality Date    HX ACL RECONSTRUCTION  1/2008    HX COLPOSCOPY      4/28/10- ecto ESCOBAR 2----4/5/12 ESCOBAR 1---7/18/13-negative    HX LAP CHOLECYSTECTOMY  10/04/2016    Laparoscopic cholecystectomy by Dr. Dari Aguirre.  HX ORTHOPAEDIC      left ACL surgery    HX OTHER SURGICAL      HX TONSILLECTOMY       Social History     Occupational History    Not on file.      Social History Main Topics    Smoking status: Former Smoker    Smokeless tobacco: Never Used    Alcohol use Yes    Drug use: No    Sexual activity: Not Currently     Partners: Male     Birth control/ protection: None     Family History   Problem Relation Age of Onset    No Known Problems Mother     No Known Problems Father     Diabetes Maternal Uncle     Hypertension Maternal Grandmother        Allergies   Allergen Reactions    Depo-Provera [Medroxyprogesterone] Hives    Reclipsen (29) [Desogestrel-Ethinyl Estradiol] Hives    Sulfa (Sulfonamide Antibiotics) Hives     As child    Pcn [Penicillins] Hives     Prior to Admission medications    Medication Sig Start Date End Date Taking? Authorizing Provider   [id number] grant-folic-dha (PRENATAL DHA+COMPLETE PRENATAL) [id number] mg-mcg-mg cmpk Take  by mouth.     Historical Provider        Review of Systems: History obtained from the patient  Constitutional: negative for weight loss, fever, night sweats  Breast: negative for breast lumps, nipple discharge, galactorrhea  GI: negative for change in bowel habits, abdominal pain, black or bloody stools  : negative for frequency, dysuria, hematuria, vaginal discharge  MSK: negative for back pain, joint pain, muscle pain  Skin: negative for itching, rash, hives  Psych: negative for anxiety, depression, change in mood      Objective:  Visit Vitals    /70    Ht 5' 7\" (1.702 m)    Wt 202 lb 12.8 oz (92 kg)    Breastfeeding No    BMI 31.76 kg/m2       Physical Exam:   PHYSICAL EXAMINATION    Constitutional  · Appearance: well-nourished, well developed, alert, in no acute distress    Gastrointestinal  · Abdominal Examination: abdomen non-tender to palpation, normal bowel sounds, no masses present  · Liver and spleen: no hepatomegaly present, spleen not palpable  · Hernias: no hernias identified    Genitourinary  · External Genitalia: normal appearance for age, no discharge present, no tenderness present, no inflammatory lesions present, no masses present, no atrophy present  · Vagina: normal vaginal vault without central or paravaginal defects, no discharge present, no inflammatory lesions present, no masses present  · Bladder: non-tender to palpation  · Urethra: appears normal  · Cervix: normal with IUD string visible and appropriate length   · Uterus: normal size, shape and consistency  · Adnexa: no adnexal tenderness present, no adnexal masses present  · Perineum: perineum within normal limits, no evidence of trauma, no rashes or skin lesions present    Skin  · General Inspection: no rash, no lesions identified    Neurologic/Psychiatric  · Mental Status:  · Orientation: grossly oriented to person, place and time  · Mood and Affect: mood normal, affect appropriate    Assessment:   Doing well with IUD    Plan:   AE

## 2018-09-07 ENCOUNTER — OFFICE VISIT (OUTPATIENT)
Dept: OBGYN CLINIC | Age: 28
End: 2018-09-07

## 2018-09-07 VITALS
WEIGHT: 204 LBS | HEIGHT: 67 IN | BODY MASS INDEX: 32.02 KG/M2 | DIASTOLIC BLOOD PRESSURE: 70 MMHG | SYSTOLIC BLOOD PRESSURE: 122 MMHG

## 2018-09-07 DIAGNOSIS — Z01.419 ENCOUNTER FOR GYNECOLOGICAL EXAMINATION WITHOUT ABNORMAL FINDING: Primary | ICD-10-CM

## 2018-09-07 NOTE — PATIENT INSTRUCTIONS
Breast Self-Exam: Care Instructions Your Care Instructions A breast self-exam is when you check your breasts for lumps or changes. This regular exam helps you learn how your breasts normally look and feel. Most breast problems or changes are not because of cancer. Breast self-exam is not a substitute for a mammogram. Having regular breast exams by your doctor and regular mammograms improve your chances of finding any problems with your breasts. Some women set a time each month to do a step-by-step breast self-exam. Other women like a less formal system. They might look at their breasts as they brush their teeth, or feel their breasts once in a while in the shower. If you notice a change in your breast, tell your doctor. Follow-up care is a key part of your treatment and safety. Be sure to make and go to all appointments, and call your doctor if you are having problems. It's also a good idea to know your test results and keep a list of the medicines you take. How do you do a breast self-exam? 
· The best time to examine your breasts is usually one week after your menstrual period begins. Your breasts should not be tender then. If you do not have periods, you might do your exam on a day of the month that is easy to remember. · To examine your breasts: ¨ Remove all your clothes above the waist and lie down. When you are lying down, your breast tissue spreads evenly over your chest wall, which makes it easier to feel all your breast tissue. ¨ Use the pads-not the fingertips-of the 3 middle fingers of your left hand to check your right breast. Move your fingers slowly in small coin-sized circles that overlap. ¨ Use three levels of pressure to feel of all your breast tissue. Use light pressure to feel the tissue close to the skin surface. Use medium pressure to feel a little deeper. Use firm pressure to feel your tissue close to your breastbone and ribs.  Use each pressure level to feel your breast tissue before moving on to the next spot. ¨ Check your entire breast, moving up and down as if following a strip from the collarbone to the bra line, and from the armpit to the ribs. Repeat until you have covered the entire breast. 
¨ Repeat this procedure for your left breast, using the pads of the 3 middle fingers of your right hand. · To examine your breasts while in the shower: 
¨ Place one arm over your head and lightly soap your breast on that side. ¨ Using the pads of your fingers, gently move your hand over your breast (in the strip pattern described above), feeling carefully for any lumps or changes. ¨ Repeat for the other breast. 
· Have your doctor inspect anything you notice to see if you need further testing. Where can you learn more? Go to http://ama-alyson.info/. Enter P148 in the search box to learn more about \"Breast Self-Exam: Care Instructions. \" Current as of: May 12, 2017 Content Version: 11.7 © 3998-8550 [employer]. Care instructions adapted under license by [employer] (which disclaims liability or warranty for this information). If you have questions about a medical condition or this instruction, always ask your healthcare professional. [place] [address] any warranty or liability for your use of this information.

## 2018-09-07 NOTE — PROGRESS NOTES
[place] is a ,  29 y.o. female [address] whose No LMP recorded. Patient is not currently having periods (Reason: IUD). who presents for her annual checkup. She is having no significant problems. Menstrual status: 
 
Her periods are spotting, light in flow consistently since Mirena was placed 3 months ago. When she does have her cycle bleed fairly heavy. Only 3 days a month with no bleeding She denies dysmenorrhea. She reports no premenstrual symptoms. Contraception: The current method of family planning is IUD. Sexual history: She  reports that she currently engages in sexual activity and has had male partners. She reports using the following methods of birth control/protection: None and IUD. Medical conditions: 
 
Since her last annual GYN exam about one year ago, she has not the following changes in her health history: none. Pap and Mammogram History: 
 
Her most recent Pap smear was normal obtained 2016. The patient has never had a mammogram. 
 
The patient does not have a family history of breast cancer. Past Medical History:  
Diagnosis Date  Chlamydia 2014  ESCOBAR II (cervical intraepithelial neoplasia II)  Dysplasia of cervix, low grade (ESCOBAR 1)  Encounter for insertion of mirena IUD 2018 Mirena placed Past Surgical History:  
Procedure Laterality Date  HX ACL RECONSTRUCTION  2008  HX COLPOSCOPY    
 4/28/10- ecto ESCOBAR 2----12 ESCOBAR 1---13-negative  HX LAP CHOLECYSTECTOMY  10/04/2016 Laparoscopic cholecystectomy by Dr. Andres Mccall.  HX ORTHOPAEDIC    
 left ACL surgery  HX OTHER SURGICAL    
 HX TONSILLECTOMY Allergies: Depo-provera [medroxyprogesterone]; Reclipsen (28) [desogestrel-ethinyl estradiol]; Sulfa (sulfonamide antibiotics); and Pcn [penicillins] Social History Social History  Marital status: SINGLE   Spouse name: N/A  
 Number of children: N/A  
  Years of education: N/A Occupational History  Not on file. Social History Main Topics  Smoking status: Former Smoker  Smokeless tobacco: Never Used  Alcohol use Yes  Drug use: No  
 Sexual activity: Not Currently Partners: Male Birth control/ protection: None Other Topics Concern  Not on file Social History Narrative Tobacco History:  reports that she has quit smoking. She has never used smokeless tobacco. 
Alcohol Abuse:  reports that she drinks alcohol. Drug Abuse:  reports that she does not use illicit drugs. Patient Active Problem List  
Diagnosis Code  Environmental allergies Z91.09  
 Encounter for supervision of other normal pregnancy, first trimester Z34.81  
 Pregnancy Z34.90 Review of Systems - History obtained from the patient Constitutional: negative for weight loss, fever, night sweats HEENT: negative for hearing loss, earache, congestion, snoring, sorethroat CV: negative for chest pain, palpitations, edema Resp: negative for cough, shortness of breath, wheezing GI: negative for change in bowel habits, abdominal pain, black or bloody stools : negative for frequency, dysuria, hematuria, vaginal discharge MSK: negative for back pain, joint pain, muscle pain Breast: negative for breast lumps, nipple discharge, galactorrhea Skin :negative for itching, rash, hives Neuro: negative for dizziness, headache, confusion, weakness Psych: negative for anxiety, depression, change in mood Heme/lymph: negative for bleeding, bruising, pallor Physical Exam 
 
Visit Vitals  /70 (BP 1 Location: Left arm, BP Patient Position: Sitting)  Ht 5' 7\" (1.702 m)  Wt 204 lb (92.5 kg)  Breastfeeding No  
 BMI 31.95 kg/m2 Constitutional 
· Appearance: well-nourished, well developed, alert, in no acute distress HENT 
· Head and Face: appears normal 
 
Neck · Inspection/Palpation: normal appearance, no masses or tenderness · Lymph Nodes: no lymphadenopathy present · Thyroid: gland size normal, nontender, no nodules or masses present on palpation Chest 
· Respiratory Effort: breathing normal 
· Auscultation: normal breath sounds Cardiovascular · Heart: 
· Auscultation: regular rate and rhythm without murmur Breasts · Inspection of Breasts: breasts symmetrical, no skin changes, no discharge present, nipple appearance normal, no skin retraction present · Palpation of Breasts and Axillae: no masses present on palpation, no breast tenderness · Axillary Lymph Nodes: no lymphadenopathy present Gastrointestinal 
· Abdominal Examination: abdomen non-tender to palpation, normal bowel sounds, no masses present · Liver and spleen: no hepatomegaly present, spleen not palpable · Hernias: no hernias identified Genitourinary · External Genitalia: normal appearance for age, no discharge present, no tenderness present, no inflammatory lesions present, no masses present, no atrophy present · Vagina: normal vaginal vault without central or paravaginal defects, no discharge present, no inflammatory lesions present, no masses present · Bladder: non-tender to palpation · Urethra: appears normal 
· Cervix: normal, string seen · Uterus: normal size, shape and consistency · Adnexa: no adnexal tenderness present, no adnexal masses present · Perineum: perineum within normal limits, no evidence of trauma, no rashes or skin lesions present · Anus: anus within normal limits, no hemorrhoids present · Inguinal Lymph Nodes: no lymphadenopathy present Skin · General Inspection: no rash, no lesions identified Neurologic/Psychiatric · Mental Status: · Orientation: grossly oriented to person, place and time · Mood and Affect: mood normal, affect appropriate [facility] Assessment: 
Routine gynecologic examination Her current medical status is satisfactory with no evidence of significant gynecologic issues.  
Heavy bleeding on IUD 
 Plan: 
Counseled re: diet, exercise, healthy lifestyle Return for yearly wellness visits Pt counseled regarding co-testing for high risk HPV with pap Ultrasound to check IUD

## 2018-09-12 LAB
CYTOLOGIST CVX/VAG CYTO: NORMAL
CYTOLOGY CVX/VAG DOC THIN PREP: NORMAL
CYTOLOGY HISTORY:: NORMAL
DX ICD CODE: NORMAL
LABCORP, 190119: NORMAL
Lab: NORMAL
Lab: NORMAL
OTHER STN SPEC: NORMAL
PATH REPORT.FINAL DX SPEC: NORMAL
STAT OF ADQ CVX/VAG CYTO-IMP: NORMAL

## 2018-11-07 ENCOUNTER — OFFICE VISIT (OUTPATIENT)
Dept: FAMILY MEDICINE CLINIC | Age: 28
End: 2018-11-07

## 2018-11-07 VITALS
HEIGHT: 67 IN | TEMPERATURE: 98.3 F | RESPIRATION RATE: 20 BRPM | BODY MASS INDEX: 32.02 KG/M2 | OXYGEN SATURATION: 98 % | WEIGHT: 204 LBS | SYSTOLIC BLOOD PRESSURE: 116 MMHG | HEART RATE: 96 BPM | DIASTOLIC BLOOD PRESSURE: 79 MMHG

## 2018-11-07 DIAGNOSIS — Z13.220 SCREENING CHOLESTEROL LEVEL: ICD-10-CM

## 2018-11-07 DIAGNOSIS — D64.9 ANEMIA, NORMOCYTIC NORMOCHROMIC: ICD-10-CM

## 2018-11-07 DIAGNOSIS — Z13.29 SCREENING FOR THYROID DISORDER: ICD-10-CM

## 2018-11-07 DIAGNOSIS — R35.0 FREQUENCY OF URINATION: ICD-10-CM

## 2018-11-07 DIAGNOSIS — R73.02 IGT (IMPAIRED GLUCOSE TOLERANCE): ICD-10-CM

## 2018-11-07 DIAGNOSIS — E55.9 VITAMIN D DEFICIENCY: ICD-10-CM

## 2018-11-07 DIAGNOSIS — Z00.00 ENCOUNTER FOR ANNUAL PHYSICAL EXAM: Primary | ICD-10-CM

## 2018-11-07 PROBLEM — Z34.90 PREGNANCY: Status: RESOLVED | Noted: 2018-04-13 | Resolved: 2018-11-07

## 2018-11-07 NOTE — LETTER
11/16/2018 8:14 AM 
 
Ms. Anjali Pascual [facility] [phone] Dear Anjali Soto: 
 
Please find your most recent results below. Except for low serum vit D level, results look good Resulted Orders METABOLIC PANEL, COMPREHENSIVE Result Value Ref Range Glucose 85 65 - 99 mg/dL BUN 7 6 - 20 mg/dL Creatinine 0.89 0.57 - 1.00 mg/dL GFR est non-AA 88 >59 mL/min/1.73 GFR est  >59 mL/min/1.73  
 BUN/Creatinine ratio 8 (L) 9 - 23 Sodium 142 134 - 144 mmol/L Potassium 4.3 3.5 - 5.2 mmol/L Chloride 105 96 - 106 mmol/L  
 CO2 25 20 - 29 mmol/L Calcium 9.1 8.7 - 10.2 mg/dL Protein, total 6.6 6.0 - 8.5 g/dL Albumin 4.4 3.5 - 5.5 g/dL GLOBULIN, TOTAL 2.2 1.5 - 4.5 g/dL A-G Ratio 2.0 1.2 - 2.2 Bilirubin, total 0.8 0.0 - 1.2 mg/dL Alk. phosphatase 79 39 - 117 IU/L  
 AST (SGOT) 18 0 - 40 IU/L  
 ALT (SGPT) 11 0 - 32 IU/L Narrative Performed at:  [address]  [id number] : Lucas Nicole MD, Phone:  [phone] CBC WITH AUTOMATED DIFF Result Value Ref Range WBC 6.8 3.4 - 10.8 x10E3/uL  
 RBC 4.66 3.77 - 5.28 x10E6/uL HGB 14.4 11.1 - 15.9 g/dL HCT 42.5 34.0 - 46.6 % MCV 91 79 - 97 fL  
 MCH 30.9 26.6 - 33.0 pg  
 MCHC 33.9 31.5 - 35.7 g/dL  
 RDW 13.0 12.3 - 15.4 % PLATELET 881 911 - 076 x10E3/uL NEUTROPHILS 64 Not Estab. % Lymphocytes 28 Not Estab. % MONOCYTES 7 Not Estab. % EOSINOPHILS 1 Not Estab. % BASOPHILS 0 Not Estab. %  
 ABS. NEUTROPHILS 4.3 1.4 - 7.0 x10E3/uL Abs Lymphocytes 1.9 0.7 - 3.1 x10E3/uL  
 ABS. MONOCYTES 0.5 0.1 - 0.9 x10E3/uL  
 ABS. EOSINOPHILS 0.1 0.0 - 0.4 x10E3/uL  
 ABS. BASOPHILS 0.0 0.0 - 0.2 x10E3/uL IMMATURE GRANULOCYTES 0 Not Estab. %  
 ABS. IMM. GRANS. 0.0 0.0 - 0.1 x10E3/uL Narrative Performed at:  [address]  [id number] : Aba Christy MD, Phone:  [phone] LIPID PANEL Result Value Ref Range Cholesterol, total 154 100 - 199 mg/dL Triglyceride 64 0 - 149 mg/dL HDL Cholesterol 46 >39 mg/dL VLDL, calculated 13 5 - 40 mg/dL LDL, calculated 95 0 - 99 mg/dL Narrative Performed at:  [address]  [id number] : Aba Christy MD, Phone:  [phone] HEMOGLOBIN A1C WITH EAG Result Value Ref Range Hemoglobin A1c 5.0 4.8 - 5.6 % Comment:  
            Prediabetes: 5.7 - 6.4 Diabetes: >6.4 Glycemic control for adults with diabetes: <7.0 Estimated average glucose 97 mg/dL Narrative Performed at:  [address]  [id number] : Aba Christy MD, Phone:  [phone] TSH 3RD GENERATION Result Value Ref Range TSH 1.740 0.450 - 4.500 uIU/mL Narrative Performed at:  [address]  [id number] : Aba Christy MD, Phone:  [phone] VITAMIN D, 25 HYDROXY Result Value Ref Range VITAMIN D, 25-HYDROXY 14.7 (L) 30.0 - 100.0 ng/mL Comment:  
   Vitamin D deficiency has been defined by the [address] [place] [address] practice guideline as a 
level of serum 25-OH vitamin D less than 20 ng/mL (1,2). The Endocrine Society went on to further define vitamin D 
insufficiency as a level between 21 and 29 ng/mL (2). 1. IOM ([place] of Medicine). 2010. Dietary reference 
   intakes for calcium and D. [facility]: The 
   [employer]. 2. Maru MF, Rima NC, Lisa COURTNEY, et al. 
   Evaluation, treatment, and prevention of vitamin D 
   deficiency: an Endocrine Society clinical practice 
   guideline. JCEM. 2011 Jul; 96(7):1911-30. Narrative Performed at:  [address]  [id number] : Viji Mcmillan MD, Phone:  [phone] URINALYSIS W/ RFLX MICROSCOPIC Result Value Ref Range Specific Gravity 1.007 1.005 - 1.030  
 pH (UA) 6.5 5.0 - 7.5 Color Yellow Yellow Appearance Clear Clear Leukocyte Esterase Negative Negative Protein Negative Negative/Trace Glucose Negative Negative Ketone Negative Negative Blood Negative Negative Bilirubin Negative Negative Urobilinogen 0.2 0.2 - 1.0 mg/dL Nitrites Negative Negative Microscopic Examination Comment Comment:  
   Microscopic not indicated and not performed. Narrative Performed at:  [address]  [id number] : Viji Mcmillan MD, Phone:  [phone] RECOMMENDATIONS: 
None. Keep up the good work! Make sure you are taking 500mg of Calcium with Vitamin D twice a day. Please fill the enclosed prescription vit d Please call me if you have any questions: [phone] Sincerely, Katlin Breaux MD

## 2018-11-07 NOTE — PATIENT INSTRUCTIONS

## 2018-11-07 NOTE — PROGRESS NOTES
Chief Complaint   Patient presents with    New Patient     establish care       Subjective:  Nico Cruz is a 29 y.o. AA female presents to establish care for annual medical exam.   Her gyne and breast care is done elsewhere by her Ob-Gyne physician. Allergies   Allergen Reactions    Depo-Provera [Medroxyprogesterone] Hives    Reclipsen (28) [Desogestrel-Ethinyl Estradiol] Hives    Sulfa (Sulfonamide Antibiotics) Hives     As child    Pcn [Penicillins] Hives     Past Medical History:   Diagnosis Date    Anemia     Chlamydia 1/8/2014    ESCOBAR II (cervical intraepithelial neoplasia II)     Dysplasia of cervix, low grade (ESCOBAR 1)     Encounter for insertion of mirena IUD 05/25/2018    Mirena placed    Headache      Past Surgical History:   Procedure Laterality Date    HX ACL RECONSTRUCTION  1/2008    HX COLPOSCOPY      4/28/10- ecto ESCOBAR 2----4/5/12 ESCOBAR 1---7/18/13-negative    HX LAP CHOLECYSTECTOMY  10/04/2016    Laparoscopic cholecystectomy by Dr. Anli Landa.     HX ORTHOPAEDIC      left ACL surgery    HX OTHER SURGICAL      HX TONSILLECTOMY       Family History   Adopted: Yes   Problem Relation Age of Onset    No Known Problems Mother     No Known Problems Father     Diabetes Maternal Uncle     Hypertension Maternal Grandmother     Gall Bladder Disease Maternal Grandmother      Social History     Tobacco Use    Smoking status: Former Smoker    Smokeless tobacco: Never Used   Substance Use Topics    Alcohol use: Yes     Comment: social        Lab Results   Component Value Date/Time    WBC 10.3 04/13/2018 06:50 AM    HGB 11.5 04/13/2018 06:50 AM    Hemoglobin (POC) 13.6 10/04/2016 08:20 AM    HCT 34.6 (L) 04/13/2018 06:50 AM    PLATELET 829 50/51/2264 06:50 AM    MCV 95.3 04/13/2018 06:50 AM    Hgb, External 10.6 01/24/2018    Hct, External 32.6 01/24/2018    Platelet cnt., External 210 01/24/2018       Lab Results   Component Value Date/Time    Sodium 142 02/04/2014 04:09 PM    Potassium 4.2 02/04/2014 04:09 PM    Chloride 105 02/04/2014 04:09 PM    CO2 19 02/04/2014 04:09 PM    Anion gap 8 11/26/2011 11:20 AM    Glucose 86 02/04/2014 04:09 PM    BUN 9 02/04/2014 04:09 PM    Creatinine 0.94 02/04/2014 04:09 PM    BUN/Creatinine ratio 10 02/04/2014 04:09 PM    GFR est AA 99 02/04/2014 04:09 PM    GFR est non-AA 86 02/04/2014 04:09 PM    Calcium 9.1 02/04/2014 04:09 PM    Bilirubin, total 0.4 02/04/2014 04:09 PM    ALT (SGPT) 11 02/04/2014 04:09 PM    AST (SGOT) 19 02/04/2014 04:09 PM    Alk. phosphatase 49 02/04/2014 04:09 PM    Protein, total 6.8 02/04/2014 04:09 PM    Albumin 4.4 02/04/2014 04:09 PM    Globulin 3.0 11/26/2011 11:20 AM    A-G Ratio 1.8 02/04/2014 04:09 PM         ROS:  Feeling generally fatigue  No unusual headaches, no dysphagia  No prolonged cough. No dyspnea or chest pain on exertion  No abdominal pain, change in bowel habits, black or bloody stools  No urinary tract symptoms  No new or unusual musculoskeletal symptoms. Specific concerns today: persistent fatigue since her child 6 months ago    Objective:  Blood pressure 116/79, pulse 96, temperature 98.3 °F (36.8 °C), temperature source Oral, resp. rate 20, height 5' 7\" (1.702 m), weight 204 lb (92.5 kg), last menstrual period 09/19/2018, SpO2 98 %, not currently breastfeeding. Patient appears well, alert, oriented x 3, in no distress. ENT:  Neck supple. No adenopathy or thyromegaly, PERRL. Lungs clear, good air entry, no wheezes, rhonchi or rales  CVS:  no murmurs, regular rate and rhythm  Abdomen soft without tenderness, guarding or organomegaly  Ext: no edema, normal peripheral pulses  Neurological is normal, no focal findings. Breast and Pelvic exams are deferred.     Assessment/Plan:  Diagnoses and all orders for this visit:    Encounter for annual physical exam  -     METABOLIC PANEL, COMPREHENSIVE  -     CBC WITH AUTOMATED DIFF    Anemia, normocytic normochromic  -     CBC WITH AUTOMATED DIFF    Screening for thyroid disorder  -     TSH 3RD GENERATION    Screening cholesterol level  -     LIPID PANEL    IGT (impaired glucose tolerance)  -     HEMOGLOBIN A1C WITH EAG    Vitamin D deficiency  -     VITAMIN D, 25 HYDROXY    Frequency of urination  -     URINALYSIS W/ RFLX MICROSCOPIC      Patient Instructions        Well Visit, Ages 25 to 48: Care Instructions  Your Care Instructions    Physical exams can help you stay healthy. Your doctor has checked your overall health and may have suggested ways to take good care of yourself. He or she also may have recommended tests. At home, you can help prevent illness with healthy eating, regular exercise, and other steps. Follow-up care is a key part of your treatment and safety. Be sure to make and go to all appointments, and call your doctor if you are having problems. It's also a good idea to know your test results and keep a list of the medicines you take. How can you care for yourself at home? · Reach and stay at a healthy weight. This will lower your risk for many problems, such as obesity, diabetes, heart disease, and high blood pressure. · Get at least 30 minutes of physical activity on most days of the week. Walking is a good choice. You also may want to do other activities, such as running, swimming, cycling, or playing tennis or team sports. Discuss any changes in your exercise program with your doctor. · Do not smoke or allow others to smoke around you. If you need help quitting, talk to your doctor about stop-smoking programs and medicines. These can increase your chances of quitting for good. · Talk to your doctor about whether you have any risk factors for sexually transmitted infections (STIs). Having one sex partner (who does not have STIs and does not have sex with anyone else) is a good way to avoid these infections. · Use birth control if you do not want to have children at this time.  Talk with your doctor about the choices available and what might be best for you.  · Protect your skin from too much sun. When you're outdoors from 10 a.m. to 4 p.m., stay in the shade or cover up with clothing and a hat with a wide brim. Wear sunglasses that block UV rays. Even when it's cloudy, put broad-spectrum sunscreen (SPF 30 or higher) on any exposed skin. · See a dentist one or two times a year for checkups and to have your teeth cleaned. · Wear a seat belt in the car. · Drink alcohol in moderation, if at all. That means no more than 2 drinks a day for men and 1 drink a day for women. Follow your doctor's advice about when to have certain tests. These tests can spot problems early. For everyone  · Cholesterol. Have the fat (cholesterol) in your blood tested after age 21. Your doctor will tell you how often to have this done based on your age, family history, or other things that can increase your risk for heart disease. · Blood pressure. Have your blood pressure checked during a routine doctor visit. Your doctor will tell you how often to check your blood pressure based on your age, your blood pressure results, and other factors. · Vision. Talk with your doctor about how often to have a glaucoma test.  · Diabetes. Ask your doctor whether you should have tests for diabetes. · Colon cancer. Have a test for colon cancer at age 48. You may have one of several tests. If you are younger than 48, you may need a test earlier if you have any risk factors. Risk factors include whether you already had a precancerous polyp removed from your colon or whether your parent, brother, sister, or child has had colon cancer. For women  · Breast exam and mammogram. Talk to your doctor about when you should have a clinical breast exam and a mammogram. Medical experts differ on whether and how often women under 50 should have these tests. Your doctor can help you decide what is right for you. · Pap test and pelvic exam. Begin Pap tests at age 24.  A Pap test is the best way to find cervical cancer. The test often is part of a pelvic exam. Ask how often to have this test.  · Tests for sexually transmitted infections (STIs). Ask whether you should have tests for STIs. You may be at risk if you have sex with more than one person, especially if your partners do not wear condoms. For men  · Tests for sexually transmitted infections (STIs). Ask whether you should have tests for STIs. You may be at risk if you have sex with more than one person, especially if you do not wear a condom. · Testicular cancer exam. Ask your doctor whether you should check your testicles regularly. · Prostate exam. Talk to your doctor about whether you should have a blood test (called a PSA test) for prostate cancer. Experts differ on whether and when men should have this test. Some experts suggest it if you are older than 39 and are -American or have a father or brother who got prostate cancer when he was younger than 72. When should you call for help? Watch closely for changes in your health, and be sure to contact your doctor if you have any problems or symptoms that concern you. Where can you learn more? Go to http://ama-alyson.info/. Enter P072 in the search box to learn more about \"Well Visit, Ages 25 to 48: Care Instructions. \"  Current as of: March 29, 2018  Content Version: 11.8  © 6473-4516 [employer], Incorporated. Care instructions adapted under license by [employer] (which disclaims liability or warranty for this information). If you have questions about a medical condition or this instruction, always ask your healthcare professional. [place] [address] any warranty or liability for your use of this information. Follow-up Disposition:  Return 1-2 weeks, for f/u result.

## 2018-11-08 LAB
25(OH)D3+25(OH)D2 SERPL-MCNC: 14.7 NG/ML (ref 30–100)
ALBUMIN SERPL-MCNC: 4.4 G/DL (ref 3.5–5.5)
ALBUMIN/GLOB SERPL: 2 {RATIO} (ref 1.2–2.2)
ALP SERPL-CCNC: 79 IU/L (ref 39–117)
ALT SERPL-CCNC: 11 IU/L (ref 0–32)
APPEARANCE UR: CLEAR
AST SERPL-CCNC: 18 IU/L (ref 0–40)
BASOPHILS # BLD AUTO: 0 X10E3/UL (ref 0–0.2)
BASOPHILS NFR BLD AUTO: 0 %
BILIRUB SERPL-MCNC: 0.8 MG/DL (ref 0–1.2)
BILIRUB UR QL STRIP: NEGATIVE
BUN SERPL-MCNC: 7 MG/DL (ref 6–20)
BUN/CREAT SERPL: 8 (ref 9–23)
CALCIUM SERPL-MCNC: 9.1 MG/DL (ref 8.7–10.2)
CHLORIDE SERPL-SCNC: 105 MMOL/L (ref 96–106)
CHOLEST SERPL-MCNC: 154 MG/DL (ref 100–199)
CO2 SERPL-SCNC: 25 MMOL/L (ref 20–29)
COLOR UR: YELLOW
CREAT SERPL-MCNC: 0.89 MG/DL (ref 0.57–1)
EOSINOPHIL # BLD AUTO: 0.1 X10E3/UL (ref 0–0.4)
EOSINOPHIL NFR BLD AUTO: 1 %
ERYTHROCYTE [DISTWIDTH] IN BLOOD BY AUTOMATED COUNT: 13 % (ref 12.3–15.4)
EST. AVERAGE GLUCOSE BLD GHB EST-MCNC: 97 MG/DL
GLOBULIN SER CALC-MCNC: 2.2 G/DL (ref 1.5–4.5)
GLUCOSE SERPL-MCNC: 85 MG/DL (ref 65–99)
GLUCOSE UR QL: NEGATIVE
HBA1C MFR BLD: 5 % (ref 4.8–5.6)
HCT VFR BLD AUTO: 42.5 % (ref 34–46.6)
HDLC SERPL-MCNC: 46 MG/DL
HGB BLD-MCNC: 14.4 G/DL (ref 11.1–15.9)
HGB UR QL STRIP: NEGATIVE
IMM GRANULOCYTES # BLD: 0 X10E3/UL (ref 0–0.1)
IMM GRANULOCYTES NFR BLD: 0 %
KETONES UR QL STRIP: NEGATIVE
LDLC SERPL CALC-MCNC: 95 MG/DL (ref 0–99)
LEUKOCYTE ESTERASE UR QL STRIP: NEGATIVE
LYMPHOCYTES # BLD AUTO: 1.9 X10E3/UL (ref 0.7–3.1)
LYMPHOCYTES NFR BLD AUTO: 28 %
MCH RBC QN AUTO: 30.9 PG (ref 26.6–33)
MCHC RBC AUTO-ENTMCNC: 33.9 G/DL (ref 31.5–35.7)
MCV RBC AUTO: 91 FL (ref 79–97)
MICRO URNS: NORMAL
MONOCYTES # BLD AUTO: 0.5 X10E3/UL (ref 0.1–0.9)
MONOCYTES NFR BLD AUTO: 7 %
NEUTROPHILS # BLD AUTO: 4.3 X10E3/UL (ref 1.4–7)
NEUTROPHILS NFR BLD AUTO: 64 %
NITRITE UR QL STRIP: NEGATIVE
PH UR STRIP: 6.5 [PH] (ref 5–7.5)
PLATELET # BLD AUTO: 262 X10E3/UL (ref 150–379)
POTASSIUM SERPL-SCNC: 4.3 MMOL/L (ref 3.5–5.2)
PROT SERPL-MCNC: 6.6 G/DL (ref 6–8.5)
PROT UR QL STRIP: NEGATIVE
RBC # BLD AUTO: 4.66 X10E6/UL (ref 3.77–5.28)
SODIUM SERPL-SCNC: 142 MMOL/L (ref 134–144)
SP GR UR: 1.01 (ref 1–1.03)
TRIGL SERPL-MCNC: 64 MG/DL (ref 0–149)
TSH SERPL DL<=0.005 MIU/L-ACNC: 1.74 UIU/ML (ref 0.45–4.5)
UROBILINOGEN UR STRIP-MCNC: 0.2 MG/DL (ref 0.2–1)
VLDLC SERPL CALC-MCNC: 13 MG/DL (ref 5–40)
WBC # BLD AUTO: 6.8 X10E3/UL (ref 3.4–10.8)

## 2018-11-16 ENCOUNTER — OFFICE VISIT (OUTPATIENT)
Dept: FAMILY MEDICINE CLINIC | Age: 28
End: 2018-11-16

## 2018-11-16 VITALS
HEIGHT: 67 IN | RESPIRATION RATE: 20 BRPM | OXYGEN SATURATION: 98 % | SYSTOLIC BLOOD PRESSURE: 120 MMHG | HEART RATE: 94 BPM | TEMPERATURE: 98.5 F | DIASTOLIC BLOOD PRESSURE: 84 MMHG | WEIGHT: 204 LBS | BODY MASS INDEX: 32.02 KG/M2

## 2018-11-16 DIAGNOSIS — E55.9 VITAMIN D DEFICIENCY: Primary | ICD-10-CM

## 2018-11-16 RX ORDER — CHOLECALCIFEROL TAB 125 MCG (5000 UNIT) 125 MCG
5000 TAB ORAL DAILY
Qty: 90 TAB | Refills: 1 | Status: SHIPPED | OUTPATIENT
Start: 2018-11-16 | End: 2020-05-20

## 2018-11-16 RX ORDER — CHOLECALCIFEROL TAB 125 MCG (5000 UNIT) 125 MCG
5000 TAB ORAL DAILY
Qty: 90 TAB | Refills: 1 | Status: SHIPPED | OUTPATIENT
Start: 2018-11-16 | End: 2018-11-16 | Stop reason: SDUPTHER

## 2018-11-16 NOTE — PROGRESS NOTES
Chief Complaint Patient presents with  Results HPI: 
Malaika Conde is a 29 y.o. AA female presents results. Except for low serum vit D level, results look good. Advised to start vit d supplement. Diet and exercise have encouraged. Review of Systems As per hpi Past Medical History:  
Diagnosis Date  Anemia  Chlamydia 1/8/2014  ESCOBAR II (cervical intraepithelial neoplasia II)  Dysplasia of cervix, low grade (ESCOBAR 1)  Encounter for insertion of mirena IUD 05/25/2018 Mirena placed  Headache Past Surgical History:  
Procedure Laterality Date  HX ACL RECONSTRUCTION  1/2008  HX COLPOSCOPY    
 4/28/10- ecto ESCOBAR 2----4/5/12 ESCOBAR 1---7/18/13-negative  HX LAP CHOLECYSTECTOMY  10/04/2016 Laparoscopic cholecystectomy by Dr. Ousmane Juan.  HX ORTHOPAEDIC    
 left ACL surgery  HX OTHER SURGICAL    
 HX TONSILLECTOMY Social History Socioeconomic History  Marital status: SINGLE Spouse name: Not on file  Number of children: Not on file  Years of education: Not on file  Highest education level: Not on file Social Needs  Financial resource strain: Not on file  Food insecurity - worry: Not on file  Food insecurity - inability: Not on file  Transportation needs - medical: Not on file  Transportation needs - non-medical: Not on file Occupational History  Not on file Tobacco Use  Smoking status: Former Smoker  Smokeless tobacco: Never Used Substance and Sexual Activity  Alcohol use: Yes Comment: social  
 Drug use: No  
 Sexual activity: Yes  
  Partners: Male Birth control/protection: None, IUD Other Topics Concern  Not on file Social History Narrative  Not on file Family History Adopted: Yes  
Problem Relation Age of Onset  No Known Problems Mother  No Known Problems Father  Diabetes Maternal Uncle  Hypertension Maternal Grandmother  Gall Bladder Disease Maternal Grandmother Allergies Allergen Reactions  Depo-Provera [Medroxyprogesterone] Hives  Reclipsen (28) [Desogestrel-Ethinyl Estradiol] Hives  Sulfa (Sulfonamide Antibiotics) Hives As child  Pcn [Penicillins] Hives Objective: 
Visit Vitals /84 Pulse 94 Temp 98.5 °F (36.9 °C) (Oral) Resp 20 Ht 5' 7\" (1.702 m) Wt 204 lb (92.5 kg) SpO2 98% BMI 31.95 kg/m² Physical Exam:  
General appearance - alert,oriented x 3, well appearing in no distress EYE-PERRL, EOMI Neck - supple, no significant adenopathy Chest - clear to auscultation, no wheezes, rales or rhonchi Heart - normal rate, regular rhythm, normal blood pressure Abdomen - soft, nontender, nondistended, no organomegaly Ext-peripheral pulses normal, no pedal edema Neuro -alert, oriented, normal speech, no focal findings Back-full range of motion, no tenderness, palpable spasm or pain on motion Results for orders placed or performed in visit on 11/07/18 METABOLIC PANEL, COMPREHENSIVE Result Value Ref Range Glucose 85 65 - 99 mg/dL BUN 7 6 - 20 mg/dL Creatinine 0.89 0.57 - 1.00 mg/dL GFR est non-AA 88 >59 mL/min/1.73 GFR est  >59 mL/min/1.73  
 BUN/Creatinine ratio 8 (L) 9 - 23 Sodium 142 134 - 144 mmol/L Potassium 4.3 3.5 - 5.2 mmol/L Chloride 105 96 - 106 mmol/L  
 CO2 25 20 - 29 mmol/L Calcium 9.1 8.7 - 10.2 mg/dL Protein, total 6.6 6.0 - 8.5 g/dL Albumin 4.4 3.5 - 5.5 g/dL GLOBULIN, TOTAL 2.2 1.5 - 4.5 g/dL A-G Ratio 2.0 1.2 - 2.2 Bilirubin, total 0.8 0.0 - 1.2 mg/dL Alk. phosphatase 79 39 - 117 IU/L  
 AST (SGOT) 18 0 - 40 IU/L  
 ALT (SGPT) 11 0 - 32 IU/L  
CBC WITH AUTOMATED DIFF Result Value Ref Range WBC 6.8 3.4 - 10.8 x10E3/uL  
 RBC 4.66 3.77 - 5.28 x10E6/uL HGB 14.4 11.1 - 15.9 g/dL HCT 42.5 34.0 - 46.6 % MCV 91 79 - 97 fL  
 MCH 30.9 26.6 - 33.0 pg  
 MCHC 33.9 31.5 - 35.7 g/dL RDW 13.0 12.3 - 15.4 % PLATELET 413 737 - 103 x10E3/uL NEUTROPHILS 64 Not Estab. % Lymphocytes 28 Not Estab. % MONOCYTES 7 Not Estab. % EOSINOPHILS 1 Not Estab. % BASOPHILS 0 Not Estab. %  
 ABS. NEUTROPHILS 4.3 1.4 - 7.0 x10E3/uL Abs Lymphocytes 1.9 0.7 - 3.1 x10E3/uL  
 ABS. MONOCYTES 0.5 0.1 - 0.9 x10E3/uL  
 ABS. EOSINOPHILS 0.1 0.0 - 0.4 x10E3/uL  
 ABS. BASOPHILS 0.0 0.0 - 0.2 x10E3/uL IMMATURE GRANULOCYTES 0 Not Estab. %  
 ABS. IMM. GRANS. 0.0 0.0 - 0.1 x10E3/uL LIPID PANEL Result Value Ref Range Cholesterol, total 154 100 - 199 mg/dL Triglyceride 64 0 - 149 mg/dL HDL Cholesterol 46 >39 mg/dL VLDL, calculated 13 5 - 40 mg/dL LDL, calculated 95 0 - 99 mg/dL HEMOGLOBIN A1C WITH EAG Result Value Ref Range Hemoglobin A1c 5.0 4.8 - 5.6 % Estimated average glucose 97 mg/dL TSH 3RD GENERATION Result Value Ref Range TSH 1.740 0.450 - 4.500 uIU/mL VITAMIN D, 25 HYDROXY Result Value Ref Range VITAMIN D, 25-HYDROXY 14.7 (L) 30.0 - 100.0 ng/mL URINALYSIS W/ RFLX MICROSCOPIC Result Value Ref Range Specific Gravity 1.007 1.005 - 1.030  
 pH (UA) 6.5 5.0 - 7.5 Color Yellow Yellow Appearance Clear Clear Leukocyte Esterase Negative Negative Protein Negative Negative/Trace Glucose Negative Negative Ketone Negative Negative Blood Negative Negative Bilirubin Negative Negative Urobilinogen 0.2 0.2 - 1.0 mg/dL Nitrites Negative Negative Microscopic Examination Comment Assessment/Plan: 
Diagnoses and all orders for this visit: 
 
Vitamin D deficiency -     Discontinue: cholecalciferol, VITAMIN D3, (VITAMIN D3) 5,000 unit tab tablet; Take 1 Tab by mouth daily. , Normal, Disp-90 Tab, R-1 
-     cholecalciferol, VITAMIN D3, (VITAMIN D3) 5,000 unit tab tablet; Take 1 Tab by mouth daily. , Print, Disp-90 Tab, R-1 Patient Instructions Learning About Vitamin D Why is it important to get enough vitamin D? Your body needs vitamin D to absorb calcium. Calcium keeps your bones and muscles, including your heart, healthy and strong. If your muscles don't get enough calcium, they can cramp, hurt, or feel weak. You may have long-term (chronic) muscle aches and pains. If you don't get enough vitamin D throughout life, you have an increased chance of having thin and brittle bones (osteoporosis) in your later years. Children who don't get enough vitamin D may not grow as much as others their age. They also have a chance of getting a rare disease called rickets. It causes weak bones. Vitamin D and calcium are added to many foods. And your body uses sunshine to make its own vitamin D. How much vitamin D do you need? The [place] of Medicine recommends that people ages 3 through 79 get 600 IU (international units) every day. Adults 71 and older need 800 IU every day. Blood tests for vitamin D can check your vitamin D level. But there is no standard normal range used by all laboratories. You're likely getting enough vitamin D if your levels are in the range of 20 to 50 ng/mL. How can you get more vitamin D? Foods that contain vitamin D include: 
· [place], tuna, and mackerel. These are some of the best foods to eat when you need to get more vitamin D. 
· Cheese, egg yolks, and beef liver. These foods have vitamin D in small amounts. · Milk, soy drinks, orange juice, yogurt, margarine, and some kinds of cereal have vitamin D added to them. Some people don't make vitamin D as well as others. They may have to take extra care in getting enough vitamin D. Things that reduce how much vitamin D your body makes include: · Dark skin, such as many  Americans have. · Age, especially if you are older than 72. · Digestive problems, such as Crohn's or celiac disease. · Liver and kidney disease. Some people who do not get enough vitamin D may need supplements. Are there any risks from taking vitamin D? · Too much vitamin D: 
? Can damage your kidneys. ? Can cause nausea and vomiting, constipation, and weakness. ? Raises the amount of calcium in your blood. If this happens, you can get confused or have an irregular heart rhythm. · Vitamin D may interact with other medicines. Tell your doctor about all of the medicines you take, including over-the-counter drugs, herbs, and pills. Tell your doctor about all of your current medical problems. Where can you learn more? Go to http://ama-alyson.info/. Enter [id number] in the search box to learn more about \"Learning About Vitamin D.\" 
Current as of: March 29, 2018 Content Version: 11.8 © 8121-9548 [employer]. Care instructions adapted under license by [employer]® (which disclaims liability or warranty for this information). If you have questions about a medical condition or this instruction, always ask your healthcare professional. [address] any warranty or liability for your use of this information. Follow-up Disposition: 
Return 4-6 month, for f/u vit d check.

## 2018-11-16 NOTE — PATIENT INSTRUCTIONS
Learning About Vitamin D Why is it important to get enough vitamin D? Your body needs vitamin D to absorb calcium. Calcium keeps your bones and muscles, including your heart, healthy and strong. If your muscles don't get enough calcium, they can cramp, hurt, or feel weak. You may have long-term (chronic) muscle aches and pains. If you don't get enough vitamin D throughout life, you have an increased chance of having thin and brittle bones (osteoporosis) in your later years. Children who don't get enough vitamin D may not grow as much as others their age. They also have a chance of getting a rare disease called rickets. It causes weak bones. Vitamin D and calcium are added to many foods. And your body uses sunshine to make its own vitamin D. How much vitamin D do you need? The [place] of Medicine recommends that people ages 3 through 79 get 600 IU (international units) every day. Adults 71 and older need 800 IU every day. Blood tests for vitamin D can check your vitamin D level. But there is no standard normal range used by all laboratories. You're likely getting enough vitamin D if your levels are in the range of 20 to 50 ng/mL. How can you get more vitamin D? Foods that contain vitamin D include: 
· [place], tuna, and mackerel. These are some of the best foods to eat when you need to get more vitamin D. 
· Cheese, egg yolks, and beef liver. These foods have vitamin D in small amounts. · Milk, soy drinks, orange juice, yogurt, margarine, and some kinds of cereal have vitamin D added to them. Some people don't make vitamin D as well as others. They may have to take extra care in getting enough vitamin D. Things that reduce how much vitamin D your body makes include: · Dark skin, such as many  Americans have. · Age, especially if you are older than 72. · Digestive problems, such as Crohn's or celiac disease. · Liver and kidney disease. Some people who do not get enough vitamin D may need supplements. Are there any risks from taking vitamin D? 
· Too much vitamin D: 
? Can damage your kidneys. ? Can cause nausea and vomiting, constipation, and weakness. ? Raises the amount of calcium in your blood. If this happens, you can get confused or have an irregular heart rhythm. · Vitamin D may interact with other medicines. Tell your doctor about all of the medicines you take, including over-the-counter drugs, herbs, and pills. Tell your doctor about all of your current medical problems. Where can you learn more? Go to http://amaMatchbinalyson.info/. Enter [id number] in the search box to learn more about \"Learning About Vitamin D.\" 
Current as of: March 29, 2018 Content Version: 11.8 © 3530-2969 [employer]. Care instructions adapted under license by [employer] (which disclaims liability or warranty for this information). If you have questions about a medical condition or this instruction, always ask your healthcare professional. [address] any warranty or liability for your use of this information.

## 2018-11-18 RX ORDER — CHOLECALCIFEROL TAB 125 MCG (5000 UNIT) 125 MCG
5000 TAB ORAL DAILY
Qty: 90 TAB | Refills: 1 | Status: SHIPPED | OUTPATIENT
Start: 2018-11-18 | End: 2020-11-18 | Stop reason: SDUPTHER

## 2019-12-02 ENCOUNTER — HOSPITAL ENCOUNTER (OUTPATIENT)
Age: 29
Discharge: HOME | End: 2019-12-02
Attending: ORTHOPAEDIC SURGERY | Admitting: ORTHOPAEDIC SURGERY
Payer: MEDICAID

## 2019-12-02 ENCOUNTER — ANESTHESIA EVENT (OUTPATIENT)
Dept: SURGERY | Age: 29
End: 2019-12-02
Payer: MEDICAID

## 2019-12-02 ENCOUNTER — ANESTHESIA (OUTPATIENT)
Dept: SURGERY | Age: 29
End: 2019-12-02
Payer: MEDICAID

## 2019-12-02 VITALS
BODY MASS INDEX: 27.89 KG/M2 | WEIGHT: 184 LBS | RESPIRATION RATE: 14 BRPM | SYSTOLIC BLOOD PRESSURE: 118 MMHG | OXYGEN SATURATION: 99 % | TEMPERATURE: 98 F | DIASTOLIC BLOOD PRESSURE: 68 MMHG | HEART RATE: 91 BPM | HEIGHT: 68 IN

## 2019-12-02 PROBLEM — S83.519A ACL (ANTERIOR CRUCIATE LIGAMENT) RUPTURE: Status: ACTIVE | Noted: 2019-12-02

## 2019-12-02 LAB — HCG UR QL: NEGATIVE

## 2019-12-02 PROCEDURE — C1713 ANCHOR/SCREW BN/BN,TIS/BN: HCPCS | Performed by: ORTHOPAEDIC SURGERY

## 2019-12-02 PROCEDURE — 74011250636 HC RX REV CODE- 250/636: Performed by: ANESTHESIOLOGY

## 2019-12-02 PROCEDURE — 76210000021 HC REC RM PH II 0.5 TO 1 HR: Performed by: ORTHOPAEDIC SURGERY

## 2019-12-02 PROCEDURE — 81025 URINE PREGNANCY TEST: CPT

## 2019-12-02 PROCEDURE — 77030000032 HC CUF TRNQT ZIMM -B: Performed by: ORTHOPAEDIC SURGERY

## 2019-12-02 PROCEDURE — 77030019935 HC TBNG IRR ARTHSCP BAXT -A: Performed by: ORTHOPAEDIC SURGERY

## 2019-12-02 PROCEDURE — 77030026438 HC STYL ET INTUB CARD -A: Performed by: NURSE ANESTHETIST, CERTIFIED REGISTERED

## 2019-12-02 PROCEDURE — 74011250636 HC RX REV CODE- 250/636: Performed by: NURSE ANESTHETIST, CERTIFIED REGISTERED

## 2019-12-02 PROCEDURE — 77030008684 HC TU ET CUF COVD -B: Performed by: NURSE ANESTHETIST, CERTIFIED REGISTERED

## 2019-12-02 PROCEDURE — 74011000250 HC RX REV CODE- 250: Performed by: NURSE ANESTHETIST, CERTIFIED REGISTERED

## 2019-12-02 PROCEDURE — 77030012893: Performed by: ORTHOPAEDIC SURGERY

## 2019-12-02 PROCEDURE — 77030040922 HC BLNKT HYPOTHRM STRY -A

## 2019-12-02 PROCEDURE — 77030003601 HC NDL NRV BLK BBMI -A

## 2019-12-02 PROCEDURE — 77030018835 HC SOL IRR LR ICUM -A: Performed by: ORTHOPAEDIC SURGERY

## 2019-12-02 PROCEDURE — 77030006884 HC BLD SHV INCIS S&N -B: Performed by: ORTHOPAEDIC SURGERY

## 2019-12-02 PROCEDURE — 74011250636 HC RX REV CODE- 250/636: Performed by: ORTHOPAEDIC SURGERY

## 2019-12-02 PROCEDURE — 77030020274 HC MISC IMPL ORTHOPEDIC: Performed by: ORTHOPAEDIC SURGERY

## 2019-12-02 PROCEDURE — 76010000172 HC OR TIME 2.5 TO 3 HR INTENSV-TIER 1: Performed by: ORTHOPAEDIC SURGERY

## 2019-12-02 PROCEDURE — 77030037751 HC GD CUT BLD DISP ARTH -C: Performed by: ORTHOPAEDIC SURGERY

## 2019-12-02 PROCEDURE — 76210000017 HC OR PH I REC 1.5 TO 2 HR: Performed by: ORTHOPAEDIC SURGERY

## 2019-12-02 PROCEDURE — 77030011640 HC PAD GRND REM COVD -A: Performed by: ORTHOPAEDIC SURGERY

## 2019-12-02 PROCEDURE — 77030029200 HC SUT PASS WRE ARTH -B: Performed by: ORTHOPAEDIC SURGERY

## 2019-12-02 PROCEDURE — 77030031139 HC SUT VCRL2 J&J -A: Performed by: ORTHOPAEDIC SURGERY

## 2019-12-02 PROCEDURE — 74011250637 HC RX REV CODE- 250/637: Performed by: ANESTHESIOLOGY

## 2019-12-02 PROCEDURE — 77030018933 HC KT TRNSTIB ACL4 ARTH -C: Performed by: ORTHOPAEDIC SURGERY

## 2019-12-02 PROCEDURE — 77030040503 HC DRN WND PENRS MDII -A: Performed by: ORTHOPAEDIC SURGERY

## 2019-12-02 PROCEDURE — 77030021162 HC TU IRR ENDOSC BAXT -A: Performed by: ORTHOPAEDIC SURGERY

## 2019-12-02 PROCEDURE — 76060000037 HC ANESTHESIA 3 TO 3.5 HR: Performed by: ORTHOPAEDIC SURGERY

## 2019-12-02 PROCEDURE — 77030002916 HC SUT ETHLN J&J -A: Performed by: ORTHOPAEDIC SURGERY

## 2019-12-02 DEVICE — ACL TIGHTROPE WITH FIBERTAG, ABS
Type: IMPLANTABLE DEVICE | Site: KNEE | Status: FUNCTIONAL
Brand: ARTHREX®

## 2019-12-02 DEVICE — ACL TIGHTROPE WITH FIBERTAG
Type: IMPLANTABLE DEVICE | Site: KNEE | Status: FUNCTIONAL
Brand: ARTHREX®

## 2019-12-02 DEVICE — TIGHTROPE ABS, BUTTON, 8 X 12 MM
Type: IMPLANTABLE DEVICE | Site: KNEE | Status: FUNCTIONAL
Brand: ARTHREX®

## 2019-12-02 RX ORDER — SODIUM CHLORIDE 9 MG/ML
1000 INJECTION, SOLUTION INTRAVENOUS CONTINUOUS
Status: DISCONTINUED | OUTPATIENT
Start: 2019-12-02 | End: 2019-12-02 | Stop reason: HOSPADM

## 2019-12-02 RX ORDER — MIDAZOLAM HYDROCHLORIDE 1 MG/ML
INJECTION, SOLUTION INTRAMUSCULAR; INTRAVENOUS AS NEEDED
Status: DISCONTINUED | OUTPATIENT
Start: 2019-12-02 | End: 2019-12-02 | Stop reason: HOSPADM

## 2019-12-02 RX ORDER — SODIUM CHLORIDE, SODIUM LACTATE, POTASSIUM CHLORIDE, CALCIUM CHLORIDE 600; 310; 30; 20 MG/100ML; MG/100ML; MG/100ML; MG/100ML
125 INJECTION, SOLUTION INTRAVENOUS CONTINUOUS
Status: DISCONTINUED | OUTPATIENT
Start: 2019-12-02 | End: 2019-12-02 | Stop reason: HOSPADM

## 2019-12-02 RX ORDER — MIDAZOLAM HYDROCHLORIDE 1 MG/ML
1 INJECTION, SOLUTION INTRAMUSCULAR; INTRAVENOUS AS NEEDED
Status: DISCONTINUED | OUTPATIENT
Start: 2019-12-02 | End: 2019-12-02 | Stop reason: HOSPADM

## 2019-12-02 RX ORDER — PROPOFOL 10 MG/ML
INJECTION, EMULSION INTRAVENOUS AS NEEDED
Status: DISCONTINUED | OUTPATIENT
Start: 2019-12-02 | End: 2019-12-02 | Stop reason: HOSPADM

## 2019-12-02 RX ORDER — FENTANYL CITRATE 50 UG/ML
INJECTION, SOLUTION INTRAMUSCULAR; INTRAVENOUS AS NEEDED
Status: DISCONTINUED | OUTPATIENT
Start: 2019-12-02 | End: 2019-12-02 | Stop reason: HOSPADM

## 2019-12-02 RX ORDER — DEXMEDETOMIDINE HYDROCHLORIDE 100 UG/ML
INJECTION, SOLUTION INTRAVENOUS AS NEEDED
Status: DISCONTINUED | OUTPATIENT
Start: 2019-12-02 | End: 2019-12-02 | Stop reason: HOSPADM

## 2019-12-02 RX ORDER — ACETAMINOPHEN 325 MG/1
650 TABLET ORAL ONCE
Status: COMPLETED | OUTPATIENT
Start: 2019-12-02 | End: 2019-12-02

## 2019-12-02 RX ORDER — LIDOCAINE HYDROCHLORIDE 20 MG/ML
INJECTION, SOLUTION EPIDURAL; INFILTRATION; INTRACAUDAL; PERINEURAL AS NEEDED
Status: DISCONTINUED | OUTPATIENT
Start: 2019-12-02 | End: 2019-12-02 | Stop reason: HOSPADM

## 2019-12-02 RX ORDER — SODIUM CHLORIDE 0.9 % (FLUSH) 0.9 %
5-40 SYRINGE (ML) INJECTION AS NEEDED
Status: DISCONTINUED | OUTPATIENT
Start: 2019-12-02 | End: 2019-12-02 | Stop reason: HOSPADM

## 2019-12-02 RX ORDER — HYDROMORPHONE HYDROCHLORIDE 1 MG/ML
0.2 INJECTION, SOLUTION INTRAMUSCULAR; INTRAVENOUS; SUBCUTANEOUS
Status: DISCONTINUED | OUTPATIENT
Start: 2019-12-02 | End: 2019-12-02 | Stop reason: HOSPADM

## 2019-12-02 RX ORDER — SODIUM CHLORIDE 0.9 % (FLUSH) 0.9 %
5-40 SYRINGE (ML) INJECTION EVERY 8 HOURS
Status: DISCONTINUED | OUTPATIENT
Start: 2019-12-02 | End: 2019-12-02 | Stop reason: HOSPADM

## 2019-12-02 RX ORDER — SODIUM CHLORIDE 9 MG/ML
50 INJECTION, SOLUTION INTRAVENOUS CONTINUOUS
Status: DISCONTINUED | OUTPATIENT
Start: 2019-12-02 | End: 2019-12-02 | Stop reason: HOSPADM

## 2019-12-02 RX ORDER — FENTANYL CITRATE 50 UG/ML
50 INJECTION, SOLUTION INTRAMUSCULAR; INTRAVENOUS AS NEEDED
Status: DISCONTINUED | OUTPATIENT
Start: 2019-12-02 | End: 2019-12-02 | Stop reason: HOSPADM

## 2019-12-02 RX ORDER — MIDAZOLAM HYDROCHLORIDE 1 MG/ML
0.5 INJECTION, SOLUTION INTRAMUSCULAR; INTRAVENOUS
Status: DISCONTINUED | OUTPATIENT
Start: 2019-12-02 | End: 2019-12-02 | Stop reason: HOSPADM

## 2019-12-02 RX ORDER — DIPHENHYDRAMINE HYDROCHLORIDE 50 MG/ML
12.5 INJECTION, SOLUTION INTRAMUSCULAR; INTRAVENOUS AS NEEDED
Status: DISCONTINUED | OUTPATIENT
Start: 2019-12-02 | End: 2019-12-02 | Stop reason: HOSPADM

## 2019-12-02 RX ORDER — SODIUM CHLORIDE 9 MG/ML
100 INJECTION, SOLUTION INTRAVENOUS CONTINUOUS
Status: DISCONTINUED | OUTPATIENT
Start: 2019-12-02 | End: 2019-12-02 | Stop reason: HOSPADM

## 2019-12-02 RX ORDER — PHENYLEPHRINE HCL IN 0.9% NACL 0.4MG/10ML
SYRINGE (ML) INTRAVENOUS AS NEEDED
Status: DISCONTINUED | OUTPATIENT
Start: 2019-12-02 | End: 2019-12-02 | Stop reason: HOSPADM

## 2019-12-02 RX ORDER — SUCCINYLCHOLINE CHLORIDE 20 MG/ML
INJECTION INTRAMUSCULAR; INTRAVENOUS AS NEEDED
Status: DISCONTINUED | OUTPATIENT
Start: 2019-12-02 | End: 2019-12-02 | Stop reason: HOSPADM

## 2019-12-02 RX ORDER — LIDOCAINE HYDROCHLORIDE 10 MG/ML
0.1 INJECTION, SOLUTION EPIDURAL; INFILTRATION; INTRACAUDAL; PERINEURAL AS NEEDED
Status: DISCONTINUED | OUTPATIENT
Start: 2019-12-02 | End: 2019-12-02 | Stop reason: HOSPADM

## 2019-12-02 RX ORDER — EPHEDRINE SULFATE/0.9% NACL/PF 50 MG/5 ML
5 SYRINGE (ML) INTRAVENOUS AS NEEDED
Status: DISCONTINUED | OUTPATIENT
Start: 2019-12-02 | End: 2019-12-02 | Stop reason: HOSPADM

## 2019-12-02 RX ORDER — MORPHINE SULFATE 10 MG/ML
2 INJECTION, SOLUTION INTRAMUSCULAR; INTRAVENOUS
Status: DISCONTINUED | OUTPATIENT
Start: 2019-12-02 | End: 2019-12-02 | Stop reason: HOSPADM

## 2019-12-02 RX ORDER — ROCURONIUM BROMIDE 10 MG/ML
INJECTION, SOLUTION INTRAVENOUS AS NEEDED
Status: DISCONTINUED | OUTPATIENT
Start: 2019-12-02 | End: 2019-12-02 | Stop reason: HOSPADM

## 2019-12-02 RX ORDER — CLINDAMYCIN PHOSPHATE 600 MG/50ML
600 INJECTION INTRAVENOUS ONCE
Status: COMPLETED | OUTPATIENT
Start: 2019-12-02 | End: 2019-12-02

## 2019-12-02 RX ORDER — DEXAMETHASONE SODIUM PHOSPHATE 4 MG/ML
INJECTION, SOLUTION INTRA-ARTICULAR; INTRALESIONAL; INTRAMUSCULAR; INTRAVENOUS; SOFT TISSUE AS NEEDED
Status: DISCONTINUED | OUTPATIENT
Start: 2019-12-02 | End: 2019-12-02 | Stop reason: HOSPADM

## 2019-12-02 RX ORDER — OXYCODONE AND ACETAMINOPHEN 5; 325 MG/1; MG/1
1 TABLET ORAL AS NEEDED
Status: DISCONTINUED | OUTPATIENT
Start: 2019-12-02 | End: 2019-12-02 | Stop reason: HOSPADM

## 2019-12-02 RX ORDER — ONDANSETRON 2 MG/ML
INJECTION INTRAMUSCULAR; INTRAVENOUS AS NEEDED
Status: DISCONTINUED | OUTPATIENT
Start: 2019-12-02 | End: 2019-12-02 | Stop reason: HOSPADM

## 2019-12-02 RX ORDER — ROPIVACAINE HYDROCHLORIDE 5 MG/ML
INJECTION, SOLUTION EPIDURAL; INFILTRATION; PERINEURAL
Status: COMPLETED | OUTPATIENT
Start: 2019-12-02 | End: 2019-12-02

## 2019-12-02 RX ORDER — ESMOLOL HYDROCHLORIDE 10 MG/ML
INJECTION INTRAVENOUS AS NEEDED
Status: DISCONTINUED | OUTPATIENT
Start: 2019-12-02 | End: 2019-12-02 | Stop reason: HOSPADM

## 2019-12-02 RX ORDER — ONDANSETRON 2 MG/ML
4 INJECTION INTRAMUSCULAR; INTRAVENOUS AS NEEDED
Status: DISCONTINUED | OUTPATIENT
Start: 2019-12-02 | End: 2019-12-02 | Stop reason: HOSPADM

## 2019-12-02 RX ORDER — FENTANYL CITRATE 50 UG/ML
25 INJECTION, SOLUTION INTRAMUSCULAR; INTRAVENOUS
Status: DISCONTINUED | OUTPATIENT
Start: 2019-12-02 | End: 2019-12-02 | Stop reason: HOSPADM

## 2019-12-02 RX ADMIN — HYDROMORPHONE HYDROCHLORIDE 0.2 MG: 1 INJECTION, SOLUTION INTRAMUSCULAR; INTRAVENOUS; SUBCUTANEOUS at 17:20

## 2019-12-02 RX ADMIN — Medication 80 MCG: at 16:13

## 2019-12-02 RX ADMIN — ESMOLOL HYDROCHLORIDE 10 MG: 10 INJECTION, SOLUTION INTRAVENOUS at 13:42

## 2019-12-02 RX ADMIN — ROCURONIUM BROMIDE 35 MG: 10 SOLUTION INTRAVENOUS at 13:47

## 2019-12-02 RX ADMIN — ACETAMINOPHEN 650 MG: 325 TABLET ORAL at 12:43

## 2019-12-02 RX ADMIN — HYDROMORPHONE HYDROCHLORIDE 0.2 MG: 1 INJECTION, SOLUTION INTRAMUSCULAR; INTRAVENOUS; SUBCUTANEOUS at 17:05

## 2019-12-02 RX ADMIN — SODIUM CHLORIDE 100 ML/HR: 900 INJECTION, SOLUTION INTRAVENOUS at 17:05

## 2019-12-02 RX ADMIN — ROCURONIUM BROMIDE 20 MG: 10 SOLUTION INTRAVENOUS at 14:52

## 2019-12-02 RX ADMIN — DEXMEDETOMIDINE HYDROCHLORIDE 4 MCG: 100 INJECTION, SOLUTION, CONCENTRATE INTRAVENOUS at 13:56

## 2019-12-02 RX ADMIN — DEXAMETHASONE SODIUM PHOSPHATE 4 MG: 4 INJECTION, SOLUTION INTRAMUSCULAR; INTRAVENOUS at 13:40

## 2019-12-02 RX ADMIN — MIDAZOLAM 2 MG: 1 INJECTION INTRAMUSCULAR; INTRAVENOUS at 13:05

## 2019-12-02 RX ADMIN — Medication 80 MCG: at 14:25

## 2019-12-02 RX ADMIN — DEXMEDETOMIDINE HYDROCHLORIDE 4 MCG: 100 INJECTION, SOLUTION, CONCENTRATE INTRAVENOUS at 16:04

## 2019-12-02 RX ADMIN — ROPIVACAINE HYDROCHLORIDE 25 ML: 5 INJECTION, SOLUTION EPIDURAL; INFILTRATION; PERINEURAL at 13:02

## 2019-12-02 RX ADMIN — Medication 40 MCG: at 13:58

## 2019-12-02 RX ADMIN — ROCURONIUM BROMIDE 20 MG: 10 SOLUTION INTRAVENOUS at 14:18

## 2019-12-02 RX ADMIN — Medication 40 MCG: at 14:13

## 2019-12-02 RX ADMIN — Medication 40 MCG: at 14:31

## 2019-12-02 RX ADMIN — Medication 80 MCG: at 14:37

## 2019-12-02 RX ADMIN — OXYCODONE HYDROCHLORIDE AND ACETAMINOPHEN 1 TABLET: 5; 325 TABLET ORAL at 17:51

## 2019-12-02 RX ADMIN — ROCURONIUM BROMIDE 10 MG: 10 SOLUTION INTRAVENOUS at 14:03

## 2019-12-02 RX ADMIN — MIDAZOLAM 2 MG: 1 INJECTION INTRAMUSCULAR; INTRAVENOUS at 13:28

## 2019-12-02 RX ADMIN — CLINDAMYCIN PHOSPHATE 600 MG: 600 INJECTION, SOLUTION INTRAVENOUS at 13:45

## 2019-12-02 RX ADMIN — ROCURONIUM BROMIDE 5 MG: 10 SOLUTION INTRAVENOUS at 13:34

## 2019-12-02 RX ADMIN — HYDROMORPHONE HYDROCHLORIDE 0.2 MG: 1 INJECTION, SOLUTION INTRAMUSCULAR; INTRAVENOUS; SUBCUTANEOUS at 16:50

## 2019-12-02 RX ADMIN — ESMOLOL HYDROCHLORIDE 10 MG: 10 INJECTION, SOLUTION INTRAVENOUS at 13:48

## 2019-12-02 RX ADMIN — SUCCINYLCHOLINE CHLORIDE 140 MG: 20 INJECTION, SOLUTION INTRAMUSCULAR; INTRAVENOUS at 13:35

## 2019-12-02 RX ADMIN — LIDOCAINE HYDROCHLORIDE 60 MG: 20 INJECTION, SOLUTION EPIDURAL; INFILTRATION; INTRACAUDAL; PERINEURAL at 13:34

## 2019-12-02 RX ADMIN — ROCURONIUM BROMIDE 10 MG: 10 SOLUTION INTRAVENOUS at 15:12

## 2019-12-02 RX ADMIN — FENTANYL CITRATE 25 MCG: 50 INJECTION, SOLUTION INTRAMUSCULAR; INTRAVENOUS at 16:07

## 2019-12-02 RX ADMIN — PROPOFOL 200 MG: 10 INJECTION, EMULSION INTRAVENOUS at 13:35

## 2019-12-02 RX ADMIN — PROPOFOL 50 MG: 10 INJECTION, EMULSION INTRAVENOUS at 15:47

## 2019-12-02 RX ADMIN — DEXMEDETOMIDINE HYDROCHLORIDE 4 MCG: 100 INJECTION, SOLUTION, CONCENTRATE INTRAVENOUS at 15:58

## 2019-12-02 RX ADMIN — FENTANYL CITRATE 50 MCG: 50 INJECTION, SOLUTION INTRAMUSCULAR; INTRAVENOUS at 15:47

## 2019-12-02 RX ADMIN — ONDANSETRON HYDROCHLORIDE 4 MG: 2 INJECTION, SOLUTION INTRAMUSCULAR; INTRAVENOUS at 15:58

## 2019-12-02 RX ADMIN — ONDANSETRON 4 MG: 2 INJECTION INTRAMUSCULAR; INTRAVENOUS at 16:49

## 2019-12-02 RX ADMIN — FENTANYL CITRATE 100 MCG: 50 INJECTION, SOLUTION INTRAMUSCULAR; INTRAVENOUS at 13:05

## 2019-12-02 RX ADMIN — Medication 40 MCG: at 15:31

## 2019-12-02 RX ADMIN — DEXMEDETOMIDINE HYDROCHLORIDE 4 MCG: 100 INJECTION, SOLUTION, CONCENTRATE INTRAVENOUS at 15:01

## 2019-12-02 RX ADMIN — SODIUM CHLORIDE, SODIUM LACTATE, POTASSIUM CHLORIDE, AND CALCIUM CHLORIDE 125 ML/HR: 600; 310; 30; 20 INJECTION, SOLUTION INTRAVENOUS at 12:40

## 2019-12-02 RX ADMIN — ESMOLOL HYDROCHLORIDE 10 MG: 10 INJECTION, SOLUTION INTRAVENOUS at 15:48

## 2019-12-02 RX ADMIN — HYDROMORPHONE HYDROCHLORIDE 0.2 MG: 1 INJECTION, SOLUTION INTRAMUSCULAR; INTRAVENOUS; SUBCUTANEOUS at 17:50

## 2019-12-02 RX ADMIN — LIDOCAINE HYDROCHLORIDE 40 MG: 20 INJECTION, SOLUTION EPIDURAL; INFILTRATION; INTRACAUDAL; PERINEURAL at 13:50

## 2019-12-02 RX ADMIN — FENTANYL CITRATE 25 MCG: 50 INJECTION, SOLUTION INTRAMUSCULAR; INTRAVENOUS at 16:33

## 2019-12-02 RX ADMIN — DEXMEDETOMIDINE HYDROCHLORIDE 4 MCG: 100 INJECTION, SOLUTION, CONCENTRATE INTRAVENOUS at 14:56

## 2019-12-02 RX ADMIN — HYDROMORPHONE HYDROCHLORIDE 0.2 MG: 1 INJECTION, SOLUTION INTRAMUSCULAR; INTRAVENOUS; SUBCUTANEOUS at 17:35

## 2019-12-02 NOTE — ANESTHESIA PREPROCEDURE EVALUATION
Relevant Problems   No relevant active problems       Anesthetic History   No history of anesthetic complications            Review of Systems / Medical History  Patient summary reviewed, nursing notes reviewed and pertinent labs reviewed    Pulmonary  Within defined limits                 Neuro/Psych   Within defined limits           Cardiovascular  Within defined limits                     GI/Hepatic/Renal  Within defined limits              Endo/Other  Within defined limits           Other Findings              Physical Exam    Airway  Mallampati: I  TM Distance: > 6 cm  Neck ROM: normal range of motion   Mouth opening: Normal     Cardiovascular  Regular rate and rhythm,  S1 and S2 normal,  no murmur, click, rub, or gallop             Dental  No notable dental hx       Pulmonary  Breath sounds clear to auscultation               Abdominal  GI exam deferred       Other Findings            Anesthetic Plan    ASA: 2  Anesthesia type: general      Post-op pain plan if not by surgeon: peripheral nerve block single    Induction: Intravenous  Anesthetic plan and risks discussed with: Patient

## 2019-12-02 NOTE — PERIOP NOTES
Patient: Lisa Wayne MRN: [id number]  SSN: [id number]   YOB: 1990  Age: 34 y.o. Sex: female     Patient is status post Procedure(s):  LEFT KNEE ARTHROSCOPY WITH ANTERIOR CRUCIATE LIGAMENT RECONSTRUCTION, WITH QUADRICEPS AUTOGRAFT (GENERAL W/FEMORAL BLOCK). Surgeon(s) and Role:     * Joie Hernandez MD - Primary     * Jazzy Larkin DO - Fellow    Local/Dose/Irrigation: No local given                Peripheral IV 12/02/19 Left Hand (Active)   Site Assessment Clean, dry, & intact 12/2/2019 12:34 PM   Phlebitis Assessment 0 12/2/2019 12:34 PM   Infiltration Assessment 0 12/2/2019 12:34 PM   Dressing Status Clean, dry, & intact 12/2/2019 12:34 PM   Dressing Type Transparent;Tape 12/2/2019 12:34 PM   Hub Color/Line Status Green 12/2/2019 12:34 PM                       Dressing/Packing:  Wound Knee Left-Dressing Type: 4 x 4;ABD pad; Adhesive wound closure strips (Steri-Strips); Cast padding;Elastic bandage;Petroleum gauze; Other (Comment)(Compression stocking) (12/02/19 3264)    Splint/Cast: Wound Knee Left-Splint Type/Material: Knee immobilizer]

## 2019-12-02 NOTE — ANESTHESIA PROCEDURE NOTES
Peripheral Block    Start time: 12/2/2019 1:05 PM  End time: 12/2/2019 1:15 PM  Performed by: Dinah Wong MD  Authorized by: Dinah Wong MD       Pre-procedure:    Indications: at surgeon's request and post-op pain management    Preanesthetic Checklist: patient identified, risks and benefits discussed, site marked, timeout performed, anesthesia consent given and patient being monitored    Timeout Time: 13:06          Block Type:   Block Type:  Femoral single shot  Laterality:  Left  Monitoring:  Standard ASA monitoring, continuous pulse ox, frequent vital sign checks, heart rate, responsive to questions and oxygen  Injection Technique:  Single shot  Procedures: ultrasound guided and nerve stimulator    Patient Position: supine  Prep: chlorhexidine    Location:  Upper thigh  Needle Type:  Stimuplex  Needle Gauge:  22 G  Needle Localization:  Nerve stimulator and ultrasound guidance  Motor Response: minimal motor response >0.4 mA      Assessment:  Number of attempts:  1  Injection Assessment:  Incremental injection every 5 mL, negative aspiration for blood, local visualized surrounding nerve on ultrasound, no intravascular symptoms, negative aspiration for CSF and no paresthesia  Patient tolerance:  Patient tolerated the procedure well with no immediate complications

## 2019-12-02 NOTE — DISCHARGE INSTRUCTIONS
Dr. Keisha Su Instructions for ACL Surgery Patients    *IMPORTANT*    Successful rehabilitation after ACL surgery has been directly associated with the ability to fully straighten out your knee at the end of rehabilitation, and following these instructions will help insure that we do everything possible to achieve this goal. Elevation of the leg along with cold therapy is critical in the first weeks after surgery in an effort to reduce swelling. This insures that the knee will move easily to regain motion once therapy is started the second week. 1. Your leg has been placed in a hinged brace after surgery for comfort and to protect your new graft  2. The brace has been locked at -10 degrees to keep your leg as straight as possible after surgery  3. This allows you to walk on the leg without the leg giving way and to prevent you from loosing the ability to fully straighten out your leg  4. This brace is to be worn at all times that you are out of bed after surgery  5. The brace may be taken off when you are in bed and laying down so that you may apply the cryotherapy unit (ice packs) over the dressing  6. The optimal position of the leg after surgery is for you to be lying flat with your ankle higher than your knee and your knee higher than the heart  7. Place pillows under your ankle to allow your knee to sag backwards to keep it straight  8. You may practice bending the knee with no weight on it, but should return it to a straight position when you are resting and elevating  9. Please do not sit with a pillow under your knee, as this may lead to you being unable to fully straighten your leg after surgery  10. If for some reason your brace becomes unlocked or loose, please contact my office for instructions on how to fix this issue  11.  You will come out of your brace with your physical therapist for therapy, but will not be released from your brace until you can demonstrate the ability to walk without crutches without a limp    Medications/Diet  ACETAMINOPHEN 650 MG GIVEN [email or web address] PM    PERCOCET 5-325 MG GIVEN TODAY @ 1751    1. Eat only light, non-greasy foods today  2. Take pain medicine with food  3. While taking pain medicines, you may not operate a vehicle, heavy machinery, or appliances  4. While taking pain medicines, you may not drink alcoholic beverages  5. While taking pain medicines, you may not make critical decisions or sign legal papers  6. If you have any reactions to your medicines, stop taking them and call my office immediately  7. If you are not allergic, take one 325mg aspirin twice a day to help prevent blood clots  8. Please keep in mind that constipation is a very common side   effect of taking narcotic pain medication. We recommend that patients take precautions to prevent constipation:   Drink plenty of water (6-8 glasses of 8 oz. a day)   Avoid alcohol, caffeine, and dairy products   Eat plenty of fiber (fruits, vegetables and whole grains)   Take an over the counter stool softener (colace or dulcolax)    Activity/Exercise    1. You may bend your knee as much as the dressings will allow  2. You may practice quadriceps muscle tightening and straight leg raises several times every hour  3. Please continue to move your ankle up and down and tighten and relax your calf muscles several times every hour to help reduce swelling and prevent blood clots  4. Please use your crutches until your first post operative visit  5. If comfortable, you may bear weight on your leg with the assistance of crutches  6. Please elevate your knee above the level of your heart while sitting or lying down to help reduce swelling. It is important to continuously elevate your knee above your heart until your swelling is completely down  7.  Please keep ice applied to the knee for the first 72 hours or as long as pain or swelling persist. Do not apply ice directly to skin, or allow water to leak on your dressing    Dressings/Shower  1. Please keep dressing dry  2. If you have had arthroscopy, you can expect some bloody drainage through your post-op dressing, which is normal  3. Please reinforce your dressing with a dry sterile dressing  4. Loosen the ace wrap around your knee if it becomes too tight or painful  5. You may remove your dressing 2 days after surgery  6. You may shower after you remove your dressing    Emergency/Follow-Up  1. Please notify my office at [phone] if you develop any fever (101° or above), unexpected warmth, redness or swelling in your knee. Please call if your toes become cold, purple, numb, or there is excessive bleeding  2. Please call the office within 24 hours at [phone] to schedule a follow up appointment next week  3. Please call the office before 3pm on Friday if you do not have enough pain medicine for the weekend      Patient Education        Learning About How to Use [address] can help you walk when you have an injured hip, leg, knee, ankle, or foot. Your doctor will tell you how much weight--if any--you can put on your leg. Be sure your crutches fit you. When you stand up in your normal posture, there should be space for two or three fingers between the top of the crutch and your armpit. When you let your hands hang down, the hand  should be at your wrists. When you put your hands on the hand , your elbows should be slightly bent. To stay safe when using crutches:  · Look straight ahead, not down at your feet. · Clear away small rugs, cords, or anything else that could cause you to trip, slip, or fall. · Be very careful around pets and small children. They can get in your path when you least expect it. · Be sure the rubber tips on your crutches are clean and in good condition to help prevent slipping. · Avoid slick conditions, such as wet floors and snowy or icy driveways.  In bad weather, be especially careful on curbs and steps.  How to use crutches  Getting ready to walk    1. Bend your elbows slightly. Press the padded top parts of the crutches against your sides, under your armpits. 2. If you have been told not to put any weight on your injured leg, keep that leg bent and off the ground. How to walk with crutches when you can put weight on the injured leg    1. Put both crutches about 12 inches in front of you. The crutches and your feet should form a triangle. Hold the crutches close enough to your body so you can push straight down on them, but leave room between the crutches for your body to pass through. Don't lean forward to reach farther. 2. Put your weight on the handgrips, not on the pads under your arms. Constant pressure against your underarms can cause numbness. 3. Move your weak or injured leg forward so it's almost even with the crutches. 4. Bring your good leg up, so it's even with your weak or injured leg. 5. Move your crutches about 12 inches in front of you, and start the next step. Sitting down    1. To sit, back up to the chair. Use one hand to hold both crutches by the handgrips, beside your injured leg. With the other hand, hold onto the seat and slowly lower yourself onto the chair. 2. Lay the crutches on the ground near your chair. If you prop them up, they may fall over. Getting up from a chair    1. To get up from a chair,  the crutches and put them in one hand beside your injured leg. 2. Put your weight on the handgrips of the crutches and on your strong leg to stand up. How to go up and down stairs using crutches    1. Stand near the edge of the stairs. 2. Go up or down the stairs. ? If you are going up, step up with your stronger leg. Then bring the crutches and your weak or injured leg to the upper step. ? If you are going down, put your crutches and your weak or injured leg on the lower step. Then bring your stronger leg down to the lower step.   Remember \"up with the good, and down with the bad\" to help you lead with the correct leg. Crutches: How to go up and down stairs with handrails    1. Stand near the edge of the stairs. 2. Put both crutches under the arm opposite the handrail. 3. Use the hand opposite the handrail to hold both crutches by the handgrips. 4. Hold onto the handrail as you go up or down. 5. Go up or down the stairs. ? If you are going up, step up with your stronger leg. Then bring the crutches and your weak or injured leg to the upper step. ? If you are going down, put your crutches and your weak or injured leg on the lower step. Then bring your stronger leg down to the lower step. Remember \"up with the good, down with the bad\" to help you lead with the correct leg. Follow-up care is a key part of your treatment and safety. Be sure to make and go to all appointments, and call your doctor if you are having problems. It's also a good idea to know your test results and keep a list of the medicines you take. Where can you learn more? Go to http://ama-alyson.info/. Enter M955 in the search box to learn more about \"Learning About How to Use Crutches. \"  Current as of: June 26, 2019  Content Version: 12.2  © 6285-4516 [employer]. Care instructions adapted under license by [employer] (which disclaims liability or warranty for this information). If you have questions about a medical condition or this instruction, always ask your healthcare professional. [address] any warranty or liability for your use of this information.       ______________________________________________________________________    Anesthesia Discharge Instructions    After general anesthesia or intervenous sedation, for 24 hours or while taking prescription Narcotics:  · Limit your activities  · Do not drive or operate hazardous machinery  · If you have not urinated within 8 hours after discharge, please contact your surgeon on call. · Do not make important personal or business decisions  · Do not drink alcoholic beverages    Report the following to your surgeon:  · Excessive pain, swelling, redness or odor of or around the surgical area  · Temperature over 100.5 degrees  · Nausea and vomiting lasting longer than 4 hours or if unable to take medication  · Any signs of decreased circulation or nerve impairment to extremity:  Change in color, persistent numbness, tingling, coldness or increased pain.   · Any questions

## 2019-12-02 NOTE — ANESTHESIA POSTPROCEDURE EVALUATION
Post-Anesthesia Evaluation and Assessment    Patient: Anay Rodriguez MRN: [id number]  SSN: [id number]    YOB: 1990  Age: 34 y.o. Sex: female      I have evaluated the patient and they are stable and ready for discharge from the PACU. Cardiovascular Function/Vital Signs  Visit Vitals  /64   Pulse 89   Temp 36.8 °C (98.2 °F)   Resp 13   Ht 5' 7.5\" (1.715 m)   Wt 83.5 kg (184 lb)   SpO2 94%   BMI 28.39 kg/m²       Patient is status post General anesthesia for Procedure(s):  LEFT KNEE ARTHROSCOPY WITH ANTERIOR CRUCIATE LIGAMENT RECONSTRUCTION, WITH QUADRICEPS AUTOGRAFT (GENERAL W/FEMORAL BLOCK). Nausea/Vomiting: None    Postoperative hydration reviewed and adequate. Pain:  Pain Scale 1: Numeric (0 - 10) (12/02/19 1705)  Pain Intensity 1: 3 (12/02/19 1705)   Managed    Neurological Status:   Neuro (WDL): Within Defined Limits (12/02/19 1238)   At baseline    Mental Status, Level of Consciousness: Alert and  oriented to person, place, and time    Pulmonary Status:   O2 Device: Room air (12/02/19 1230)   Adequate oxygenation and airway patent    Complications related to anesthesia: None    Post-anesthesia assessment completed. No concerns    Signed By: Jean Paul Llanes MD     December 2, 2019              Procedure(s):  LEFT KNEE ARTHROSCOPY WITH ANTERIOR CRUCIATE LIGAMENT RECONSTRUCTION, WITH QUADRICEPS AUTOGRAFT (GENERAL W/FEMORAL BLOCK). general    <BSHSIANPOST>    Vitals Value Taken Time   /67 12/2/2019  5:15 PM   Temp 36.8 °C (98.2 °F) 12/2/2019  4:30 PM   Pulse 84 12/2/2019  5:16 PM   Resp 16 12/2/2019  5:16 PM   SpO2 97 % 12/2/2019  5:16 PM   Vitals shown include unvalidated device data.

## 2019-12-03 NOTE — OP NOTES
[address]   OPERATIVE REPORT    Name:  Kalpana Xie  MR#:  [id number]  :  1990  ACCOUNT #:  [de-identified]  DATE OF SERVICE:  2019    PREOPERATIVE DIAGNOSIS:  Left knee anterior cruciate ligament graft tear. POSTOPERATIVE DIAGNOSIS:  Left knee anterior cruciate ligament graft tear. PROCEDURE PERFORMED:  Left knee arthroscopy with arthroscopic-assisted anterior cruciate ligament revision with quadriceps autograft, 10 mm. SURGEON:  Matthew Navarro MD    FIRST ASSISTANT:  Shital García DO    ANESTHESIA:  General with block. COMPLICATIONS:  None. SPECIMENS REMOVED:  None. ESTIMATED BLOOD LOSS:  Approximately 10 mL. DRAINS:  None. INDICATIONS FOR PROCEDURE:  The patient is a 77-year-old female with a long history of pain in her left knee. She initially had an ACL reconstruction, a number of years ago and underwent an allograft. She presents today for the above procedure with MRI-documented pathology after extensive preoperative workup. PROCEDURE IN DETAIL:  After being explained the risks and benefits, and undergoing extensive preoperative workup, the patient underwent informed consent for the above procedure. After being kept n.p.o. overnight and receiving preoperative antibiotics, the patient was taken from the holding area to the operating room. After general anesthesia block had been established, the patient was placed supine on a well-padded operating room table. The left lower extremity was examined under anesthesia, prepped and draped in standard surgical fashion and placed in a well-padded leg jain. The left lower extremity was elevated, exsanguinated and the tourniquet was raised to 300 mmHg. A time-out was called. Correct patient and site were identified with the operating room staff and Anesthesia staff. We then turned our attention to the graft harvest.  We created a 3 cm incision just proximal to the patella.   This was carried down to the quadriceps tendon. We proceeded to use a 10 mm graft harvest knife to harvest a 10 x 70 mm graft from the patellar tendon. This was passed off the back table. It was fashioned to a 10 mm quadriceps autograft, measuring approximately 68 mm in length. We then turned our attention to the intraarticular aspect of the knee. We created our standard anterior and medial portals. The diagnostic arthroscopy was undertaken. There were no loose bodies in the suprapatellar pouch. There were no loose bodies in the gutters. There were no chondral lesions within the patellofemoral joint. There were no loose bodies. We proceeded to inspect the medial compartment. The meniscus was intact. There were no chondral lesions with the medial compartment. We proceeded into the notch. The ACL was chronically torn. The PCL was intact. We proceeded in the lateral compartment. There were no meniscal tears or chondral lesions present. We proceeded to prepare the notch for drilling a socket. We used the Arthrex 69 guide to drill our femoral socket, which measured approximately 25 mm in length. We then used the Oval guide to drill our tibial socket, which measured approximately 40 mm in length. We initially passed our graft into the knee. This was fixed initially to the femur using the tightrope. Graft was pulled into the femoral socket cycle. We then proceeded to the pull the graft into the tibial socket with retention and the ABS button was used for fixation on the tibial cortex. We cycled the knee and had full passive range of motion to the knee. There was no evidence of PCL, wall or roof impingement of the graft. We took final arthroscopic pictures of the graft. We irrigated copiously. We closed the harvest site with interrupted Vicryl sutures as well as Monocryl suture and Steri-Strips. We closed the portals with interrupted nylon suture. Dry sterile dressing was applied.   At the end of the case, all counts were correct. The patient was placed in a dry sterile dressing and in brace locked in full extension and taken to recovery room in stable condition.         Horatio Najjar, MD [email or web address]  D:  12/02/2019 16:27  T:  12/03/2019 2:45  JOB #:  [id number]  CC:  Mell Galaviz MD

## 2020-02-16 NOTE — PROGRESS NOTES
No bleeding. Baby moving. Saw MFM, normal.   Glucola and tdap next visit.  Has MFM fu in 4 weeks
Problem List  Date Reviewed: 12/21/2017          Codes Class Noted    Encounter for supervision of other normal pregnancy, first trimester ICD-10-CM: Z34.81  ICD-9-CM: V22.1  9/19/2017    Overview Addendum 10/12/2017 10:33 AM by Lianet Rubin     Intrauterine pregnancy with the following problems identified:   [facility] 4/21/2018 by [facility] to [place] 2 weeks prior to conception - pt and partner no sx- advised to use condoms  SMA--low risk  Flu vaccine 10/12/17  9/26/17 NIPTS normal             Environmental allergies ICD-10-CM: Z91.09  ICD-9-CM: V15.09  3/23/2012
No

## 2020-05-20 ENCOUNTER — HOSPITAL ENCOUNTER (OUTPATIENT)
Dept: PREADMISSION TESTING | Age: 30
Discharge: HOME | End: 2020-05-20
Payer: SELF-PAY

## 2020-05-20 VITALS
WEIGHT: 185 LBS | SYSTOLIC BLOOD PRESSURE: 110 MMHG | DIASTOLIC BLOOD PRESSURE: 78 MMHG | BODY MASS INDEX: 28.04 KG/M2 | TEMPERATURE: 98.7 F | HEART RATE: 88 BPM | HEIGHT: 68 IN

## 2020-05-20 LAB
ANION GAP SERPL CALC-SCNC: 5 MMOL/L (ref 5–15)
ATRIAL RATE: 79 BPM
BASOPHILS # BLD: 0 K/UL (ref 0–0.1)
BASOPHILS NFR BLD: 1 % (ref 0–1)
BUN SERPL-MCNC: 8 MG/DL (ref 6–20)
BUN/CREAT SERPL: 10 (ref 12–20)
CALCIUM SERPL-MCNC: 8.7 MG/DL (ref 8.5–10.1)
CALCULATED P AXIS, ECG09: 50 DEGREES
CALCULATED R AXIS, ECG10: 53 DEGREES
CALCULATED T AXIS, ECG11: 43 DEGREES
CHLORIDE SERPL-SCNC: 108 MMOL/L (ref 97–108)
CO2 SERPL-SCNC: 26 MMOL/L (ref 21–32)
CREAT SERPL-MCNC: 0.77 MG/DL (ref 0.55–1.02)
DIAGNOSIS, 93000: NORMAL
DIFFERENTIAL METHOD BLD: NORMAL
EOSINOPHIL # BLD: 0.2 K/UL (ref 0–0.4)
EOSINOPHIL NFR BLD: 2 % (ref 0–7)
ERYTHROCYTE [DISTWIDTH] IN BLOOD BY AUTOMATED COUNT: 12 % (ref 11.5–14.5)
GLUCOSE SERPL-MCNC: 101 MG/DL (ref 65–100)
HCT VFR BLD AUTO: 42 % (ref 35–47)
HGB BLD-MCNC: 13.9 G/DL (ref 11.5–16)
IMM GRANULOCYTES # BLD AUTO: 0 K/UL (ref 0–0.04)
IMM GRANULOCYTES NFR BLD AUTO: 0 % (ref 0–0.5)
LYMPHOCYTES # BLD: 1.8 K/UL (ref 0.8–3.5)
LYMPHOCYTES NFR BLD: 27 % (ref 12–49)
MCH RBC QN AUTO: 31.5 PG (ref 26–34)
MCHC RBC AUTO-ENTMCNC: 33.1 G/DL (ref 30–36.5)
MCV RBC AUTO: 95.2 FL (ref 80–99)
MONOCYTES # BLD: 0.5 K/UL (ref 0–1)
MONOCYTES NFR BLD: 7 % (ref 5–13)
NEUTS SEG # BLD: 4.2 K/UL (ref 1.8–8)
NEUTS SEG NFR BLD: 63 % (ref 32–75)
NRBC # BLD: 0 K/UL (ref 0–0.01)
NRBC BLD-RTO: 0 PER 100 WBC
P-R INTERVAL, ECG05: 160 MS
PLATELET # BLD AUTO: 273 K/UL (ref 150–400)
PMV BLD AUTO: 9.5 FL (ref 8.9–12.9)
POTASSIUM SERPL-SCNC: 4.3 MMOL/L (ref 3.5–5.1)
Q-T INTERVAL, ECG07: 368 MS
QRS DURATION, ECG06: 82 MS
QTC CALCULATION (BEZET), ECG08: 421 MS
RBC # BLD AUTO: 4.41 M/UL (ref 3.8–5.2)
SODIUM SERPL-SCNC: 139 MMOL/L (ref 136–145)
VENTRICULAR RATE, ECG03: 79 BPM
WBC # BLD AUTO: 6.6 K/UL (ref 3.6–11)

## 2020-05-20 PROCEDURE — 93005 ELECTROCARDIOGRAM TRACING: CPT

## 2020-05-20 PROCEDURE — 85025 COMPLETE CBC W/AUTO DIFF WBC: CPT

## 2020-05-20 PROCEDURE — 36415 COLL VENOUS BLD VENIPUNCTURE: CPT

## 2020-05-20 PROCEDURE — 80048 BASIC METABOLIC PNL TOTAL CA: CPT

## 2020-05-20 RX ORDER — BISMUTH SUBSALICYLATE 262 MG
1 TABLET,CHEWABLE ORAL DAILY
COMMUNITY
End: 2021-10-22

## 2020-05-20 NOTE — PERIOP NOTES
PREOPERATIVE INSTRUCTIONS REVIEWED WITH PATIENT. PATIENT GIVEN TWO-- BOTTLES OF CHG SOAPS  INSTRUCTIONS REVIEWED ON USE OF CHG SOAPS   PATIENT GIVEN SSI INFECTIONS SHEET. PATIENT WAS GIVEN THE OPPORTUNITY TO ASK QUESTIONS ON THE INFORMATION PROVIDED. PT  MADE AWARE OF COVID 19 TESTING NEEDED TO BE DONE WITHIN 96 HOURS OF SURGERY. PT INSTRUCTED ON SELF QUARANTINE  BETWEEN TESTING AND ARRIVAL TIME  ON DAY OF SURGERY. INSTRUCTION PROVIDED FOR CELL PHONE WAITING AREA, PT INFORMATION AND INSTRUCTIONS FOR APPOINTMENTS AT [facility] ON DAY OF SURGERY.

## 2020-05-24 ENCOUNTER — HOSPITAL ENCOUNTER (OUTPATIENT)
Dept: PREADMISSION TESTING | Age: 30
Discharge: HOME | End: 2020-05-24
Attending: PHYSICIAN ASSISTANT
Payer: MEDICAID

## 2020-05-24 DIAGNOSIS — Z20.822 ENCOUNTER FOR LABORATORY TESTING FOR COVID-19 VIRUS: ICD-10-CM

## 2020-05-24 PROCEDURE — 87635 SARS-COV-2 COVID-19 AMP PRB: CPT

## 2020-05-25 LAB — SARS-COV-2, COV2NT: NOT DETECTED

## 2020-05-27 ENCOUNTER — ANESTHESIA EVENT (OUTPATIENT)
Dept: SURGERY | Age: 30
End: 2020-05-27
Payer: SELF-PAY

## 2020-05-28 ENCOUNTER — ANESTHESIA (OUTPATIENT)
Dept: SURGERY | Age: 30
End: 2020-05-28
Payer: SELF-PAY

## 2020-05-28 ENCOUNTER — HOSPITAL ENCOUNTER (OUTPATIENT)
Age: 30
Setting detail: OUTPATIENT SURGERY
Discharge: HOME | End: 2020-05-28
Attending: PLASTIC SURGERY | Admitting: PLASTIC SURGERY
Payer: SELF-PAY

## 2020-05-28 VITALS
WEIGHT: 185 LBS | SYSTOLIC BLOOD PRESSURE: 128 MMHG | OXYGEN SATURATION: 100 % | TEMPERATURE: 98.7 F | DIASTOLIC BLOOD PRESSURE: 72 MMHG | HEIGHT: 68 IN | BODY MASS INDEX: 28.04 KG/M2 | HEART RATE: 81 BPM | RESPIRATION RATE: 13 BRPM

## 2020-05-28 LAB — HCG UR QL: NEGATIVE

## 2020-05-28 PROCEDURE — 74011000250 HC RX REV CODE- 250: Performed by: PLASTIC SURGERY

## 2020-05-28 PROCEDURE — 77030013079 HC BLNKT BAIR HGGR 3M -A: Performed by: ANESTHESIOLOGY

## 2020-05-28 PROCEDURE — 76210000020 HC REC RM PH II FIRST 0.5 HR: Performed by: PLASTIC SURGERY

## 2020-05-28 PROCEDURE — 77030040922 HC BLNKT HYPOTHRM STRY -A

## 2020-05-28 PROCEDURE — 74011250636 HC RX REV CODE- 250/636: Performed by: ANESTHESIOLOGY

## 2020-05-28 PROCEDURE — 76010000131 HC OR TIME 2 TO 2.5 HR: Performed by: PLASTIC SURGERY

## 2020-05-28 PROCEDURE — 77030008462 HC STPLR SKN PROX J&J -A: Performed by: PLASTIC SURGERY

## 2020-05-28 PROCEDURE — 74011000250 HC RX REV CODE- 250: Performed by: NURSE ANESTHETIST, CERTIFIED REGISTERED

## 2020-05-28 PROCEDURE — 74011250636 HC RX REV CODE- 250/636: Performed by: PLASTIC SURGERY

## 2020-05-28 PROCEDURE — 76060000036 HC ANESTHESIA 2.5 TO 3 HR: Performed by: PLASTIC SURGERY

## 2020-05-28 PROCEDURE — 77030026438 HC STYL ET INTUB CARD -A: Performed by: ANESTHESIOLOGY

## 2020-05-28 PROCEDURE — 77030012406 HC DRN WND PENRS BARD -A: Performed by: PLASTIC SURGERY

## 2020-05-28 PROCEDURE — 74011250636 HC RX REV CODE- 250/636: Performed by: NURSE ANESTHETIST, CERTIFIED REGISTERED

## 2020-05-28 PROCEDURE — 77030018836 HC SOL IRR NACL ICUM -A: Performed by: PLASTIC SURGERY

## 2020-05-28 PROCEDURE — 81025 URINE PREGNANCY TEST: CPT

## 2020-05-28 PROCEDURE — 77030011640 HC PAD GRND REM COVD -A: Performed by: PLASTIC SURGERY

## 2020-05-28 PROCEDURE — 74011250637 HC RX REV CODE- 250/637: Performed by: NURSE ANESTHETIST, CERTIFIED REGISTERED

## 2020-05-28 PROCEDURE — 76210000006 HC OR PH I REC 0.5 TO 1 HR: Performed by: PLASTIC SURGERY

## 2020-05-28 PROCEDURE — 77030040361 HC SLV COMPR DVT MDII -B: Performed by: PLASTIC SURGERY

## 2020-05-28 PROCEDURE — 74011250637 HC RX REV CODE- 250/637: Performed by: ANESTHESIOLOGY

## 2020-05-28 PROCEDURE — 77030008684 HC TU ET CUF COVD -B: Performed by: ANESTHESIOLOGY

## 2020-05-28 PROCEDURE — 77030002916 HC SUT ETHLN J&J -A: Performed by: PLASTIC SURGERY

## 2020-05-28 PROCEDURE — 77030008534 HC TBNG LIPOSUC BYRO -B: Performed by: PLASTIC SURGERY

## 2020-05-28 PROCEDURE — 77030008538 HC TBNG LIPO SUC WELL -B: Performed by: PLASTIC SURGERY

## 2020-05-28 RX ORDER — SUCCINYLCHOLINE CHLORIDE 20 MG/ML
INJECTION INTRAMUSCULAR; INTRAVENOUS AS NEEDED
Status: DISCONTINUED | OUTPATIENT
Start: 2020-05-28 | End: 2020-05-28 | Stop reason: HOSPADM

## 2020-05-28 RX ORDER — SODIUM CHLORIDE 0.9 % (FLUSH) 0.9 %
5-40 SYRINGE (ML) INJECTION AS NEEDED
Status: DISCONTINUED | OUTPATIENT
Start: 2020-05-28 | End: 2020-05-28 | Stop reason: HOSPADM

## 2020-05-28 RX ORDER — CLINDAMYCIN PHOSPHATE 900 MG/50ML
900 INJECTION INTRAVENOUS
Status: COMPLETED | OUTPATIENT
Start: 2020-05-28 | End: 2020-05-28

## 2020-05-28 RX ORDER — MIDAZOLAM HYDROCHLORIDE 1 MG/ML
INJECTION, SOLUTION INTRAMUSCULAR; INTRAVENOUS AS NEEDED
Status: DISCONTINUED | OUTPATIENT
Start: 2020-05-28 | End: 2020-05-28 | Stop reason: HOSPADM

## 2020-05-28 RX ORDER — SODIUM CHLORIDE 0.9 % (FLUSH) 0.9 %
5-40 SYRINGE (ML) INJECTION EVERY 8 HOURS
Status: DISCONTINUED | OUTPATIENT
Start: 2020-05-28 | End: 2020-05-28 | Stop reason: HOSPADM

## 2020-05-28 RX ORDER — ACETAMINOPHEN 325 MG/1
650 TABLET ORAL ONCE
Status: COMPLETED | OUTPATIENT
Start: 2020-05-28 | End: 2020-05-28

## 2020-05-28 RX ORDER — NEOSTIGMINE METHYLSULFATE 1 MG/ML
INJECTION, SOLUTION INTRAVENOUS AS NEEDED
Status: DISCONTINUED | OUTPATIENT
Start: 2020-05-28 | End: 2020-05-28 | Stop reason: HOSPADM

## 2020-05-28 RX ORDER — GLYCOPYRROLATE 0.2 MG/ML
INJECTION INTRAMUSCULAR; INTRAVENOUS AS NEEDED
Status: DISCONTINUED | OUTPATIENT
Start: 2020-05-28 | End: 2020-05-28 | Stop reason: HOSPADM

## 2020-05-28 RX ORDER — PROPOFOL 10 MG/ML
INJECTION, EMULSION INTRAVENOUS AS NEEDED
Status: DISCONTINUED | OUTPATIENT
Start: 2020-05-28 | End: 2020-05-28 | Stop reason: HOSPADM

## 2020-05-28 RX ORDER — BACITRACIN 500 [USP'U]/G
5 OINTMENT TOPICAL 2 TIMES DAILY
Status: DISCONTINUED | OUTPATIENT
Start: 2020-05-28 | End: 2020-05-28 | Stop reason: HOSPADM

## 2020-05-28 RX ORDER — ONDANSETRON 2 MG/ML
INJECTION INTRAMUSCULAR; INTRAVENOUS AS NEEDED
Status: DISCONTINUED | OUTPATIENT
Start: 2020-05-28 | End: 2020-05-28 | Stop reason: HOSPADM

## 2020-05-28 RX ORDER — FENTANYL CITRATE 50 UG/ML
25 INJECTION, SOLUTION INTRAMUSCULAR; INTRAVENOUS
Status: DISCONTINUED | OUTPATIENT
Start: 2020-05-28 | End: 2020-05-28 | Stop reason: HOSPADM

## 2020-05-28 RX ORDER — ONDANSETRON 2 MG/ML
4 INJECTION INTRAMUSCULAR; INTRAVENOUS AS NEEDED
Status: DISCONTINUED | OUTPATIENT
Start: 2020-05-28 | End: 2020-05-28 | Stop reason: HOSPADM

## 2020-05-28 RX ORDER — MIDAZOLAM HYDROCHLORIDE 1 MG/ML
1 INJECTION, SOLUTION INTRAMUSCULAR; INTRAVENOUS AS NEEDED
Status: DISCONTINUED | OUTPATIENT
Start: 2020-05-28 | End: 2020-05-28 | Stop reason: HOSPADM

## 2020-05-28 RX ORDER — SCOLOPAMINE TRANSDERMAL SYSTEM 1 MG/1
PATCH, EXTENDED RELEASE TRANSDERMAL AS NEEDED
Status: DISCONTINUED | OUTPATIENT
Start: 2020-05-28 | End: 2020-05-28 | Stop reason: HOSPADM

## 2020-05-28 RX ORDER — FENTANYL CITRATE 50 UG/ML
INJECTION, SOLUTION INTRAMUSCULAR; INTRAVENOUS AS NEEDED
Status: DISCONTINUED | OUTPATIENT
Start: 2020-05-28 | End: 2020-05-28 | Stop reason: HOSPADM

## 2020-05-28 RX ORDER — DEXAMETHASONE SODIUM PHOSPHATE 4 MG/ML
INJECTION, SOLUTION INTRA-ARTICULAR; INTRALESIONAL; INTRAMUSCULAR; INTRAVENOUS; SOFT TISSUE AS NEEDED
Status: DISCONTINUED | OUTPATIENT
Start: 2020-05-28 | End: 2020-05-28 | Stop reason: HOSPADM

## 2020-05-28 RX ORDER — LIDOCAINE HYDROCHLORIDE 10 MG/ML
0.1 INJECTION, SOLUTION EPIDURAL; INFILTRATION; INTRACAUDAL; PERINEURAL AS NEEDED
Status: DISCONTINUED | OUTPATIENT
Start: 2020-05-28 | End: 2020-05-28 | Stop reason: HOSPADM

## 2020-05-28 RX ORDER — ROCURONIUM BROMIDE 10 MG/ML
INJECTION, SOLUTION INTRAVENOUS AS NEEDED
Status: DISCONTINUED | OUTPATIENT
Start: 2020-05-28 | End: 2020-05-28 | Stop reason: HOSPADM

## 2020-05-28 RX ORDER — HYDROMORPHONE HYDROCHLORIDE 1 MG/ML
0.2 INJECTION, SOLUTION INTRAMUSCULAR; INTRAVENOUS; SUBCUTANEOUS
Status: DISCONTINUED | OUTPATIENT
Start: 2020-05-28 | End: 2020-05-28 | Stop reason: HOSPADM

## 2020-05-28 RX ORDER — SODIUM CHLORIDE, SODIUM LACTATE, POTASSIUM CHLORIDE, CALCIUM CHLORIDE 600; 310; 30; 20 MG/100ML; MG/100ML; MG/100ML; MG/100ML
50 INJECTION, SOLUTION INTRAVENOUS CONTINUOUS
Status: DISCONTINUED | OUTPATIENT
Start: 2020-05-28 | End: 2020-05-28 | Stop reason: HOSPADM

## 2020-05-28 RX ORDER — LIDOCAINE HYDROCHLORIDE 20 MG/ML
INJECTION, SOLUTION EPIDURAL; INFILTRATION; INTRACAUDAL; PERINEURAL AS NEEDED
Status: DISCONTINUED | OUTPATIENT
Start: 2020-05-28 | End: 2020-05-28 | Stop reason: HOSPADM

## 2020-05-28 RX ORDER — CLINDAMYCIN PHOSPHATE 600 MG/50ML
600 INJECTION INTRAVENOUS ONCE
Status: DISCONTINUED | OUTPATIENT
Start: 2020-05-28 | End: 2020-05-28 | Stop reason: DRUGHIGH

## 2020-05-28 RX ORDER — FENTANYL CITRATE 50 UG/ML
50 INJECTION, SOLUTION INTRAMUSCULAR; INTRAVENOUS AS NEEDED
Status: DISCONTINUED | OUTPATIENT
Start: 2020-05-28 | End: 2020-05-28 | Stop reason: HOSPADM

## 2020-05-28 RX ADMIN — MEPERIDINE HYDROCHLORIDE 25 MG: 50 INJECTION INTRAMUSCULAR; INTRAVENOUS; SUBCUTANEOUS at 10:30

## 2020-05-28 RX ADMIN — SODIUM CHLORIDE, SODIUM LACTATE, POTASSIUM CHLORIDE, AND CALCIUM CHLORIDE: 600; 310; 30; 20 INJECTION, SOLUTION INTRAVENOUS at 10:16

## 2020-05-28 RX ADMIN — MIDAZOLAM HYDROCHLORIDE 2 MG: 1 INJECTION, SOLUTION INTRAMUSCULAR; INTRAVENOUS at 08:26

## 2020-05-28 RX ADMIN — ACETAMINOPHEN 650 MG: 325 TABLET, FILM COATED ORAL at 07:57

## 2020-05-28 RX ADMIN — LIDOCAINE HYDROCHLORIDE 60 MG: 20 INJECTION, SOLUTION EPIDURAL; INFILTRATION; INTRACAUDAL; PERINEURAL at 08:29

## 2020-05-28 RX ADMIN — PROPOFOL 150 MG: 10 INJECTION, EMULSION INTRAVENOUS at 08:29

## 2020-05-28 RX ADMIN — SODIUM CHLORIDE, SODIUM LACTATE, POTASSIUM CHLORIDE, AND CALCIUM CHLORIDE 50 ML/HR: 600; 310; 30; 20 INJECTION, SOLUTION INTRAVENOUS at 07:53

## 2020-05-28 RX ADMIN — Medication 2 MG: at 10:24

## 2020-05-28 RX ADMIN — FENTANYL CITRATE 50 MCG: 50 INJECTION, SOLUTION INTRAMUSCULAR; INTRAVENOUS at 08:35

## 2020-05-28 RX ADMIN — GLYCOPYRROLATE 0.4 MG: 0.2 INJECTION, SOLUTION INTRAMUSCULAR; INTRAVENOUS at 10:24

## 2020-05-28 RX ADMIN — ROCURONIUM BROMIDE 20 MG: 10 SOLUTION INTRAVENOUS at 08:38

## 2020-05-28 RX ADMIN — MEPERIDINE HYDROCHLORIDE 25 MG: 50 INJECTION INTRAMUSCULAR; INTRAVENOUS; SUBCUTANEOUS at 10:44

## 2020-05-28 RX ADMIN — ROCURONIUM BROMIDE 10 MG: 10 SOLUTION INTRAVENOUS at 08:29

## 2020-05-28 RX ADMIN — DEXAMETHASONE SODIUM PHOSPHATE 8 MG: 4 INJECTION, SOLUTION INTRAMUSCULAR; INTRAVENOUS at 08:43

## 2020-05-28 RX ADMIN — SCOPALAMINE 1 PATCH: 1 PATCH, EXTENDED RELEASE TRANSDERMAL at 08:19

## 2020-05-28 RX ADMIN — CLINDAMYCIN IN 5 PERCENT DEXTROSE 900 MG: 18 INJECTION, SOLUTION INTRAVENOUS at 08:34

## 2020-05-28 RX ADMIN — SUCCINYLCHOLINE CHLORIDE 140 MG: 20 INJECTION, SOLUTION INTRAMUSCULAR; INTRAVENOUS at 08:29

## 2020-05-28 RX ADMIN — ONDANSETRON HYDROCHLORIDE 4 MG: 2 INJECTION, SOLUTION INTRAMUSCULAR; INTRAVENOUS at 10:19

## 2020-05-28 RX ADMIN — MIDAZOLAM HYDROCHLORIDE 3 MG: 1 INJECTION, SOLUTION INTRAMUSCULAR; INTRAVENOUS at 08:19

## 2020-05-28 RX ADMIN — ONDANSETRON HYDROCHLORIDE 4 MG: 2 INJECTION, SOLUTION INTRAMUSCULAR; INTRAVENOUS at 10:47

## 2020-05-28 RX ADMIN — FENTANYL CITRATE 100 MCG: 50 INJECTION, SOLUTION INTRAMUSCULAR; INTRAVENOUS at 08:29

## 2020-05-28 NOTE — H&P
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 Please see clinic [facility] for full details. Past medical history:   Past Medical History:   Diagnosis Date    Anemia     Chlamydia 1/8/2014    ESCOBAR II (cervical intraepithelial neoplasia II)     Dysplasia of cervix, low grade (ESCOBAR 1)     Encounter for insertion of mirena IUD 05/25/2018    Mirena placed    Headache       Past surgical history:   Past Surgical History:   Procedure Laterality Date    HX ACL RECONSTRUCTION Left 2008    HX ACL RECONSTRUCTION  2019    HX ADENOIDECTOMY  1994    HX COLPOSCOPY      4/28/10- ecto ESCOBAR 2----4/5/12 ESCOBAR 1---7/18/13-negative    HX GYN      IUD    HX LAP CHOLECYSTECTOMY  10/04/2016    Laparoscopic cholecystectomy by Dr. Ace Gandara.     HX OTHER SURGICAL      HX TONSILLECTOMY  2014      Family history:   Family History   Adopted: Yes   Problem Relation Age of Onset    No Known Problems Mother     No Known Problems Father     Diabetes Maternal Uncle     Hypertension Maternal Grandmother     Gall Bladder Disease Maternal Grandmother     Anesth Problems Neg Hx       Social history:   Social History     Socioeconomic History    Marital status: SINGLE     Spouse name: Not on file    Number of children: Not on file    Years of education: Not on file    Highest education level: Not on file   Occupational History    Not on file   Social Needs    Financial resource strain: Not on file    Food insecurity     Worry: Not on file     Inability: Not on file    Transportation needs     Medical: Not on file     Non-medical: Not on file   Tobacco Use    Smoking status: Former Smoker     Packs/day: 0.25     Years: 0.50     Pack years: 0.12     Last attempt to quit: 2017     Years since quitting: 3.4    Smokeless tobacco: Never Used   Substance and Sexual Activity    Alcohol use: Yes     Comment: social    Drug use: Not Currently     Comment: 2001    Sexual activity: Yes     Partners: Male     Birth control/protection: None, I.U.D. Lifestyle    Physical activity     Days per week: Not on file     Minutes per session: Not on file    Stress: Not on file   Relationships    Social connections     Talks on phone: Not on file     Gets together: Not on file     Attends Faith service: Not on file     Active member of club or organization: Not on file     Attends meetings of clubs or organizations: Not on file     Relationship status: Not on file    Intimate partner violence     Fear of current or ex partner: Not on file     Emotionally abused: Not on file     Physically abused: Not on file     Forced sexual activity: Not on file   Other Topics Concern    Not on file   Social History Narrative    Not on file      Home Medications:   Prior to Admission medications    Medication Sig Start Date End Date Taking? Authorizing Provider   multivitamin (ONE A DAY) tablet Take 1 Tab by mouth daily. Provider, Historical   cholecalciferol, VITAMIN D3, (VITAMIN D3) 5,000 unit tab tablet Take 1 Tab by mouth daily. 11/18/18   Ricardo Heart MD      Allergies: Allergies   Allergen Reactions    Depo-Provera [Medroxyprogesterone] Hives    Reclipsen (28) [Desogestrel-Ethinyl Estradiol] Hives    Sulfa (Sulfonamide Antibiotics) Hives     As child    Pcn [Penicillins] Hives      Review of systems:  Denies headache, fever, chills, weight change, congestion, sore throat, chest pain, shortness of breath, nausea, vomiting, diarrhea, constipation, abdominal pain, generalized weakness, muscle or joint pain, and rash. Physical Exam:  Vitals: Last menstrual period 04/28/2020, not currently breastfeeding. General: awake and alert, NAD  Neck: supple  Cor: RRR  Lungs: clear  Abdomen: soft, non-tender, non-distended.  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 The patient was counseled at length about the risks of nadia Covid-19 during their perioperative period and any recovery window from their procedure. The patient was made aware that nadia Covid-19  may worsen their prognosis for recovering from their procedure and lend to a higher morbidity and/or mortality risk. All material risks, benefits, and reasonable alternatives including postponing the procedure were discussed. The patient does  wish to proceed with the procedure at this time.

## 2020-05-28 NOTE — OP NOTES
[address]   OPERATIVE REPORT    Name:  Esvin Mirza  MR#:  [id number]  :  1990  ACCOUNT #:  [de-identified]  DATE OF SERVICE:  2020    LOCATION:  [address] [place] [address] Operating Room. SERVICE:  Plastic Surgery. PREOPERATIVE DIAGNOSIS:  Cosmetic lipocutaneous dystrophy of the abdomen. POSTOPERATIVE DIAGNOSIS:  Cosmetic lipocutaneous dystrophy of the abdomen. PROCEDURE PERFORMED:  Liposuction with J-plasma of the abdomen. SURGEON:  Katelyn Chaudhry MD    ASSISTANT:  none. ANESTHESIA:  General.    COMPLICATIONS:  None immediate. SPECIMENS REMOVED:  lipoaspirate discardd . IMPLANTS:  none. ESTIMATED BLOOD LOSS:  500 mL. LIPOSUCTION TUMESCENT:  3.5 L. LIPOSUCTION ASPIRATE:  1.75 L, discarded. CUMMINGS:  None. DRAINS:  Two Penrose. BRIEF INDICATIONS:  This is a 80-year-old female status post 2 pregnancies, seeking cosmetic improvement of her abdomen and flank. She is thinking about having additional pregnancy, . Risks and benefits of liposuction and J-plasma discussed thoroughly with her as well as the fact that she does have skin irregularities  bumpiness. Risk of COVID-19 also discussed with her. She was marked in the standing upright position for areas of lipodystrophy of upper and lower abdomen, love handle, flank area. OPERATIVE REPORT:  The patient underwent general anesthesia in supine position. The abdomen and flank were prepped and draped in sterile surgical fashion. Through 5 stab incisions, liposuction tumescent was infiltrated throughout the aforementioned marked areas. 3.5 L in total was used. It was followed by lipoaspiration with 3 and 4 mm cannulas of aforementioned area. Approximately 500 mL from each love handle, flank and other 750 from the abdomen was obtained. After lipoaspiration, J-plasma was used. Approximately 4 passes subcutaneously were performed on all areas with a total of 17 kilowatts of power used.   Approximately 3.5 kilowatts in each flank, 5 kilowatts for the upper abdomen and residual for the lower abdomen. Post J-plasma additional lipoaspiration was performed. Then incisions were closed with 4-0 nylon sutures with 2 medial incisions on the lower abdomen, quarter-inch Penrose were inserted and secured in place. The patient was dressed in a sterile dressing, abdominal binder and transported to PACU for further recovery. All counts were correct.       Lauren Gong MD      [id number]  D:  05/28/2020 10:40  T:  05/28/2020 15:01  JOB #:  [id number]

## 2020-05-28 NOTE — DISCHARGE INSTRUCTIONS
DISCHARGE INSTRUCTIONS     Follow-up with Dr. Conor Galindo in  [place]. Call  [phone]    Remove dressing(s) in 1 days . May Shower (Do not take [place] Roxo)  In  1 days     Activity: No strenous activity, No lifting greater then 10 lbs    Call for: fever, chills, vomiting, foul smelling drainage, bleeding, difficulty breathing, leg cramps  [phone]       Please call [facility] [phone]  to make arrangements from PACU. Quick Reference Instructions for After Liposuction      What to Expect:  o Drainage:   -  Its not uncommon to have a significant amount of light red or yellow color drainage the night following surgery and into the next day. -   I recommend sleeping on a towel or catrina protectors so as protect your bedding.   - Change gauze as needed, may also use maxi pads   o Pain:    - The local anesthetic medicine will most likely wear off about 1-2 hrs after surgery.     - You should take some oral pain medication (dilaudid, Percocet, hydromorphine, oxycondeine) when you get home and before going to bed. Follow the instructions on the bottle. o Swelling:  - Expect to feel swollen, distended and bruised following surgery.  - You will see a change in the area immediately after surgery and the next day following removal of your garment to shower. However, dont be alarmed if the area swells further the following day and even may be assymetric. This is all part of the normal healing process. Most irregularities and swelling will resolve over the following 1-3 months.   - ICE/HEAT:  I do not recommend any ice therapy initially post surgery. You should also AVOID  heating pads as the skin is somewhat numb and can be burned. o Bruising:    - Its not uncommon to have bruising even extending into the genital region. There should not be any very large tense bruises isolated to one area.   If you are concerned called Dr. Mcconnell Killer:  o There are No food restrictions after anesthesia. However, I would recommend eating small bland meals at first and drinking plenty of fluids. o Avoid alcohol for the first 3 days post surgery and while you are taking the pain medications or valium     No Smoking or Nicotine products for 2 weeks post liposuction     Dressing Changes/Wound Care:  o Remove binder and dressings the morning after surgery  o You may use the adhesive lotion remover to remove the surgical prep  o You may gently shower  o Apply Antibiotic ointment to the incisions twice daily  o Cover with 4x4 gauze & paper tape  or Bandaids     Garment/Compression:  o You should keep the garment on the first night of surgery and wear the garment around 22 hours a day.    o It should be snug and comforting, not too tight, not too loose  o You may wear a clean cotton t-shirt under the garment, this helps prevent irritation  o If you notice the garment causing particular creases or pressure on one area of the skin, try to readjust the garment to allow for an even distribution of pressure     Showering:  o You may shower 24 hrs after the surgery  o Do not submerge in water. Do not take a bath or swim until instructed by Dr. Anshul Titus. Typically once sutures are removed.  Activity  o Do not perform any strenuous activity or heavy lifting (greater than 10 lbs) for 2 weeks post surgery. Anything that raises your blood pressure can increase the risk for bleeding.  o Exercise:  you may begin walking anytime after your surgery, light jogging the following week. But you should refrain from serious exercise until cleared by Dr. Anshul Titus (typically 2-3 weeks)     Driving: You may not drive while on pain medication or valium. You should feel good enough that you are confident you can control the vehicle and slam on the brakes if needed.  Medication  o Antibiotics:  - Oral Antibiotics: typically Keflex/ciprofloxin/cephalexin is prescribed.   Take these the night of surgery and continue as instructed on the bottle until the prescription is completed. - Topical Antibiotic:  Either a prescription for bactroban/mupirocin will be written or over the counter Bacitracinshould be applied twice daily to the incisions. o Dilaudid/Hydromorphine/oxycodone/Percocet:   One of these will be prescribe as your pain medication. Typically you can take 1-2 pills every 4-6hrs as needed. See instructions on bottle   o Valium/Diazepam:  may also be prescribed, this medication helps with muscle pain, anxiety and sleep. Some patients find it helps a lot with pain as well. See instructions on the bottle, but usually you may take this every 6 hrs as need. Be careful taking this simultaneously with the pain medication. Initially space out taking the pain medication and valium by about one hour.    o Tylenol:  you may take Tylenol if you are prescribed dilaudid/hydromorphine. Do not exceed 3250 mg of Tylenol in a day and do not take it if prescribed Percocet/oxycodone/hydrocodone   o Ibuprofen/aleve/naproxen/aspirin/advil:  Donot take, until 2 weeks after surgery  o Nausea/Vomiting: This is not typical after awake surgery, but is common after general anesthesia. If you experience this Zofran or Phenergan can be prescribed  o Stool Softener: It is recommended you take a stool softener as the pain medication can cause constipation. I recommend either Senna-S or Colace, two tablets twice daily      Follow Up:  o Typically follow up is around 7-10 days post liposuction  o You can call the office to arrange an appointment     Urgent Issues of Concern:After Hours Paging  [phone]    o For Urgent issues contact Dr. Claudette Stone and [phone] if you believe its a true emergency. o Call with any questions. During the day you can reach us at our office number or email during the day   - [place]:         [phone]  [email or web address]. [facility]  [employer] [place]:  [phone]  [email or web address]  o Urgent Issues: - Fever above 101.5  - Large amounts of bright red blood (more than one gauze pad)  - Difficult Breathing, Chest Pain, Shortness of Breath  - Increased Swelling in the Legs  - Calf or Leg Pain   - Redness or Pus at the Surgical Site           DISCHARGE SUMMARY from Nurse      After general anesthesia or intravenous sedation, for 24 hours or while taking prescription Narcotics:  · Limit your activities  · Do not drive and operate hazardous machinery  · Do not make important personal or business decisions  · Do  not drink alcoholic beverages  · If you have not urinated within 8 hours after discharge, please contact your surgeon on call. Report the following to your surgeon:  · Excessive pain, swelling, redness or odor of or around the surgical area  · Temperature over 100.5  · Nausea and vomiting lasting longer than 4 hours or if unable to take medications  · Any signs of decreased circulation or nerve impairment to extremity: change in color, persistent  numbness, tingling, coldness or increase pain  · Any questions  ___________________________________________________________________________________________________________________________________    Learning About Coronavirus (COVID-19)  Coronavirus (COVID-19): Overview  What is coronavirus (COVID-19)? The coronavirus disease (COVID-19) is caused by a virus. It is an illness that was first found in [place], [place], in December 2019. It has since spread worldwide. The virus can cause fever, cough, and trouble breathing. In severe cases, it can cause pneumonia and make it hard to breathe without help. It can cause death. Coronaviruses are a large group of viruses. They cause the common cold. They also cause more serious illnesses like Middle East respiratory syndrome (MERS) and severe acute respiratory syndrome (SARS). COVID-19 is caused by a novel coronavirus. That means it's a new type that has not been seen in people before.   This virus spreads person-to-person through droplets from coughing and sneezing. It can also spread when you are close to someone who is infected. And it can spread when you touch something that has the virus on it, such as a doorknob or a tabletop. What can you do to protect yourself from coronavirus (COVID-19)? The best way to protect yourself from getting sick is to:  · Avoid areas where there is an outbreak. · Avoid contact with people who may be infected. · Wash your hands often with soap or alcohol-based hand sanitizers. · Avoid crowds and try to stay at least 6 feet away from other people. · Wash your hands often, especially after you cough or sneeze. Use soap and water, and scrub for at least 20 seconds. If soap and water aren't available, use an alcohol-based hand . · Avoid touching your mouth, nose, and eyes. What can you do to avoid spreading the virus to others? To help avoid spreading the virus to others:  · Cover your mouth with a tissue when you cough or sneeze. Then throw the tissue in the trash. · Use a disinfectant to clean things that you touch often. · Stay home if you are sick or have been exposed to the virus. Don't go to school, work, or public areas. And don't use public transportation. · If you are sick:  ? Leave your home only if you need to get medical care. But call the doctor's office first so they know you're coming. And wear a face mask, if you have one.  ? If you have a face mask, wear it whenever you're around other people. It can help stop the spread of the virus when you cough or sneeze. ? Clean and disinfect your home every day. Use household  and disinfectant wipes or sprays. Take special care to clean things that you grab with your hands. These include doorknobs, remote controls, phones, and handles on your refrigerator and microwave. And don't forget countertops, tabletops, bathrooms, and computer keyboards.   When to call for help  Call 911 anytime you think you may need emergency care. For example, call if:  · You have severe trouble breathing. (You can't talk at all.)  · You have constant chest pain or pressure. · You are severely dizzy or lightheaded. · You are confused or can't think clearly. · Your face and lips have a blue color. · You pass out (lose consciousness) or are very hard to wake up. Call your doctor now if you develop symptoms such as:  · Shortness of breath. · Fever. · Cough. If you need to get care, call ahead to the doctor's office for instructions before you go. Make sure you wear a face mask, if you have one, to prevent exposing other people to the virus. Where can you get the latest information? The following health organizations are tracking and studying this virus. Their websites contain the most up-to-date information. Annette Mcclellans also learn what to do if you think you may have been exposed to the virus. · U.S. Centers for Disease Control and Prevention (CDC): The CDC provides updated news about the disease and travel advice. The website also tells you how to prevent the spread of infection. www.cdc.gov  · World Health Organization [facility]): WHO offers information about the virus outbreaks. WHO also has travel advice. www.who.int  Current as of: April 1, 2020               Content Version: 12.4  © 2006-2020 Health[employer]. Care instructions adapted under license by your healthcare professional. If you have questions about a medical condition or this instruction, always ask your healthcare professional. [address] any warranty or liability for your use of this information. Surgical Drain Care: Care Instructions  Your Care Instructions     After a surgery, fluid may collect inside your body in the surgical area. This makes an infection or other problems more likely. A surgical drain allows the fluid to flow out. The doctor puts a thin, flexible rubber tube into the area of your body where the fluid is likely to collect. The rubber tube carries the fluid outside your body. Another type of drain is called a Penrose drain. This type of drain doesn't have a bulb. Instead, the end of the tube is open. That allows the fluid to drain onto a dressing taped to your skin. The drain may be kept in place next to your skin with a stitch or a safety pin in the tube. When you first get the drain, the fluid will be bloody. It will change color from red to pink to a light yellow or clear as the wound heals and the fluid starts to go away. Your doctor may give you information on when you no longer need the drain and when it will be removed. Follow-up care is a key part of your treatment and safety. Be sure to make and go to all appointments, and call your doctor if you are having problems. It's also a good idea to know your test results and keep a list of the medicines you take. How do you change the dressing around your surgical drain? You may have a dressing (bandage). The dressing is often made of gauze pads held on with tape. Your doctor will tell you how often to change it. 1. Wash your hands with soap and water. 2. Take off the dressing from around the drain. 3. Clean the drain site and the skin around it with soap and water. Use gauze or a cotton swab. 4. When the site is dry, put on a new dressing. The way your dressing is put on depends on what kind of drain you have. You will get instructions for your type of drain. 5. Wash your hands again with soap and water. Your doctor may ask you to keep track of your dressing changes. Write down the time of day and the amount and color of the fluid on the dressing. When should you call for help? Call your doctor now or seek immediate medical care if:  · You have signs of infection, such as:  ? Increased pain, swelling, warmth, or redness around the area. ? Red streaks leading from the area. ? Pus draining from the area. ? A fever.   · You see a sudden change in the color or smell of the drainage. · The tube is coming loose where it leaves your skin. Watch closely for changes in your health, and be sure to contact your doctor if:  · You see a lot of fluid around the drain. · You cannot remove a clot from the tube by milking the tube. Where can you learn more? Go to http://ama-alyson.info/  Enter K117 in the search box to learn more about \"Surgical Drain Care: Care Instructions. \"  Current as of: June 26, 2019               Content Version: 12.5  © 5880-9709 [employer]. Care instructions adapted under license by [employer] (which disclaims liability or warranty for this information). If you have questions about a medical condition or this instruction, always ask your healthcare professional. [address] any warranty or liability for your use of this information.

## 2020-05-28 NOTE — ANESTHESIA POSTPROCEDURE EVALUATION
Post-Anesthesia Evaluation and Assessment    Patient: Ector Sharpe MRN: [id number]  SSN: [id number]    YOB: 1990  Age: 34 y.o. Sex: female      I have evaluated the patient and they are stable and ready for discharge from the PACU. Cardiovascular Function/Vital Signs  Visit Vitals  /72   Pulse 81   Temp 37.1 °C (98.7 °F)   Resp 13   Ht 5' 7.5\" (1.715 m)   Wt 83.9 kg (185 lb)   SpO2 100%   BMI 28.55 kg/m²       Patient is status post General anesthesia for Procedure(s):  LIPOSUCTION WITH J PLASMA OF THE ABDOMEN. Nausea/Vomiting: None    Postoperative hydration reviewed and adequate. Pain:  Pain Scale 1: Numeric (0 - 10) (05/28/20 1109)  Pain Intensity 1: 0 (05/28/20 1109)   Managed    Neurological Status:   Neuro (WDL): Within Defined Limits (05/28/20 1109)   At baseline    Mental Status, Level of Consciousness: Alert and  oriented to person, place, and time    Pulmonary Status:   O2 Device: Room air (05/28/20 1109)   Adequate oxygenation and airway patent    Complications related to anesthesia: None    Post-anesthesia assessment completed.  No concerns    Signed By: Samuel Owen MD     May 28, 2020

## 2020-05-28 NOTE — BRIEF OP NOTE
Brief Postoperative Note    Patient: Roxianne Rubinstein  YOB: 1990  MRN: [id number]    Date of Procedure: 5/28/2020     Pre-Op Diagnosis: COSMETIC    Post-Op Diagnosis: Same as preoperative diagnosis. Procedure(s):  LIPOSUCTION WITH J PLASMA OF THE ABDOMEN    Surgeon(s):  Melissa New MD    Surgical Assistant: None    Anesthesia: General     Estimated Blood Loss (mL): less than 623     Complications: None    Specimens: * No specimens in log *     Implants: * No implants in log *    Drains:   Penrose Drain 05/28/20 Right; Lower Abdomen (Active)   Site Assessment Clean, dry, & intact 5/28/2020 10:19 AM   Dressing Status Clean, dry, & intact 5/28/2020 10:19 AM   Drainage Description Serosanguinous 5/28/2020 10:19 AM       Penrose Drain 05/28/20 Left; Lower Abdomen (Active)   Site Assessment Clean, dry, & intact 5/28/2020 10:19 AM   Dressing Status Clean, dry, & intact 5/28/2020 10:19 AM   Drainage Description Serosanguinous 5/28/2020 10:19 AM       Findings: none    Electronically Signed by Chace Pena MD on 5/28/2020 at 10:37 AM

## 2020-05-28 NOTE — PERIOP NOTES
Patient: Minerva Lowe MRN: [id number]  SSN: [id number]   YOB: 1990  Age: 34 y.o. Sex: female     Patient is status post Procedure(s):  LIPOSUCTION WITH J PLASMA OF THE ABDOMEN. Surgeon(s) and Role:     Brandy Dominique MD - Primary    Local/Dose/Irrigation:  See [facility]                  Peripheral IV 05/28/20 Left;Posterior Hand (Active)   Site Assessment Clean, dry, & intact 5/28/2020  7:53 AM   Phlebitis Assessment 0 5/28/2020  7:53 AM   Dressing Status Clean, dry, & intact; New 5/28/2020  7:53 AM   Dressing Type Tape;Transparent 5/28/2020  7:53 AM   Hub Color/Line Status Pink; Infusing 5/28/2020  7:53 AM          Penrose Drain 05/28/20 Right; Lower Abdomen (Active)   Site Assessment Clean, dry, & intact 5/28/2020 10:19 AM   Dressing Status Clean, dry, & intact 5/28/2020 10:19 AM   Drainage Description Serosanguinous 5/28/2020 10:19 AM       Penrose Drain 05/28/20 Left; Lower Abdomen (Active)   Site Assessment Clean, dry, & intact 5/28/2020 10:19 AM   Dressing Status Clean, dry, & intact 5/28/2020 10:19 AM   Drainage Description Serosanguinous 5/28/2020 10:19 AM      Airway - Endotracheal Tube 05/28/20 Oral (Active)                   Dressing/Packing:  Wound Abdomen X 5 liposuction insertion sites-Dressing Type: 4 x 4;ABD binder;ABD pad;Transparent film(5 trocar insertion sites with 4x4 and tegaderm, abds, binder) (05/28/20 1028)    Splint/Cast:  ]    Other:

## 2020-08-27 ENCOUNTER — OFFICE VISIT (OUTPATIENT)
Dept: OBGYN CLINIC | Age: 30
End: 2020-08-27
Payer: MEDICAID

## 2020-08-27 VITALS
SYSTOLIC BLOOD PRESSURE: 118 MMHG | DIASTOLIC BLOOD PRESSURE: 76 MMHG | WEIGHT: 184.4 LBS | BODY MASS INDEX: 27.95 KG/M2 | HEIGHT: 68 IN

## 2020-08-27 DIAGNOSIS — Z01.419 ENCOUNTER FOR WELL WOMAN EXAM WITH ROUTINE GYNECOLOGICAL EXAM: Primary | ICD-10-CM

## 2020-08-27 DIAGNOSIS — Z30.432 ENCOUNTER FOR IUD REMOVAL: ICD-10-CM

## 2020-08-27 DIAGNOSIS — N92.1 BREAKTHROUGH BLEEDING WITH IUD: ICD-10-CM

## 2020-08-27 DIAGNOSIS — Z97.5 BREAKTHROUGH BLEEDING WITH IUD: ICD-10-CM

## 2020-08-27 DIAGNOSIS — Z11.51 ENCOUNTER FOR SCREENING FOR HUMAN PAPILLOMAVIRUS (HPV): ICD-10-CM

## 2020-08-27 PROCEDURE — 99395 PREV VISIT EST AGE 18-39: CPT | Performed by: OBSTETRICS & GYNECOLOGY

## 2020-08-27 PROCEDURE — 58301 REMOVE INTRAUTERINE DEVICE: CPT | Performed by: OBSTETRICS & GYNECOLOGY

## 2020-08-27 RX ORDER — DROSPIRENONE 4 MG/1
1 TABLET, FILM COATED ORAL DAILY
Qty: 3 PACKAGE | Refills: 4 | Status: SHIPPED | OUTPATIENT
Start: 2020-08-27 | End: 2020-11-04 | Stop reason: ALTCHOICE

## 2020-08-27 NOTE — PATIENT INSTRUCTIONS
Well Visit, Ages 25 to 48: Care Instructions Your Care Instructions Physical exams can help you stay healthy. Your doctor has checked your overall health and may have suggested ways to take good care of yourself. He or she also may have recommended tests. At home, you can help prevent illness with healthy eating, regular exercise, and other steps. Follow-up care is a key part of your treatment and safety. Be sure to make and go to all appointments, and call your doctor if you are having problems. It's also a good idea to know your test results and keep a list of the medicines you take. How can you care for yourself at home? · Reach and stay at a healthy weight. This will lower your risk for many problems, such as obesity, diabetes, heart disease, and high blood pressure. · Get at least 30 minutes of physical activity on most days of the week. Walking is a good choice. You also may want to do other activities, such as running, swimming, cycling, or playing tennis or team sports. Discuss any changes in your exercise program with your doctor. · Do not smoke or allow others to smoke around you. If you need help quitting, talk to your doctor about stop-smoking programs and medicines. These can increase your chances of quitting for good. · Talk to your doctor about whether you have any risk factors for sexually transmitted infections (STIs). Having one sex partner (who does not have STIs and does not have sex with anyone else) is a good way to avoid these infections. · Use birth control if you do not want to have children at this time. Talk with your doctor about the choices available and what might be best for you. · Protect your skin from too much sun. When you're outdoors from 10 a.m. to 4 p.m., stay in the shade or cover up with clothing and a hat with a wide brim. Wear sunglasses that block UV rays. Even when it's cloudy, put broad-spectrum sunscreen (SPF 30 or higher) on any exposed skin. · See a dentist one or two times a year for checkups and to have your teeth cleaned. · Wear a seat belt in the car. Follow your doctor's advice about when to have certain tests. These tests can spot problems early. For everyone · Cholesterol. Have the fat (cholesterol) in your blood tested after age 21. Your doctor will tell you how often to have this done based on your age, family history, or other things that can increase your risk for heart disease. · Blood pressure. Have your blood pressure checked during a routine doctor visit. Your doctor will tell you how often to check your blood pressure based on your age, your blood pressure results, and other factors. · Vision. Talk with your doctor about how often to have a glaucoma test. 
· Diabetes. Ask your doctor whether you should have tests for diabetes. · Colon cancer. Your risk for colorectal cancer gets higher as you get older. Some experts say that adults should start regular screening at age 48 and stop at age 76. Others say to start before age 48 or continue after age 76. Talk with your doctor about your risk and when to start and stop screening. For women · Breast exam and mammogram. Talk to your doctor about when you should have a clinical breast exam and a mammogram. Medical experts differ on whether and how often women under 50 should have these tests. Your doctor can help you decide what is right for you. · Cervical cancer screening test and pelvic exam. Begin with a Pap test at age 24. The test often is part of a pelvic exam. Starting at age 27, you may choose to have a Pap test, an HPV test, or both tests at the same time (called co-testing). Talk with your doctor about how often to have testing. · Tests for sexually transmitted infections (STIs). Ask whether you should have tests for STIs. You may be at risk if you have sex with more than one person, especially if your partners do not wear condoms. For men · Tests for sexually transmitted infections (STIs). Ask whether you should have tests for STIs. You may be at risk if you have sex with more than one person, especially if you do not wear a condom. · Testicular cancer exam. Ask your doctor whether you should check your testicles regularly. · Prostate exam. Talk to your doctor about whether you should have a blood test (called a PSA test) for prostate cancer. Experts differ on whether and when men should have this test. Some experts suggest it if you are older than 39 and are -American or have a father or brother who got prostate cancer when he was younger than 72. When should you call for help? Watch closely for changes in your health, and be sure to contact your doctor if you have any problems or symptoms that concern you. Where can you learn more? Go to http://ama-alyson.info/ Enter P072 in the search box to learn more about \"Well Visit, Ages 25 to 48: Care Instructions. \" Current as of: August 22, 2019               Content Version: 12.5 © 4618-9380 [employer]. Care instructions adapted under license by [employer] (which disclaims liability or warranty for this information). If you have questions about a medical condition or this instruction, always ask your healthcare professional. [address] any warranty or liability for your use of this information.

## 2020-08-27 NOTE — PROGRESS NOTES
Mitch Gray is a ,  27 y.o. female [address] whose No LMP recorded. (Menstrual status: IUD). was on  who presents for her annual checkup. She is having constant bleeding and pelvic pain with Mirena. With regard to the Gardisil vaccine, she has not received it yet. Menstrual status:    Her periods are light, minimal to nonexistent in flow. She is using one to two pads or tampons per day, minimal to none using Mirena. She denies dysmenorrhea. She reports no premenstrual symptoms. Contraception:    The current method of family planning is IUD and She declines contraception and counseling. Sexual history:    She  reports being sexually active and has had partner(s) who are Male. She reports using the following methods of birth control/protection: None and I.U.D..    Medical conditions:    Since her last annual GYN exam about two years ago, she has not the following changes in her health history: none. Pap and Mammogram History:    Her most recent Pap smear was 18 normal, HPV not performed     The patient has never had a mammogram.    The patient does not have a family history of breast cancer. Past Medical History:   Diagnosis Date    Anemia     Chlamydia 2014    ESCOBAR II (cervical intraepithelial neoplasia II)     Dysplasia of cervix, low grade (ESCOBAR 1)     Encounter for insertion of mirena IUD 2018    Mirena placed    Headache      Past Surgical History:   Procedure Laterality Date    HX ACL RECONSTRUCTION Left     HX ACL RECONSTRUCTION  2019    HX ADENOIDECTOMY  1994    HX COLPOSCOPY      4/28/10- ecto ESCOBAR 2----12 ESCOBAR 1---13-negative    HX GYN      IUD    HX LAP CHOLECYSTECTOMY  10/04/2016    Laparoscopic cholecystectomy by Dr. Melisa Win.  HX OTHER SURGICAL      HX TONSILLECTOMY         Current Outpatient Medications   Medication Sig Dispense Refill    multivitamin (ONE A DAY) tablet Take 1 Tab by mouth daily.       cholecalciferol, VITAMIN D3, (VITAMIN D3) 5,000 unit tab tablet Take 1 Tab by mouth daily. 90 Tab 1     Allergies: Depo-provera [medroxyprogesterone]; Reclipsen (28) [desogestrel-ethinyl estradiol]; Sulfa (sulfonamide antibiotics); and Pcn [penicillins]   Social History     Socioeconomic History    Marital status: SINGLE     Spouse name: Not on file    Number of children: Not on file    Years of education: Not on file    Highest education level: Not on file   Occupational History    Not on file   Social Needs    Financial resource strain: Not on file    Food insecurity     Worry: Not on file     Inability: Not on file    Transportation needs     Medical: Not on file     Non-medical: Not on file   Tobacco Use    Smoking status: Former Smoker     Packs/day: 0.25     Years: 0.50     Pack years: 0.12     Last attempt to quit: 2017     Years since quitting: 3.6    Smokeless tobacco: Never Used   Substance and Sexual Activity    Alcohol use: Yes     Comment: social    Drug use: Not Currently     Comment: 2001    Sexual activity: Yes     Partners: Male     Birth control/protection: None, I.U.D. Lifestyle    Physical activity     Days per week: Not on file     Minutes per session: Not on file    Stress: Not on file   Relationships    Social connections     Talks on phone: Not on file     Gets together: Not on file     Attends Shinto service: Not on file     Active member of club or organization: Not on file     Attends meetings of clubs or organizations: Not on file     Relationship status: Not on file    Intimate partner violence     Fear of current or ex partner: Not on file     Emotionally abused: Not on file     Physically abused: Not on file     Forced sexual activity: Not on file   Other Topics Concern    Not on file   Social History Narrative    Not on file     Tobacco History:  reports that she quit smoking about 3 years ago. She has a 0.13 pack-year smoking history.  She has never used smokeless tobacco.  Alcohol Abuse:  reports current alcohol use. Drug Abuse:  reports previous drug use. Patient Active Problem List   Diagnosis Code    Environmental allergies Z91.09    Encounter for supervision of other normal pregnancy, first trimester Z29.80    ACL (anterior cruciate ligament) rupture S83.519A       Review of Systems - History obtained from the patient  Constitutional: negative for weight loss, fever, night sweats  HEENT: negative for hearing loss, earache, congestion, snoring, sorethroat  CV: negative for chest pain, palpitations, edema  Resp: negative for cough, shortness of breath, wheezing  GI: negative for change in bowel habits, abdominal pain, black or bloody stools  : negative for frequency, dysuria, hematuria, vaginal discharge  MSK: negative for back pain, joint pain, muscle pain  Breast: negative for breast lumps, nipple discharge, galactorrhea  Skin :negative for itching, rash, hives  Neuro: negative for dizziness, headache, confusion, weakness  Psych: negative for anxiety, depression, change in mood  Heme/lymph: negative for bleeding, bruising, pallor    Physical Exam    There were no vitals taken for this visit.     Constitutional  · Appearance: well-nourished, well developed, alert, in no acute distress    HENT  · Head and Face: appears normal    Neck  · Inspection/Palpation: normal appearance, no masses or tenderness  · Lymph Nodes: no lymphadenopathy present  · Thyroid: gland size normal, nontender, no nodules or masses present on palpation    Chest  · Respiratory Effort: breathing normal  · Auscultation: normal breath sounds    Cardiovascular  · Heart:  · Auscultation: regular rate and rhythm without murmur    Breasts  · Inspection of Breasts: breasts symmetrical, no skin changes, no discharge present, nipple appearance normal, no skin retraction present  · Palpation of Breasts and Axillae: no masses present on palpation, no breast tenderness  · Axillary Lymph Nodes: no lymphadenopathy present    Gastrointestinal  · Abdominal Examination: abdomen non-tender to palpation, normal bowel sounds, no masses present  · Liver and spleen: no hepatomegaly present, spleen not palpable  · Hernias: no hernias identified    Genitourinary  · External Genitalia: normal appearance for age, no discharge present, no tenderness present, no inflammatory lesions present, no masses present, no atrophy present  · Vagina: normal vaginal vault without central or paravaginal defects, no discharge present, no inflammatory lesions present, no masses present  · Bladder: non-tender to palpation  · Urethra: appears normal  · Cervix: normal, string seen   · Uterus: normal size, shape and consistency  · Adnexa: no adnexal tenderness present, no adnexal masses present  · Perineum: perineum within normal limits, no evidence of trauma, no rashes or skin lesions present  · Anus: anus within normal limits, no hemorrhoids present  · Inguinal Lymph Nodes: no lymphadenopathy present    Skin  · General Inspection: no rash, no lesions identified    Neurologic/Psychiatric  · Mental Status:  · Orientation: grossly oriented to person, place and time  · Mood and Affect: mood normal, affect appropriate    . Assessment:  Routine gynecologic examination  Her current medical status is satisfactory with no evidence of significant gynecologic issues.     Plan:  Counseled re: diet, exercise, healthy lifestyle  Return for yearly wellness visits  Pt counseled regarding co-testing for high risk HPV with pap  IUD removed  Start [address] NOTE        Chart reviewed for the following:   Liam FIGUEROA, have reviewed the History, Physical and updated the Allergic reactions for [address] performed immediately prior to start of procedure:   Liam FIGUEROA, have performed the following reviews on [facility] prior to the start of the procedure:            * Patient was identified by name and date of birth   * Agreement on procedure being performed was verified  * Risks and Benefits explained to the patient  * Procedure site verified and marked as necessary  * Patient was positioned for comfort  * Consent was signed and verified     Time: 9:51 AM        Date of procedure: 2020    How tolerated by patient: tolerated the procedure well with no complications    Post Procedural Pain Scale: 2 - Hurts Little Bit    Comments: none    -----------------------------------IUD REMOVAL---------------------  Indications for Removal:  Chloe Felty is a ,  27 y.o. female [address] whose No LMP recorded. (Menstrual status: IUD). was on . who presents today for IUD removal. Her current IUD was placed 18. She has had pelvic pain and bleeding problems with the IUD. She requests removal of the IUD because constant bleeding and pelvic pain. The IUD removal procedure was discussed with the patient and she had no further questions. Procedure: The patient was placed in a dorsal lithotomy position and appropriately draped. On bimanual exam the uterus was anterior and normal in size with no tenderness present. A speculum exam was performed and the cervix was visualized. The cervix was prepped with zephiran solution. The IUD string was visualized. Using ring forceps , the string was grasped and the IUD removed intact. The IUD was shown to the patient.

## 2020-09-01 LAB
CYTOLOGIST CVX/VAG CYTO: NORMAL
CYTOLOGY CVX/VAG DOC CYTO: NORMAL
CYTOLOGY CVX/VAG DOC THIN PREP: NORMAL
CYTOLOGY HISTORY:: NORMAL
DX ICD CODE: NORMAL
HPV I/H RISK 1 DNA CVX QL PROBE+SIG AMP: NEGATIVE
Lab: NORMAL
OTHER STN SPEC: NORMAL
STAT OF ADQ CVX/VAG CYTO-IMP: NORMAL

## 2020-11-04 ENCOUNTER — OFFICE VISIT (OUTPATIENT)
Dept: FAMILY MEDICINE CLINIC | Age: 30
End: 2020-11-04
Payer: MEDICAID

## 2020-11-04 VITALS
WEIGHT: 194 LBS | HEART RATE: 88 BPM | HEIGHT: 68 IN | RESPIRATION RATE: 20 BRPM | OXYGEN SATURATION: 99 % | SYSTOLIC BLOOD PRESSURE: 114 MMHG | BODY MASS INDEX: 29.4 KG/M2 | DIASTOLIC BLOOD PRESSURE: 76 MMHG | TEMPERATURE: 97.3 F

## 2020-11-04 DIAGNOSIS — E55.9 VITAMIN D DEFICIENCY: Primary | ICD-10-CM

## 2020-11-04 DIAGNOSIS — R53.83 FATIGUE, UNSPECIFIED TYPE: ICD-10-CM

## 2020-11-04 DIAGNOSIS — D64.9 ANEMIA, NORMOCYTIC NORMOCHROMIC: ICD-10-CM

## 2020-11-04 DIAGNOSIS — E78.5 DYSLIPIDEMIA: ICD-10-CM

## 2020-11-04 DIAGNOSIS — R35.0 FREQUENCY OF URINATION: ICD-10-CM

## 2020-11-04 DIAGNOSIS — R42 DIZZINESS OF UNKNOWN CAUSE: ICD-10-CM

## 2020-11-04 DIAGNOSIS — Z13.29 SCREENING FOR THYROID DISORDER: ICD-10-CM

## 2020-11-04 DIAGNOSIS — R73.02 IGT (IMPAIRED GLUCOSE TOLERANCE): ICD-10-CM

## 2020-11-04 PROCEDURE — 99214 OFFICE O/P EST MOD 30 MIN: CPT | Performed by: INTERNAL MEDICINE

## 2020-11-04 RX ORDER — MECLIZINE HYDROCHLORIDE 25 MG/1
25 TABLET ORAL
Qty: 20 TAB | Refills: 1 | Status: SHIPPED | OUTPATIENT
Start: 2020-11-04 | End: 2020-11-18 | Stop reason: SDUPTHER

## 2020-11-04 NOTE — PATIENT INSTRUCTIONS
Dizziness: Care Instructions Your Care Instructions Dizziness is the feeling of unsteadiness or fuzziness in your head. It is different than having vertigo, which is a feeling that the room is spinning or that you are moving or falling. It is also different from lightheadedness, which is the feeling that you are about to faint. It can be hard to know what causes dizziness. Some people feel dizzy when they have migraine headaches. Sometimes bouts of flu can make you feel dizzy. Some medical conditions, such as heart problems or high blood pressure, can make you feel dizzy. Many medicines can cause dizziness, including medicines for high blood pressure, pain, or anxiety. If a medicine causes your symptoms, your doctor may recommend that you stop or change the medicine. If it is a problem with your heart, you may need medicine to help your heart work better. If there is no clear reason for your symptoms, your doctor may suggest watching and waiting for a while to see if the dizziness goes away on its own. Follow-up care is a key part of your treatment and safety. Be sure to make and go to all appointments, and call your doctor if you are having problems. It's also a good idea to know your test results and keep a list of the medicines you take. How can you care for yourself at home? · If your doctor recommends or prescribes medicine, take it exactly as directed. Call your doctor if you think you are having a problem with your medicine. · Do not drive while you feel dizzy. · Try to prevent falls. Steps you can take include: ? Using nonskid mats, adding grab bars near the tub, and using night-lights. ? Clearing your home so that walkways are free of anything you might trip on. 
? Letting family and friends know that you have been feeling dizzy. This will help them know how to help you. When should you call for help? Call 911 anytime you think you may need emergency care. For example, call if:   · You passed out (lost consciousness).  
  · You have dizziness along with symptoms of a heart attack. These may include: 
? Chest pain or pressure, or a strange feeling in the chest. 
? Sweating. ? Shortness of breath. ? Nausea or vomiting. ? Pain, pressure, or a strange feeling in the back, neck, jaw, or upper belly or in one or both shoulders or arms. ? Lightheadedness or sudden weakness. ? A fast or irregular heartbeat.  
  · You have symptoms of a stroke. These may include: 
? Sudden numbness, tingling, weakness, or loss of movement in your face, arm, or leg, especially on only one side of your body. ? Sudden vision changes. ? Sudden trouble speaking. ? Sudden confusion or trouble understanding simple statements. ? Sudden problems with walking or balance. ? A sudden, severe headache that is different from past headaches. Call your doctor now or seek immediate medical care if: 
  · You feel dizzy and have a fever, headache, or ringing in your ears.  
  · You have new or increased nausea and vomiting.  
  · Your dizziness does not go away or comes back. Watch closely for changes in your health, and be sure to contact your doctor if: 
  · You do not get better as expected. Where can you learn more? Go to http://ama-alyson.info/ Enter N522 in the search box to learn more about \"Dizziness: Care Instructions. \" Current as of: June 26, 2019               Content Version: 12.6 © 9890-2148 [employer]. Care instructions adapted under license by [employer] (which disclaims liability or warranty for this information). If you have questions about a medical condition or this instruction, always ask your healthcare professional. [place] [address] any warranty or liability for your use of this information. Dizziness: Care Instructions Your Care Instructions Dizziness is the feeling of unsteadiness or fuzziness in your head.  It is different than having vertigo, which is a feeling that the room is spinning or that you are moving or falling. It is also different from lightheadedness, which is the feeling that you are about to faint. It can be hard to know what causes dizziness. Some people feel dizzy when they have migraine headaches. Sometimes bouts of flu can make you feel dizzy. Some medical conditions, such as heart problems or high blood pressure, can make you feel dizzy. Many medicines can cause dizziness, including medicines for high blood pressure, pain, or anxiety. If a medicine causes your symptoms, your doctor may recommend that you stop or change the medicine. If it is a problem with your heart, you may need medicine to help your heart work better. If there is no clear reason for your symptoms, your doctor may suggest watching and waiting for a while to see if the dizziness goes away on its own. Follow-up care is a key part of your treatment and safety. Be sure to make and go to all appointments, and call your doctor if you are having problems. It's also a good idea to know your test results and keep a list of the medicines you take. How can you care for yourself at home? · If your doctor recommends or prescribes medicine, take it exactly as directed. Call your doctor if you think you are having a problem with your medicine. · Do not drive while you feel dizzy. · Try to prevent falls. Steps you can take include: ? Using nonskid mats, adding grab bars near the tub, and using night-lights. ? Clearing your home so that walkways are free of anything you might trip on. 
? Letting family and friends know that you have been feeling dizzy. This will help them know how to help you. When should you call for help? Call 911 anytime you think you may need emergency care. For example, call if: 
  · You passed out (lost consciousness).  
  · You have dizziness along with symptoms of a heart attack. These may include: ? Chest pain or pressure, or a strange feeling in the chest. 
? Sweating. ? Shortness of breath. ? Nausea or vomiting. ? Pain, pressure, or a strange feeling in the back, neck, jaw, or upper belly or in one or both shoulders or arms. ? Lightheadedness or sudden weakness. ? A fast or irregular heartbeat.  
  · You have symptoms of a stroke. These may include: 
? Sudden numbness, tingling, weakness, or loss of movement in your face, arm, or leg, especially on only one side of your body. ? Sudden vision changes. ? Sudden trouble speaking. ? Sudden confusion or trouble understanding simple statements. ? Sudden problems with walking or balance. ? A sudden, severe headache that is different from past headaches. Call your doctor now or seek immediate medical care if: 
  · You feel dizzy and have a fever, headache, or ringing in your ears.  
  · You have new or increased nausea and vomiting.  
  · Your dizziness does not go away or comes back. Watch closely for changes in your health, and be sure to contact your doctor if: 
  · You do not get better as expected. Where can you learn more? Go to http://www.gray.com/ Enter T251 in the search box to learn more about \"Dizziness: Care Instructions. \" Current as of: June 26, 2019               Content Version: 12.6 © 7278-9597 [employer]. Care instructions adapted under license by [employer] (which disclaims liability or warranty for this information). If you have questions about a medical condition or this instruction, always ask your healthcare professional. [place] [address] any warranty or liability for your use of this information.

## 2020-11-04 NOTE — PROGRESS NOTES
Chief Complaint   Patient presents with    Fatigue     X 1 MONTH     Dizziness     check ears     HPI:  Tessa Lindsay is a 27 y.o. AA female with significant medical history noted below presents for follow up. Patient has c/o fatigue ongoing for a month and Dizziness for 2-3 weeks. Patient denies recent URI symptoms, .. Review of Systems  As per hpi    Past Medical History:   Diagnosis Date    Anemia     Chlamydia 1/8/2014    ESCOBAR II (cervical intraepithelial neoplasia II)     Dysplasia of cervix, low grade (ESCOBAR 1)     Encounter for insertion of mirena IUD 05/25/2018    Mirena placed    Encounter for IUD removal 08/27/2020    Headache      Past Surgical History:   Procedure Laterality Date    HX ACL RECONSTRUCTION Left 2008    HX ACL RECONSTRUCTION  2019    HX ADENOIDECTOMY  1994    HX COLPOSCOPY      4/28/10- ecto ESCOBAR 2----4/5/12 ESCOBAR 1---7/18/13-negative    HX GYN      IUD    HX LAP CHOLECYSTECTOMY  10/04/2016    Laparoscopic cholecystectomy by Dr. Jocelyn Choi.  HX OTHER SURGICAL      HX TONSILLECTOMY  2014     Social History     Socioeconomic History    Marital status: SINGLE     Spouse name: Not on file    Number of children: Not on file    Years of education: Not on file    Highest education level: Not on file   Tobacco Use    Smoking status: Former Smoker     Packs/day: 0.25     Years: 0.50     Pack years: 0.12     Last attempt to quit: 2017     Years since quitting: 3.8    Smokeless tobacco: Never Used   Substance and Sexual Activity    Alcohol use: Yes     Comment: social    Drug use: Not Currently     Comment: 2001    Sexual activity: Yes     Partners: Male     Birth control/protection: None, I.U.D.      Family History   Adopted: Yes   Problem Relation Age of Onset    No Known Problems Mother     No Known Problems Father     Diabetes Maternal Uncle     Hypertension Maternal Grandmother     Gall Bladder Disease Maternal Grandmother     Anesth Problems Neg Hx      Current Outpatient Medications   Medication Sig Dispense Refill    multivitamin (ONE A DAY) tablet Take 1 Tab by mouth daily.  cholecalciferol, VITAMIN D3, (VITAMIN D3) 5,000 unit tab tablet Take 1 Tab by mouth daily. 90 Tab 1     Allergies   Allergen Reactions    Depo-Provera [Medroxyprogesterone] Hives    Reclipsen (28) [Desogestrel-Ethinyl Estradiol] Hives    Sulfa (Sulfonamide Antibiotics) Hives     As child    Penicillins Hives and Rash    Sulfamethoxazole-Trimethoprim Rash       Objective:  Visit Vitals  /76   Pulse 88   Temp 97.3 °F (36.3 °C) (Temporal)   Resp 20   Ht 5' 7.5\" (1.715 m)   Wt 194 lb (88 kg)   LMP 11/04/2020   SpO2 99%   BMI 29.94 kg/m²     Physical Exam:   General appearance - alert, well appearing in no distress  Mental status - alert, oriented to person, place, and time  EYE-PERRL, EOMI  ENT-ENT exam normal, no neck nodes or sinus tenderness  Mouth - mucous membranes moist, pharynx normal without lesions  Neck - supple, no significant adenopathy   Chest - clear to auscultation, no wheezes, rales or rhonchi  Heart - normal rate, regular rhythm, no murmurs  Abdomen - soft, nontender, nondistended, no organomegaly  Ext-peripheral pulses normal, no pedal edema  Neuro -no focal findings or movement disorder noted    Assessment/Plan:  Diagnoses and all orders for this visit:    Vitamin D deficiency  -     VITAMIN D, 25 HYDROXY    Anemia, normocytic normochromic  -     CBC W/O DIFF    Screening for thyroid disorder  -     TSH 3RD GENERATION    IGT (impaired glucose tolerance)  -     METABOLIC PANEL, COMPREHENSIVE  -     HEMOGLOBIN A1C WITH EAG    Frequency of urination  -     URINALYSIS W/ RFLX MICROSCOPIC    Dyslipidemia  -     LIPID PANEL    Dizziness of unknown cause  -     METABOLIC PANEL, COMPREHENSIVE  -     CBC W/O DIFF  -     meclizine (ANTIVERT) 25 mg tablet; Take 1 Tab by mouth three (3) times daily as needed for Dizziness for up to 10 days. , Normal, Disp-20 Tab,R-1    Fatigue, unspecified type  -     METABOLIC PANEL, COMPREHENSIVE  -     CBC W/O DIFF      Patient Instructions          Dizziness: Care Instructions  Your Care Instructions  Dizziness is the feeling of unsteadiness or fuzziness in your head. It is different than having vertigo, which is a feeling that the room is spinning or that you are moving or falling. It is also different from lightheadedness, which is the feeling that you are about to faint. It can be hard to know what causes dizziness. Some people feel dizzy when they have migraine headaches. Sometimes bouts of flu can make you feel dizzy. Some medical conditions, such as heart problems or high blood pressure, can make you feel dizzy. Many medicines can cause dizziness, including medicines for high blood pressure, pain, or anxiety. If a medicine causes your symptoms, your doctor may recommend that you stop or change the medicine. If it is a problem with your heart, you may need medicine to help your heart work better. If there is no clear reason for your symptoms, your doctor may suggest watching and waiting for a while to see if the dizziness goes away on its own. Follow-up care is a key part of your treatment and safety. Be sure to make and go to all appointments, and call your doctor if you are having problems. It's also a good idea to know your test results and keep a list of the medicines you take. How can you care for yourself at home? · If your doctor recommends or prescribes medicine, take it exactly as directed. Call your doctor if you think you are having a problem with your medicine. · Do not drive while you feel dizzy. · Try to prevent falls. Steps you can take include:  ? Using nonskid mats, adding grab bars near the tub, and using night-lights. ? Clearing your home so that walkways are free of anything you might trip on.  ? Letting family and friends know that you have been feeling dizzy. This will help them know how to help you.   When should you call for help? Call 911 anytime you think you may need emergency care. For example, call if:    · You passed out (lost consciousness).     · You have dizziness along with symptoms of a heart attack. These may include:  ? Chest pain or pressure, or a strange feeling in the chest.  ? Sweating. ? Shortness of breath. ? Nausea or vomiting. ? Pain, pressure, or a strange feeling in the back, neck, jaw, or upper belly or in one or both shoulders or arms. ? Lightheadedness or sudden weakness. ? A fast or irregular heartbeat.     · You have symptoms of a stroke. These may include:  ? Sudden numbness, tingling, weakness, or loss of movement in your face, arm, or leg, especially on only one side of your body. ? Sudden vision changes. ? Sudden trouble speaking. ? Sudden confusion or trouble understanding simple statements. ? Sudden problems with walking or balance. ? A sudden, severe headache that is different from past headaches. Call your doctor now or seek immediate medical care if:    · You feel dizzy and have a fever, headache, or ringing in your ears.     · You have new or increased nausea and vomiting.     · Your dizziness does not go away or comes back. Watch closely for changes in your health, and be sure to contact your doctor if:    · You do not get better as expected. Where can you learn more? Go to http://www.gray.com/  Enter Q823 in the search box to learn more about \"Dizziness: Care Instructions. \"  Current as of: June 26, 2019               Content Version: 12.6  © 3511-8941 [employer]. Care instructions adapted under license by [employer] (which disclaims liability or warranty for this information). If you have questions about a medical condition or this instruction, always ask your healthcare professional. [address] any warranty or liability for your use of this information.          Dizziness: Care Instructions  Your Care Instructions  Dizziness is the feeling of unsteadiness or fuzziness in your head. It is different than having vertigo, which is a feeling that the room is spinning or that you are moving or falling. It is also different from lightheadedness, which is the feeling that you are about to faint. It can be hard to know what causes dizziness. Some people feel dizzy when they have migraine headaches. Sometimes bouts of flu can make you feel dizzy. Some medical conditions, such as heart problems or high blood pressure, can make you feel dizzy. Many medicines can cause dizziness, including medicines for high blood pressure, pain, or anxiety. If a medicine causes your symptoms, your doctor may recommend that you stop or change the medicine. If it is a problem with your heart, you may need medicine to help your heart work better. If there is no clear reason for your symptoms, your doctor may suggest watching and waiting for a while to see if the dizziness goes away on its own. Follow-up care is a key part of your treatment and safety. Be sure to make and go to all appointments, and call your doctor if you are having problems. It's also a good idea to know your test results and keep a list of the medicines you take. How can you care for yourself at home? · If your doctor recommends or prescribes medicine, take it exactly as directed. Call your doctor if you think you are having a problem with your medicine. · Do not drive while you feel dizzy. · Try to prevent falls. Steps you can take include:  ? Using nonskid mats, adding grab bars near the tub, and using night-lights. ? Clearing your home so that walkways are free of anything you might trip on.  ? Letting family and friends know that you have been feeling dizzy. This will help them know how to help you. When should you call for help? Call 911 anytime you think you may need emergency care.  For example, call if:    · You passed out (lost consciousness).     · You have dizziness along with symptoms of a heart attack. These may include:  ? Chest pain or pressure, or a strange feeling in the chest.  ? Sweating. ? Shortness of breath. ? Nausea or vomiting. ? Pain, pressure, or a strange feeling in the back, neck, jaw, or upper belly or in one or both shoulders or arms. ? Lightheadedness or sudden weakness. ? A fast or irregular heartbeat.     · You have symptoms of a stroke. These may include:  ? Sudden numbness, tingling, weakness, or loss of movement in your face, arm, or leg, especially on only one side of your body. ? Sudden vision changes. ? Sudden trouble speaking. ? Sudden confusion or trouble understanding simple statements. ? Sudden problems with walking or balance. ? A sudden, severe headache that is different from past headaches. Call your doctor now or seek immediate medical care if:    · You feel dizzy and have a fever, headache, or ringing in your ears.     · You have new or increased nausea and vomiting.     · Your dizziness does not go away or comes back. Watch closely for changes in your health, and be sure to contact your doctor if:    · You do not get better as expected. Where can you learn more? Go to http://ama-alyson.info/  Enter Q823 in the search box to learn more about \"Dizziness: Care Instructions. \"  Current as of: June 26, 2019               Content Version: 12.6  © 5550-4060 [employer]. Care instructions adapted under license by [employer] (which disclaims liability or warranty for this information). If you have questions about a medical condition or this instruction, always ask your healthcare professional. [address] any warranty or liability for your use of this information. Follow-up and Dispositions    · Return in about 2 weeks (around 11/18/2020) for f/u treatment and results.

## 2020-11-05 LAB
25(OH)D3+25(OH)D2 SERPL-MCNC: 30 NG/ML (ref 30–100)
ALBUMIN SERPL-MCNC: 4.2 G/DL (ref 3.9–5)
ALBUMIN/GLOB SERPL: 2.1 {RATIO} (ref 1.2–2.2)
ALP SERPL-CCNC: 97 IU/L (ref 39–117)
ALT SERPL-CCNC: 12 IU/L (ref 0–32)
APPEARANCE UR: CLEAR
AST SERPL-CCNC: 21 IU/L (ref 0–40)
BILIRUB SERPL-MCNC: 0.7 MG/DL (ref 0–1.2)
BILIRUB UR QL STRIP: NEGATIVE
BUN SERPL-MCNC: 6 MG/DL (ref 6–20)
BUN/CREAT SERPL: 7 (ref 9–23)
CALCIUM SERPL-MCNC: 8.8 MG/DL (ref 8.7–10.2)
CHLORIDE SERPL-SCNC: 106 MMOL/L (ref 96–106)
CHOLEST SERPL-MCNC: 166 MG/DL (ref 100–199)
CO2 SERPL-SCNC: 24 MMOL/L (ref 20–29)
COLOR UR: YELLOW
CREAT SERPL-MCNC: 0.84 MG/DL (ref 0.57–1)
ERYTHROCYTE [DISTWIDTH] IN BLOOD BY AUTOMATED COUNT: 12.1 % (ref 11.7–15.4)
EST. AVERAGE GLUCOSE BLD GHB EST-MCNC: 94 MG/DL
GLOBULIN SER CALC-MCNC: 2 G/DL (ref 1.5–4.5)
GLUCOSE SERPL-MCNC: 82 MG/DL (ref 65–99)
GLUCOSE UR QL: NEGATIVE
HBA1C MFR BLD: 4.9 % (ref 4.8–5.6)
HCT VFR BLD AUTO: 43.1 % (ref 34–46.6)
HDLC SERPL-MCNC: 54 MG/DL
HGB BLD-MCNC: 14.3 G/DL (ref 11.1–15.9)
HGB UR QL STRIP: NEGATIVE
KETONES UR QL STRIP: NEGATIVE
LDLC SERPL CALC-MCNC: 99 MG/DL (ref 0–99)
LEUKOCYTE ESTERASE UR QL STRIP: NEGATIVE
MCH RBC QN AUTO: 31.7 PG (ref 26.6–33)
MCHC RBC AUTO-ENTMCNC: 33.2 G/DL (ref 31.5–35.7)
MCV RBC AUTO: 96 FL (ref 79–97)
MICRO URNS: NORMAL
NITRITE UR QL STRIP: NEGATIVE
PH UR STRIP: 7 [PH] (ref 5–7.5)
PLATELET # BLD AUTO: 287 X10E3/UL (ref 150–450)
POTASSIUM SERPL-SCNC: 4.3 MMOL/L (ref 3.5–5.2)
PROT SERPL-MCNC: 6.2 G/DL (ref 6–8.5)
PROT UR QL STRIP: NEGATIVE
RBC # BLD AUTO: 4.51 X10E6/UL (ref 3.77–5.28)
SODIUM SERPL-SCNC: 142 MMOL/L (ref 134–144)
SP GR UR: 1.02 (ref 1–1.03)
TRIGL SERPL-MCNC: 64 MG/DL (ref 0–149)
TSH SERPL DL<=0.005 MIU/L-ACNC: 2 UIU/ML (ref 0.45–4.5)
UROBILINOGEN UR STRIP-MCNC: 0.2 MG/DL (ref 0.2–1)
VLDLC SERPL CALC-MCNC: 13 MG/DL (ref 5–40)
WBC # BLD AUTO: 6.7 X10E3/UL (ref 3.4–10.8)

## 2020-11-18 ENCOUNTER — OFFICE VISIT (OUTPATIENT)
Dept: FAMILY MEDICINE CLINIC | Age: 30
End: 2020-11-18
Payer: MEDICAID

## 2020-11-18 VITALS
SYSTOLIC BLOOD PRESSURE: 111 MMHG | BODY MASS INDEX: 29.4 KG/M2 | WEIGHT: 194 LBS | OXYGEN SATURATION: 98 % | RESPIRATION RATE: 16 BRPM | DIASTOLIC BLOOD PRESSURE: 72 MMHG | HEIGHT: 68 IN | TEMPERATURE: 98.4 F | HEART RATE: 96 BPM

## 2020-11-18 DIAGNOSIS — E55.9 VITAMIN D DEFICIENCY: Primary | ICD-10-CM

## 2020-11-18 DIAGNOSIS — H66.93 ACUTE BACTERIAL MIDDLE EAR INFECTION, BILATERAL: ICD-10-CM

## 2020-11-18 DIAGNOSIS — R42 DIZZINESS OF UNKNOWN CAUSE: ICD-10-CM

## 2020-11-18 PROCEDURE — 99214 OFFICE O/P EST MOD 30 MIN: CPT | Performed by: INTERNAL MEDICINE

## 2020-11-18 RX ORDER — MECLIZINE HYDROCHLORIDE 25 MG/1
25 TABLET ORAL
Qty: 20 TAB | Refills: 1 | Status: SHIPPED | OUTPATIENT
Start: 2020-11-18 | End: 2020-11-28

## 2020-11-18 RX ORDER — OFLOXACIN 3 MG/ML
5 SOLUTION AURICULAR (OTIC) DAILY
Qty: 10 ML | Refills: 0 | Status: SHIPPED | OUTPATIENT
Start: 2020-11-18 | End: 2020-12-22

## 2020-11-18 RX ORDER — CHOLECALCIFEROL TAB 125 MCG (5000 UNIT) 125 MCG
5000 TAB ORAL DAILY
Qty: 90 TAB | Refills: 1 | Status: SHIPPED | OUTPATIENT
Start: 2020-11-18 | End: 2020-12-22 | Stop reason: SDUPTHER

## 2020-11-18 NOTE — PROGRESS NOTES
Chief Complaint   Patient presents with    Fatigue     f/u, labs    Dizziness     f/u, labs     1. Have you been to the ER, urgent care clinic since your last visit? Hospitalized since your last visit? No    2. Have you seen or consulted any other health care providers outside of the [address] [place] [address] since your last visit? Include any pap smears or colon screening.  No

## 2020-11-18 NOTE — PROGRESS NOTES
Chief Complaint   Patient presents with    Fatigue     f/u, labs    Dizziness     f/u, labs     HPI:  Kyaw Lyn is a 27 y.o. AA female presents with c/o fatigue and Dizziness  Also, she is here to review lab results. Except for low serum vit D level, results are within normal limits    Review of Systems  As per hpi    Past Medical History:   Diagnosis Date    Anemia     Chlamydia 1/8/2014    ESCOBAR II (cervical intraepithelial neoplasia II)     Dysplasia of cervix, low grade (ECSOBAR 1)     Encounter for insertion of mirena IUD 05/25/2018    Mirena placed    Encounter for IUD removal 08/27/2020    Headache      Past Surgical History:   Procedure Laterality Date    HX ACL RECONSTRUCTION Left 2008    HX ACL RECONSTRUCTION  2019    HX ADENOIDECTOMY  1994    HX COLPOSCOPY      4/28/10- ecto ESCOBAR 2----4/5/12 ESCOBAR 1---7/18/13-negative    HX GYN      IUD    HX LAP CHOLECYSTECTOMY  10/04/2016    Laparoscopic cholecystectomy by Dr. Jerson Machuca.  HX OTHER SURGICAL      HX TONSILLECTOMY  2014     Social History     Socioeconomic History    Marital status: SINGLE     Spouse name: Not on file    Number of children: Not on file    Years of education: Not on file    Highest education level: Not on file   Tobacco Use    Smoking status: Former Smoker     Packs/day: 0.25     Years: 0.50     Pack years: 0.12     Last attempt to quit: 2017     Years since quitting: 3.8    Smokeless tobacco: Never Used   Substance and Sexual Activity    Alcohol use: Yes     Comment: social    Drug use: Not Currently     Comment: 2001    Sexual activity: Yes     Partners: Male     Birth control/protection: None, I.U.D.      Family History   Adopted: Yes   Problem Relation Age of Onset    No Known Problems Mother     No Known Problems Father     Diabetes Maternal Uncle     Hypertension Maternal Grandmother     Gall Bladder Disease Maternal Grandmother     Anesth Problems Neg Hx      Current Outpatient Medications   Medication Sig Dispense Refill    multivitamin (ONE A DAY) tablet Take 1 Tab by mouth daily.  cholecalciferol, VITAMIN D3, (VITAMIN D3) 5,000 unit tab tablet Take 1 Tab by mouth daily. 90 Tab 1     Allergies   Allergen Reactions    Depo-Provera [Medroxyprogesterone] Hives    Reclipsen (28) [Desogestrel-Ethinyl Estradiol] Hives    Sulfa (Sulfonamide Antibiotics) Hives     As child    Penicillins Hives and Rash    Sulfamethoxazole-Trimethoprim Rash     Objective:  Visit Vitals  /72 (BP 1 Location: Right arm, BP Patient Position: Sitting)   Pulse 96   Temp 98.4 °F (36.9 °C) (Temporal)   Resp 16   Ht 5' 7.5\" (1.715 m)   Wt 194 lb (88 kg)   LMP 11/02/2020 (Exact Date)   SpO2 98%   BMI 29.94 kg/m²     Physical Exam:   General appearance - alert, well appearing in no distress  Mental status - alert, oriented to person, place, and time  ENT-inner ear drainage, no neck nodes or sinus tenderness  Chest - clear to auscultation, no wheezes, rales or rhonchi  Heart - normal rate, regular rhythm, no murmurs  Abdomen - soft, nontender, nondistended, no organomegaly  Ext-peripheral pulses normal, no pedal edema  Neuro - no focal findings   Back-full range of motion, no tenderness, palpable spasm or pain on motion     Results for orders placed or performed in visit on 74/43/89   METABOLIC PANEL, COMPREHENSIVE   Result Value Ref Range    Glucose 82 65 - 99 mg/dL    BUN 6 6 - 20 mg/dL    Creatinine 0.84 0.57 - 1.00 mg/dL    GFR est non-AA 94 >59 mL/min/1.73    GFR est  >59 mL/min/1.73    BUN/Creatinine ratio 7 (L) 9 - 23    Sodium 142 134 - 144 mmol/L    Potassium 4.3 3.5 - 5.2 mmol/L    Chloride 106 96 - 106 mmol/L    CO2 24 20 - 29 mmol/L    Calcium 8.8 8.7 - 10.2 mg/dL    Protein, total 6.2 6.0 - 8.5 g/dL    Albumin 4.2 3.9 - 5.0 g/dL    GLOBULIN, TOTAL 2.0 1.5 - 4.5 g/dL    A-G Ratio 2.1 1.2 - 2.2    Bilirubin, total 0.7 0.0 - 1.2 mg/dL    Alk.  phosphatase 97 39 - 117 IU/L    AST (SGOT) 21 0 - 40 IU/L    ALT (SGPT) 12 0 - 32 IU/L   CBC W/O DIFF   Result Value Ref Range    WBC 6.7 3.4 - 10.8 x10E3/uL    RBC 4.51 3.77 - 5.28 x10E6/uL    HGB 14.3 11.1 - 15.9 g/dL    HCT 43.1 34.0 - 46.6 %    MCV 96 79 - 97 fL    MCH 31.7 26.6 - 33.0 pg    MCHC 33.2 31.5 - 35.7 g/dL    RDW 12.1 11.7 - 15.4 %    PLATELET 704 342 - 816 x10E3/uL   LIPID PANEL   Result Value Ref Range    Cholesterol, total 166 100 - 199 mg/dL    Triglyceride 64 0 - 149 mg/dL    HDL Cholesterol 54 >39 mg/dL    VLDL Cholesterol Minor 13 5 - 40 mg/dL    LDL Chol Calc ([facility]) 99 0 - 99 mg/dL   HEMOGLOBIN A1C WITH EAG   Result Value Ref Range    Hemoglobin A1c 4.9 4.8 - 5.6 %    Estimated average glucose 94 mg/dL   TSH 3RD GENERATION   Result Value Ref Range    TSH 2.000 0.450 - 4.500 uIU/mL   VITAMIN D, 25 HYDROXY   Result Value Ref Range    VITAMIN D, 25-HYDROXY 30.0 30.0 - 100.0 ng/mL   URINALYSIS W/ RFLX MICROSCOPIC   Result Value Ref Range    Specific Gravity 1.025 1.005 - 1.030    pH (UA) 7.0 5.0 - 7.5    Color Yellow Yellow    Appearance Clear Clear    Leukocyte Esterase Negative Negative    Protein Negative Negative/Trace    Glucose Negative Negative    Ketone Negative Negative    Blood Negative Negative    Bilirubin Negative Negative    Urobilinogen 0.2 0.2 - 1.0 mg/dL    Nitrites Negative Negative    Microscopic Examination Comment      Assessment/Plan:  Diagnoses and all orders for this visit:    Vitamin D deficiency  -     cholecalciferol (Vitamin D3) (5000 Units/125 mcg) tab tablet; Take 1 Tab by mouth daily. , Normal, Disp-90 Tab,R-1    Dizziness of unknown cause  -     meclizine (ANTIVERT) 25 mg tablet; Take 1 Tab by mouth three (3) times daily as needed for Dizziness for up to 10 days. , Normal, Disp-20 Tab,R-1    Acute bacterial middle ear infection, bilateral  -     ofloxacin (FLOXIN) 0.3 % otic solution; Administer 5 Drops into each ear daily. , Normal, Disp-10 mL,R-0      Patient Instructions        Learning About Vitamin D  Why is it important to get enough vitamin D?    Your body needs vitamin D to absorb calcium. Calcium keeps your bones and muscles, including your heart, healthy and strong. If your muscles don't get enough calcium, they can cramp, hurt, or feel weak. You may have long-term (chronic) muscle aches and pains. If you don't get enough vitamin D throughout life, you have an increased chance of having thin and brittle bones (osteoporosis) in your later years. Children who don't get enough vitamin D may not grow as much as others their age. They also have a chance of getting a rare disease called rickets. It causes weak bones. Vitamin D and calcium are added to many foods. And your body uses sunshine to make its own vitamin D. How much vitamin D do you need? The [place] of Medicine recommends that people ages 3 through 79 get 600 IU (international units) every day. Adults 71 and older need 800 IU every day. Blood tests for vitamin D can check your vitamin D level. But there is no standard normal range used by all laboratories. You're likely getting enough vitamin D if your levels are in the range of 20 to 50 ng/mL. How can you get more vitamin D? Foods that contain vitamin D include:  · [place], tuna, and mackerel. These are some of the best foods to eat when you need to get more vitamin D.  · Cheese, egg yolks, and beef liver. These foods have vitamin D in small amounts. · Milk, soy drinks, orange juice, yogurt, margarine, and some kinds of cereal have vitamin D added to them. Some people don't make vitamin D as well as others. They may have to take extra care in getting enough vitamin D. Things that reduce how much vitamin D your body makes include:  · Dark skin, such as many  Americans have. · Age, especially if you are older than 72. · Digestive problems, such as Crohn's or celiac disease. · Liver and kidney disease. Some people who do not get enough vitamin D may need supplements.   Are there any risks from taking vitamin D?  · Too much vitamin D:  ? Can damage your kidneys. ? Can cause nausea and vomiting, constipation, and weakness. ? Raises the amount of calcium in your blood. If this happens, you can get confused or have an irregular heart rhythm. · Vitamin D may interact with other medicines. Tell your doctor about all of the medicines you take, including over-the-counter drugs, herbs, and pills. Tell your doctor about all of your current medical problems. Where can you learn more? Go to http://www.francisco.com/  Enter J030 in the search box to learn more about \"Learning About Vitamin D.\"  Current as of: August 22, 2019               Content Version: 12.6  © 5727-6537 [employer]. Care instructions adapted under license by [employer] (which disclaims liability or warranty for this information). If you have questions about a medical condition or this instruction, always ask your healthcare professional. [place] [address] any warranty or liability for your use of this information. Follow-up and Dispositions    · Return in about 1 year (around 11/18/2021), or if symptoms worsen or fail to improve, for routine follow up.

## 2020-11-18 NOTE — PATIENT INSTRUCTIONS
Learning About Vitamin D Why is it important to get enough vitamin D? Your body needs vitamin D to absorb calcium. Calcium keeps your bones and muscles, including your heart, healthy and strong. If your muscles don't get enough calcium, they can cramp, hurt, or feel weak. You may have long-term (chronic) muscle aches and pains. If you don't get enough vitamin D throughout life, you have an increased chance of having thin and brittle bones (osteoporosis) in your later years. Children who don't get enough vitamin D may not grow as much as others their age. They also have a chance of getting a rare disease called rickets. It causes weak bones. Vitamin D and calcium are added to many foods. And your body uses sunshine to make its own vitamin D. How much vitamin D do you need? The [place] of Medicine recommends that people ages 3 through 79 get 600 IU (international units) every day. Adults 71 and older need 800 IU every day. Blood tests for vitamin D can check your vitamin D level. But there is no standard normal range used by all laboratories. You're likely getting enough vitamin D if your levels are in the range of 20 to 50 ng/mL. How can you get more vitamin D? Foods that contain vitamin D include: 
· [place], tuna, and mackerel. These are some of the best foods to eat when you need to get more vitamin D. 
· Cheese, egg yolks, and beef liver. These foods have vitamin D in small amounts. · Milk, soy drinks, orange juice, yogurt, margarine, and some kinds of cereal have vitamin D added to them. Some people don't make vitamin D as well as others. They may have to take extra care in getting enough vitamin D. Things that reduce how much vitamin D your body makes include: · Dark skin, such as many  Americans have. · Age, especially if you are older than 72. · Digestive problems, such as Crohn's or celiac disease. · Liver and kidney disease. Some people who do not get enough vitamin D may need supplements. Are there any risks from taking vitamin D? 
· Too much vitamin D: 
? Can damage your kidneys. ? Can cause nausea and vomiting, constipation, and weakness. ? Raises the amount of calcium in your blood. If this happens, you can get confused or have an irregular heart rhythm. · Vitamin D may interact with other medicines. Tell your doctor about all of the medicines you take, including over-the-counter drugs, herbs, and pills. Tell your doctor about all of your current medical problems. Where can you learn more? Go to http://ama-alyson.info/ Enter [id number] in the search box to learn more about \"Learning About Vitamin D.\" 
Current as of: August 22, 2019               Content Version: 12.6 © 5204-8347 [employer]. Care instructions adapted under license by [employer] (which disclaims liability or warranty for this information). If you have questions about a medical condition or this instruction, always ask your healthcare professional. [address] any warranty or liability for your use of this information.

## 2020-12-21 ENCOUNTER — NURSE TRIAGE (OUTPATIENT)
Dept: OTHER | Facility: CLINIC | Age: 30
End: 2020-12-21

## 2020-12-21 NOTE — TELEPHONE ENCOUNTER
Reason for Disposition   Fever present > 3 days (72 hours)    Answer Assessment - Initial Assessment Questions  1. COVID-19 DIAGNOSIS: \"Who made your Coronavirus (COVID-19) diagnosis? \" \"Was it confirmed by a positive lab test?\" If not diagnosed by a HCP, ask \"Are there lots of cases (community spread) where you live? \" (See public health department website, if unsure)      Had test week that negative. Unsure if there is a lot. 2. COVID-19 EXPOSURE: \"Was there any known exposure to COVID before the symptoms began? \" CDC Definition of close contact: within 6 feet (2 meters) for a total of 15 minutes or more over a 24-hour period.  was exposed on Thursday by coworder,  has not been tested. 3. ONSET: \"When did the COVID-19 symptoms start? \"       One week ago (Sunday)    4. WORST SYMPTOM: \"What is your worst symptom? \" (e.g., cough, fever, shortness of breath, muscle aches)      Loss of taste and smell    5. COUGH: \"Do you have a cough? \" If so, ask: \"How bad is the cough? \"        Yes has cough,  Coughs a lot at night. Dry cough    6. FEVER: \"Do you have a fever? \" If so, ask: \"What is your temperature, how was it measured, and when did it start? \"      100 yesterday, hasn't checked today, in car now so cannot check    7. RESPIRATORY STATUS: \"Describe your breathing? \" (e.g., shortness of breath, wheezing, unable to speak)       Not feeling very short of breath    8. BETTER-SAME-WORSE: [facility] you getting better, staying the same or getting worse compared to yesterday? \"  If getting worse, ask, \"In what way? \"      Getting worse    9. HIGH RISK DISEASE: \"Do you have any chronic medical problems? \" (e.g., asthma, heart or lung disease, weak immune system, obesity, etc.)      No    10. PREGNANCY: \"Is there any chance you are pregnant? \" \"When was your last menstrual period? \"        No, LMP just finished yesterday    11. OTHER SYMPTOMS: \"Do you have any other symptoms? \"  (e.g., chills, fatigue, headache, loss of smell or taste, muscle pain, sore throat; new loss of smell or taste especially support the diagnosis of COVID-19)        Chills, headache, loss of smell and taste, fatigue, nose feels congested and is runny, cough    Protocols used: CORONAVIRUS (COVID-19) DIAGNOSED OR SUSPECTED-ADULT-    Pt called with c/o of fever (100 F), cough, loss of taste/smell, fatigue). Partner was recently exposed to Covid, but has not been tested yet. Triage indicates to f/u with PCP within 24 hours. Warm transfer to [facility] to get pt scheduled for Virtual Visit. Care advice provided, patient verbalizes understanding; denies any other questions or concerns; instructed to call back for any new or worsening symptoms    Attention Provider: Thank you for allowing me to participate in the care of your patient. The patient was connected to triage in response to information provided to the [facility]. Please do not respond through this encounter as the response is not directed to a shared pool.

## 2020-12-22 ENCOUNTER — OFFICE VISIT (OUTPATIENT)
Dept: URGENT CARE | Age: 30
End: 2020-12-22
Payer: MEDICAID

## 2020-12-22 ENCOUNTER — VIRTUAL VISIT (OUTPATIENT)
Dept: FAMILY MEDICINE CLINIC | Age: 30
End: 2020-12-22
Payer: MEDICAID

## 2020-12-22 VITALS — TEMPERATURE: 99.2 F | HEART RATE: 86 BPM | OXYGEN SATURATION: 99 % | RESPIRATION RATE: 16 BRPM

## 2020-12-22 DIAGNOSIS — E55.9 VITAMIN D DEFICIENCY: ICD-10-CM

## 2020-12-22 DIAGNOSIS — Z20.822 SUSPECTED COVID-19 VIRUS INFECTION: ICD-10-CM

## 2020-12-22 DIAGNOSIS — Z20.822 EXPOSURE TO COVID-19 VIRUS: Primary | ICD-10-CM

## 2020-12-22 DIAGNOSIS — J20.9 ACUTE BRONCHITIS DUE TO INFECTION: Primary | ICD-10-CM

## 2020-12-22 DIAGNOSIS — R68.89 FLU-LIKE SYMPTOMS: ICD-10-CM

## 2020-12-22 LAB
FLUAV+FLUBV AG NOSE QL IA.RAPID: NEGATIVE
FLUAV+FLUBV AG NOSE QL IA.RAPID: NEGATIVE
VALID INTERNAL CONTROL?: YES

## 2020-12-22 PROCEDURE — 99202 OFFICE O/P NEW SF 15 MIN: CPT | Performed by: NURSE PRACTITIONER

## 2020-12-22 PROCEDURE — 99214 OFFICE O/P EST MOD 30 MIN: CPT | Performed by: INTERNAL MEDICINE

## 2020-12-22 PROCEDURE — 87804 INFLUENZA ASSAY W/OPTIC: CPT | Performed by: NURSE PRACTITIONER

## 2020-12-22 RX ORDER — BENZONATATE 100 MG/1
100 CAPSULE ORAL
Qty: 21 CAP | Refills: 0 | Status: SHIPPED | OUTPATIENT
Start: 2020-12-22 | End: 2020-12-29

## 2020-12-22 RX ORDER — AZITHROMYCIN 250 MG/1
TABLET, FILM COATED ORAL
Qty: 6 TAB | Refills: 0 | Status: SHIPPED | OUTPATIENT
Start: 2020-12-22 | End: 2020-12-27

## 2020-12-22 RX ORDER — ASCORBIC ACID 500 MG
500 TABLET ORAL DAILY
Qty: 30 TAB | Refills: 2 | Status: SHIPPED | OUTPATIENT
Start: 2020-12-22 | End: 2021-10-17 | Stop reason: SDUPTHER

## 2020-12-22 RX ORDER — CHOLECALCIFEROL TAB 125 MCG (5000 UNIT) 125 MCG
5000 TAB ORAL DAILY
Qty: 90 TAB | Refills: 1 | Status: SHIPPED | OUTPATIENT
Start: 2020-12-22 | End: 2021-10-22

## 2020-12-22 NOTE — PROGRESS NOTES
Subjective: (As above and below)       This patient was seen in Flu Clinic at [address] [place] [address] Urgent Care while outdoors at their vehicle due to COVID-19 pandemic with PPE and focused examination in order to decrease community viral transmission. Chief Complaint   Patient presents with    Concern For COVID-19 (Coronavirus)     Pt c/o chills, sore throat, diarrhea, cough, loss in taste & smell, runny nose, fatigue and fever. Possible exposure 4 days ago      Dara Hrady is a 27 y.o. female who presents for evaluation of : chills, sore throat, diarrhea, cough, loss in taste & smell, runny nose, fatigue and fever. Possible exposure 4 days ago)  . Symptom onset 3 days ago . Preceding illness: none. No other identified aggravating or alleviating factors. Symptoms are constant and overall unchanged. = Denies: severe lethargy, SOB, syncope/near syncope, bloody stool, chest pain, chest pain with breathing, labored breathing, severe headache, non-intractable fevers . Recent travel: no  Known Exposure to COVID-19: YES  Known flu or strep contact: no       Review of Systems - negative except as listed above    Reviewed PmHx, RxHx, FmHx, SocHx, AllgHx and updated in chart.   Family History   Adopted: Yes   Problem Relation Age of Onset    No Known Problems Mother     No Known Problems Father     Diabetes Maternal Uncle     Hypertension Maternal Grandmother     Gall Bladder Disease Maternal Grandmother     Anesth Problems Neg Hx        Past Medical History:   Diagnosis Date    Anemia     Chlamydia 1/8/2014    ESCOBAR II (cervical intraepithelial neoplasia II)     Dysplasia of cervix, low grade (ESCOBAR 1)     Encounter for insertion of mirena IUD 05/25/2018    Mirena placed    Encounter for IUD removal 08/27/2020    Headache       Social History     Socioeconomic History    Marital status: SINGLE     Spouse name: Not on file    Number of children: Not on file    Years of education: Not on file  Highest education level: Not on file   Tobacco Use    Smoking status: Former Smoker     Packs/day: 0.25     Years: 0.50     Pack years: 0.12     Quit date: 2017     Years since quitting: 3.9    Smokeless tobacco: Never Used   Substance and Sexual Activity    Alcohol use: Yes     Comment: social    Drug use: Not Currently     Comment: 2001    Sexual activity: Yes     Partners: Male     Birth control/protection: None, I.U.D. Current Outpatient Medications   Medication Sig    azithromycin (ZITHROMAX) 250 mg tablet use as directed    benzonatate (Tessalon Perles) 100 mg capsule Take 1 Cap by mouth three (3) times daily as needed for Cough for up to 7 days.  ascorbic acid, vitamin C, (VITAMIN C) 500 mg tablet Take 1 Tab by mouth daily.  cholecalciferol (Vitamin D3) (5000 Units/125 mcg) tab tablet Take 1 Tab by mouth daily.  multivitamin (ONE A DAY) tablet Take 1 Tab by mouth daily. No current facility-administered medications for this visit. Objective:     Vitals:    12/22/20 0953   Pulse: 86   Resp: 16   Temp: 99.2 °F (37.3 °C)   SpO2: 99%       Physical Exam  General appearance  appears well hydrated and does not appear toxic, no acute distress  Eyes - EOMs intact. Non injected. No scleral icterus   Ears - no external swelling  Nose - nasal sniffling. No purulent drainage  Mouth - OP clear and moist without swelling, exudate or lesion. Mucus membranes moist. Uvula midline. Neck/Lymphatics  trachea midline, full AROM  Chest - Normal breathing effort no wheeze rales or rhonchi bilat. Heart - RRR, no murmurs  Skin - no observable rashes or pallor  Neurologic- alert and oriented x 3  Psychiatric- normal mood, behavior and though content. Abdomen- soft non tender all quadrants. Assessment/ Plan:     1. Exposure to COVID-19 virus    - NOVEL CORONAVIRUS (COVID-19)    2.  Flu-like symptoms    - NOVEL CORONAVIRUS (COVID-19)  - AMB POC ANUP INFLUENZA A/B TEST    Rapid flu test negatvie  No evidence suggesting complication of illness at this time. Will discharge home with close monitoring and follow up. To follow CDC isolation/quarantine guidelines  Supportive home care for mild symptoms advised- maintain adequate fluid intake, lozenges, over the counter Tylenol (for fever, aches, pains, chills), deep breathing exercises  Recommendation for self isolation/quarantine based on current CDC guidelines    Follow up: We have reviewed worsening/concerning signs and symptoms that may warrant seeking immediate care in the ED setting. For other non-severe changes, non-improvement or questions, patient aware to contact PCP office or consider a virtual online medical consultation.         Larry Mays NP

## 2020-12-22 NOTE — LETTER
12/22/2020 11:06 AM 
 
Ms. Ronen Pollard [address] [facility] [address] After Visit Summary Patsy Carmichael Arrant: 1990 CSN: [id number] 
 [birth year]  8:10 [address] [facility] [phone] After Visit Summary Instructions 
from Kang Brock MD 
  
Today's medication changes START taking: 
 ascorbic acid (vitamin C) (VITAMIN C)  
 azithromycin (ZITHROMAX)  
 benzonatate (Tessalon Perles)  Accurate as of December 22, 2020 11:06 AM. Review your updated medication list below.  these medications at [address] 
 
 ascorbic acid (vitamin C)  azithromycin  benzonatate  cholecalciferol Address: [address] [place] [address] Phone: [phone] Return if symptoms worsen or fail to improve, for keep your appointment. Today's Visit You saw Kang Brock MD on Tuesday December 22, 2020. The following issues were addressed: Acute bronchitis due to infection, Suspected COVID-19 virus infection, and Vitamin D deficiency. What's Next You currently have no upcoming appointments scheduled. Reason for Visit Concern For COVID-19 (Coronavirus) Current Immunizations Reviewed on 7/2/2015 Name Date Influenza Vaccine 11/26/2014 Influenza Vaccine [employer]) PF (>6 Mo Flulaval, Fluarix, and >3 Yrs Kenneth, Fluzone [id number]) 10/12/2017 , 1/5/2017 TB Skin Test (PPD) Intradermal 1/5/2017 Tdap 1/24/2018 , 1/5/2017 , 4/14/2015  2:12 PM  
Not reviewed this visit Upcoming Health Maintenance    
Full History Flu Vaccine (1)  Postponed until 6/30/2021 PAP AKA CERVICAL CYTOLOGY (Every 3 Years)  Next due on 8/27/2023 DTaP/Tdap/Td series (4 - Td)  Next due on 1/24/2028 Depression Screening Your response on the Depression Screen - Patient Health Questionnaire (PHQ) is normal. No further evaluation is required other than repeat screening in one year or as needed. 
  
  
Additional Information about your Body Mass Index (BMI) Your BMI as listed above is considered overweight (25.0-29.9). BMI is an estimate of body fat, calculated from your height and weight. The higher your BMI, the greater your risk of heart disease, high blood pressure, type 2 diabetes, stroke, gallstones, arthritis, sleep apnea, and certain cancers. BMI is not perfect. It may overestimate body fat in athletes and people who are more muscular. If your body fat is high you can improve your BMI by decreasing your calorie intake and becoming more physically active.  
  
Learn more at: [email or web address] 
  
 
Dear Dimitrios Motta: 
Thank you for requesting a [employer] account. Our records indicate that you already have an active [employer] account. You can access your account anytime at [email or web address]  
  
Did you know that you can access your hospital and ER discharge instructions at any time in [employer]? You can also review all of your test results from your hospital stay or ER visit. 
  
Additional Information 
  
If you have questions, please visit the Frequently Asked Questions section of the [employer] website at [email or web address] Remember, [employer] is NOT to be used for urgent needs. For medical emergencies, dial 911. 
  
Now available from your iPhone and Android! Changes to Your Medication List 
 
(suggestion)  Accurate as of December 22, 2020 11:06 AM. If you have any questions, ask your nurse or doctor. START taking these medications START taking these medications  
 ascorbic acid (vitamin C) 500 mg tablet Commonly known as: VITAMIN C Started by: Joanna Mckeon MD Take 1 Tab by mouth daily. azithromycin 250 mg tablet Commonly known as: Darnell Pena Started by: Portillo Durán MD use as directed  
 benzonatate 100 mg capsule Commonly known as: Tessalon Perles Started by: Portillo Durán MD Take 1 Cap by mouth three (3) times daily as needed for Cough for up to 7 days. CONTINUE taking these medications CONTINUE taking these medications  
 cholecalciferol (5000 Units/125 mcg) Tab tablet Commonly known as: Vitamin D3 Take 1 Tab by mouth daily. multivitamin tablet Commonly known as: ONE A DAY Take 1 Tab by mouth daily. Sincerely, Portillo Durán MD

## 2020-12-22 NOTE — PROGRESS NOTES
Did a covid-19 on 12/15/2020 at [address] [place] [address] [address] and test come back Negative.  Patient state she was going to [address] [facility] to do a second test.

## 2020-12-22 NOTE — PROGRESS NOTES
Chief Complaint   Patient presents with    Concern For COVID-19 (Coronavirus)     HPI:  Esther Brown is a 27 y.o. female who was seen by synchronous (real-time) audio-video technology on 12/22/2020 for Concern For COVID-19 (Coronavirus)    Assessment & Plan:   Diagnoses and all orders for this visit:    1. Acute bronchitis due to infection  -     azithromycin (ZITHROMAX) 250 mg tablet; use as directed  -     benzonatate (Tessalon Perles) 100 mg capsule; Take 1 Cap by mouth three (3) times daily as needed for Cough for up to 7 days. -     ascorbic acid, vitamin C, (VITAMIN C) 500 mg tablet; Take 1 Tab by mouth daily. 2. Suspected COVID-19 virus infection  -     azithromycin (ZITHROMAX) 250 mg tablet; use as directed  -     benzonatate (Tessalon Perles) 100 mg capsule; Take 1 Cap by mouth three (3) times daily as needed for Cough for up to 7 days. -     ascorbic acid, vitamin C, (VITAMIN C) 500 mg tablet; Take 1 Tab by mouth daily. 3. Vitamin D deficiency  -     cholecalciferol (Vitamin D3) (5000 Units/125 mcg) tab tablet; Take 1 Tab by mouth daily. I spent at least 20 minutes on this visit with this established patient. 712  Subjective:   [address] [facility] is a 27 y.o. AA female is seen for lab review. Except for low serum vit D level, results look good  Also, she has additional complaints of productive cough, fever/chills, loss of smell/taste, fatigue for since last week. She had COVID-19 test. The result was negative. Patient thinks she tested too soon . So she is on her way to [address] [place] [address] for second test.    Prior to Admission medications    Medication Sig Start Date End Date Taking? Authorizing Provider   cholecalciferol (Vitamin D3) (5000 Units/125 mcg) tab tablet Take 1 Tab by mouth daily. 11/18/20  Yes Kanu Cooper MD   multivitamin (ONE A DAY) tablet Take 1 Tab by mouth daily. Yes Provider, Historical   ofloxacin (FLOXIN) 0.3 % otic solution Administer 5 Drops into each ear daily. 11/18/20 12/22/20  Caren Freed MD     Patient Active Problem List   Diagnosis Code    Environmental allergies Z91.09    Encounter for supervision of other normal pregnancy, first trimester Z34.81    ACL (anterior cruciate ligament) rupture S83.519A     Current Outpatient Medications   Medication Sig Dispense Refill    cholecalciferol (Vitamin D3) (5000 Units/125 mcg) tab tablet Take 1 Tab by mouth daily. 90 Tab 1    multivitamin (ONE A DAY) tablet Take 1 Tab by mouth daily. Allergies   Allergen Reactions    Depo-Provera [Medroxyprogesterone] Hives    Reclipsen (28) [Desogestrel-Ethinyl Estradiol] Hives    Sulfa (Sulfonamide Antibiotics) Hives     As child    Penicillins Hives and Rash    Sulfamethoxazole-Trimethoprim Rash     Past Medical History:   Diagnosis Date    Anemia     Chlamydia 1/8/2014    ESCOBAR II (cervical intraepithelial neoplasia II)     Dysplasia of cervix, low grade (ESCOBAR 1)     Encounter for insertion of mirena IUD 05/25/2018    Mirena placed    Encounter for IUD removal 08/27/2020    Headache      Past Surgical History:   Procedure Laterality Date    HX ACL RECONSTRUCTION Left 2008    HX ACL RECONSTRUCTION  2019    HX ADENOIDECTOMY  1994    HX COLPOSCOPY      4/28/10- ecto ESCOBAR 2----4/5/12 ESCOBAR 1---7/18/13-negative    HX GYN      IUD    HX LAP CHOLECYSTECTOMY  10/04/2016    Laparoscopic cholecystectomy by Dr. Conor Delgado.     HX OTHER SURGICAL      HX TONSILLECTOMY  2014     Family History   Adopted: Yes   Problem Relation Age of Onset    No Known Problems Mother     No Known Problems Father     Diabetes Maternal Uncle     Hypertension Maternal Grandmother     Gall Bladder Disease Maternal Grandmother     Anesth Problems Neg Hx      Social History     Tobacco Use    Smoking status: Former Smoker     Packs/day: 0.25     Years: 0.50     Pack years: 0.12     Quit date: 2017     Years since quitting: 3.9    Smokeless tobacco: Never Used   Substance Use Topics    Alcohol use: Yes     Comment: social     ROS:  As per hpi    Objective:     Patient-Reported Vitals 12/22/2020   Patient-Reported Temperature 99.2   Patient-Reported LMP 12/17/2020      General: alert, cooperative, no distress   Mental  status: normal mood, behavior, speech, dress, motor activity, and thought processes, able to follow commands   HENT: NCAT   Neck: no visualized mass   Resp: no respiratory distress   Neuro: no gross deficits   Skin: no discoloration or lesions of concern on visible areas     Additional exam findings: We discussed the expected course, resolution and complications of the diagnosis(es) in detail. Medication risks, benefits, costs, interactions, and alternatives were discussed as indicated. I advised her to contact the office if her condition worsens, changes or fails to improve as anticipated. She expressed understanding with the diagnosis(es) and plan. Olga Chacon, who was evaluated through a patient-initiated, synchronous (real-time) audio-video encounter, and/or her healthcare decision maker, is aware that it is a billable service, with coverage as determined by her insurance carrier. She provided verbal consent to proceed: Yes, and patient identification was verified. It was conducted pursuant to the emergency declaration under the [facility], [address] [place] [address] authority and the [employer] and [employer] General Act. A caregiver was present when appropriate. Ability to conduct physical exam was limited. I was in the office. The patient was at home.       Roselia Amador MD

## 2020-12-24 LAB — SARS-COV-2, NAA: NOT DETECTED

## 2021-01-20 ENCOUNTER — TELEPHONE (OUTPATIENT)
Dept: OBGYN CLINIC | Age: 31
End: 2021-01-20

## 2021-01-20 RX ORDER — DROSPIRENONE 4 MG/1
1 TABLET, FILM COATED ORAL DAILY
Qty: 3 PACKAGE | Refills: 2 | Status: SHIPPED | OUTPATIENT
Start: 2021-01-20 | End: 2021-10-22

## 2021-01-20 NOTE — TELEPHONE ENCOUNTER
rx pended for signature due to allergies        Patient states the reactions are local and no sob.       Patient wanted you to know she has taken the plan B pill early this am    Please advise

## 2021-01-20 NOTE — TELEPHONE ENCOUNTER
Call received at 815am    27year old patient last seen in the office on 8/27/2020    Patient calling to say that she was given a prescription for slynd at her visit ( paper)     Plan:  Counseled re: diet, exercise, healthy lifestyle  Return for yearly wellness visits  Pt counseled regarding co-testing for high risk HPV with pap  IUD removed  Start Slynd       Patient would now like that prescription sent electronically to her pharmacy.     Patient has completed her cycle 1/13/2021 and is wondering when to start the pills      Pharmacy confirmed

## 2021-01-20 NOTE — TELEPHONE ENCOUNTER
Start cycle day 1 - use back up x 1  3 packs with 2 refills of 3  She must use the coupon I gave her or go to [email or web address]

## 2021-01-20 NOTE — TELEPHONE ENCOUNTER
Patient advised of MD sent prescription and reports Lesvia Cruz was 1/9/2021       Instructions were reviewed with patient regarding taking the prescription    Patient verbalized understanding.

## 2021-03-01 NOTE — ANESTHESIA PREPROCEDURE EVALUATION
Relevant Problems   No relevant active problems       Anesthetic History   No history of anesthetic complications            Review of Systems / Medical History  Patient summary reviewed, nursing notes reviewed and pertinent labs reviewed    Pulmonary  Within defined limits                 Neuro/Psych   Within defined limits           Cardiovascular  Within defined limits                     GI/Hepatic/Renal  Within defined limits              Endo/Other  Within defined limits           Other Findings              Physical Exam    Airway  Mallampati: I  TM Distance: 4 - 6 cm  Neck ROM: normal range of motion   Mouth opening: Normal     Cardiovascular  Regular rate and rhythm,  S1 and S2 normal,  no murmur, click, rub, or gallop             Dental  No notable dental hx       Pulmonary  Breath sounds clear to auscultation               Abdominal  GI exam deferred       Other Findings            Anesthetic Plan    ASA: 2  Anesthesia type: general          Induction: Intravenous  Anesthetic plan and risks discussed with: Patient 01-Mar-2021 12:16

## 2021-04-06 NOTE — TELEPHONE ENCOUNTER
Call received at 1:17PM      32year old  38w4d pregnant patient last seen in the office on 18 at which patient states she was 4 cm. Patient calling to report that at 3:30AM she woke up to having a wet panty liner like she peed on her self, patient reports going to work and having had to change her pany liner 2 times. Patient denies vaginal bleeding, ?having contractions, and has back ache and positive fetal movement. Patient placed on the schedule to be seen at 2:10pM. Patient verbalized understanding.     Nurse Loyd Bacon aware of the add on appointment Post-Care Instructions: I reviewed with the patient in detail post-care instructions. Patient is to wear sunprotection, and avoid picking at any of the treated lesions. Pt may apply Vaseline to crusted or scabbing areas. Duration Of Freeze Thaw-Cycle (Seconds): 5 Number Of Freeze-Thaw Cycles: 1 freeze-thaw cycle Consent: The patient's consent was obtained including but not limited to risks of crusting, scabbing, blistering, scarring, darker or lighter pigmentary change, recurrence, incomplete removal and infection. Render Post-Care Instructions In Note?: no Detail Level: Zone

## 2021-05-14 ENCOUNTER — TELEPHONE (OUTPATIENT)
Dept: OBGYN CLINIC | Age: 31
End: 2021-05-14

## 2021-05-14 ENCOUNTER — LAB ONLY (OUTPATIENT)
Dept: OBGYN CLINIC | Age: 31
End: 2021-05-14

## 2021-05-14 DIAGNOSIS — N92.6 MISSED MENSES: ICD-10-CM

## 2021-05-14 DIAGNOSIS — N92.6 IRREGULAR PERIODS: Primary | ICD-10-CM

## 2021-05-14 NOTE — TELEPHONE ENCOUNTER
Patient of [address] [place] [address] Rd    Calling to say that she tested positive for pregnancy on Monday 5/10/21. She said she had a light cycle on 3/21/21-3/25/21. She then had spot bleeding the next month 4/14/21-4/18/21. This month she has totally skipped her cycle. She has no symptoms of pregnancy. She does not know approx if the positive testing is accurate how far she is. What would you suggest, blood work? She was not on any form of birth control except condom usage.

## 2021-05-15 LAB
HCG INTACT+B SERPL-ACNC: 458 MIU/ML
PROGEST SERPL-MCNC: 19.1 NG/ML

## 2021-05-18 ENCOUNTER — LAB ONLY (OUTPATIENT)
Dept: OBGYN CLINIC | Age: 31
End: 2021-05-18

## 2021-05-18 DIAGNOSIS — N92.6 MISSED MENSES: Primary | ICD-10-CM

## 2021-05-19 LAB — HCG INTACT+B SERPL-ACNC: 1845 MIU/ML

## 2021-06-03 ENCOUNTER — ROUTINE PRENATAL (OUTPATIENT)
Dept: OBGYN CLINIC | Age: 31
End: 2021-06-03
Payer: MEDICAID

## 2021-06-03 VITALS — SYSTOLIC BLOOD PRESSURE: 131 MMHG | DIASTOLIC BLOOD PRESSURE: 71 MMHG | BODY MASS INDEX: 30.68 KG/M2 | WEIGHT: 198.8 LBS

## 2021-06-03 DIAGNOSIS — Z34.80 SUPERVISION OF OTHER NORMAL PREGNANCY: Primary | ICD-10-CM

## 2021-06-03 LAB
CHLAMYDIA, EXTERNAL: NEGATIVE
HBSAG, EXTERNAL: NEGATIVE
HIV, EXTERNAL: NON REACTIVE
N. GONORRHEA, EXTERNAL: NEGATIVE
RPR, EXTERNAL: NEGATIVE
VARICELLA, EXTERNAL: REACTIVE

## 2021-06-03 PROCEDURE — 0502F SUBSEQUENT PRENATAL CARE: CPT | Performed by: OBSTETRICS & GYNECOLOGY

## 2021-06-03 NOTE — PATIENT INSTRUCTIONS
Weeks 6 to 10 of Your Pregnancy: Care Instructions  Overview     During the first 6 to 10 weeks of your pregnancy, your body goes through many changes. Your baby grows very quickly, even though you can't feel it yet. You may start to feel different, both in your body and your emotions. Because each pregnancy is unique, there's no right way to feel. You may feel the healthiest you've ever been, or you might feel tired or sick to your stomach (\"morning sickness\"). These early weeks are a time to make healthy choices and to eat the best foods for you and your baby. This is also a good time to think about birth defects testing. These are tests done during pregnancy to look for possible problems with the baby. First-trimester tests for birth defects can be done between 8 and 13 weeks of pregnancy, depending on the test. Talk with your doctor about what kinds of tests are available. Follow-up care is a key part of your treatment and safety. Be sure to make and go to all appointments, and call your doctor if you are having problems. It's also a good idea to know your test results and keep a list of the medicines you take. How can you care for yourself at home? Eat well  · Eat at least 3 meals and 2 healthy snacks every day. Eat fresh, whole foods, including:  ? 7 or more servings of bread, tortillas, cereal, rice, pasta, or oatmeal.  ? 3 or more servings of vegetables, especially leafy green vegetables. ? 2 or more servings of fruits. ? 3 or more servings of milk, yogurt, or cheese. ? 2 or more servings of meat, turkey, chicken, fish, eggs, or dried beans. · Drink plenty of fluids, especially water. Avoid sodas and other sweetened drinks. · Choose foods that have important vitamins for your baby, such as calcium, iron, and folate. ? Dairy products, tofu, canned fish with bones, almonds, broccoli, dark leafy greens, corn tortillas, and fortified orange juice are good sources of calcium. ?  Beef, poultry, liver, spinach, lentils, dried beans, fortified cereals, and dried fruits are rich in iron. ? Dark leafy greens, broccoli, asparagus, liver, fortified cereals, orange juice, peanuts, and almonds are good sources of folate. · Avoid foods that could harm your baby. ? Do not eat raw or undercooked meat, chicken, or fish (such as sushi or raw oysters). ? Do not eat raw eggs or foods that contain raw eggs, such as Caesar dressing. ? Do not eat soft cheeses and unpasteurized dairy foods, such as Brie, feta, or blue cheese. ? Do not eat fish that contains a lot of mercury, such as shark, swordfish, tilefish, or oma mackerel. Do not eat more than 6 ounces of tuna each week. ? Do not eat raw sprouts, especially alfalfa sprouts. ? Cut down on caffeine, such as coffee, tea, and cola. Protect yourself and your baby  · Do not touch hollie litter or cat feces. They can cause an infection that could harm your baby. · High body temperature can be harmful to your baby. So if you want to use a sauna or hot tub, be sure to talk to your doctor about how to use it safely. [place] with morning sickness  · Sip small amounts of water, juices, or shakes. Try drinking between meals, not with meals. · Eat 5 or 6 small meals a day. Try dry toast or crackers when you first get up, and eat breakfast a little later. · Avoid spicy, greasy, and fatty foods. · When you feel sick, open your windows or go for a short walk to get fresh air. · Try nausea wristbands. These help some women. · Tell your doctor if you think your prenatal vitamins make you sick. Where can you learn more? Go to http://www.gray.com/  Enter G112 in the search box to learn more about \"Weeks 6 to 10 of Your Pregnancy: Care Instructions. \"  Current as of: October 8, 2020               Content Version: 12.8  © 2925-6834 [employer].    Care instructions adapted under license by [facility] (which disclaims liability or warranty for this information). If you have questions about a medical condition or this instruction, always ask your healthcare professional. [place] [address] any warranty or liability for your use of this information.

## 2021-06-03 NOTE — PROGRESS NOTES
Current pregnancy history:    Moi Molina is a 27 y.o. female who presents for the evaluation of pregnancy. Patient's last menstrual period was 2021 (exact date). LMP history:  The date of her LMP is  certain. Her last menstrual period was normal and lasted for 4 to 5 days. A urine pregnancy test was positive 2021. She was not on the pill at conception. Based on her LMP, her EDC is 2022 and her EGA is 7 weeks,1 day. Her menstrual cycles are regular and occur approximately every 28 days  and range from 3 to 5 days. The last menses lasted 23-24 the usual number of days. Ultrasound data:  She had an  ultrasound done by the ultrasound tech today which revealed a viable acevedo pregnancy with a gestational age of 9 weeks and 0 days giving an [address] of 2022. Pregnancy symptoms:    Since her LMP she has experienced  urinary frequency, breast tenderness, and nausea. She has not been vomiting over the last few weeks. Associated signs and symptoms which she denies: dysuria, discharge, vaginal bleeding. She states she has not gained weight:     Relevant past pregnancy history:   She has the following pregnancy history: Fifth pregnancy Her last pregnancy was uncomplicated. She has no history of  delivery. Relevant past medical history:(relevant to this pregnancy): noncontributory. Pap/Occupational history:  Last pap smear: 2020 neg    Her occupation is: marketing, real estate. Substance history: negative for alcohol, tobacco and street drugs. Positive for nothing. Exposure history: There are no indoor cat/s in the home. She denies close contact with children on a regular basis. She has had chicken pox or the vaccine in the past.   Patient denies issues with domestic violence.      Genetic Screening/Teratology Counseling: (Includes patient, baby's father, or anyone in either family with:)  3.  Patient's age >/= 28 at [address]?-- no  . 2.  Thalassemia ([facility], [facility], [address], or  background): MCV<80?--no.     3.  Neural tube defect (meningomyelocele, spina bifida, anencephaly)?--no.   4.  Congenital heart defect?--no.  5.  Down syndrome?--no.   6.  Dipesh-Sachs ([employer], [place])?--no.   7.  Canavan's Disease?--no.   8.  Familial Dysautonomia?--no.   9.  Sickle cell disease or trait ()? --no   The patient has not been tested for sickle trait  10. Hemophilia or other blood disorders?--no. 11.  Muscular dystrophy?--no. 12.  Cystic fibrosis?--no. 13.  Krishna's Chorea?--no. 14.  Mental retardation/autism (if yes was person tested for Fragile X)?--no. 15.  Other inherited genetic or chromosomal disorder?--no. 12.  Maternal metabolic disorder (DM, PKU, etc)?--no. 17.  Patient or FOB with a child with a birth defect not listed above?--no.  17a. Patient or FOB with a birth defect themselves?--no. 18.  Recurrent pregnancy loss, or stillbirth?--no. 19.  Any medications since LMP other than prenatal vitamins (include vitamins,  supplements, OTC meds, drugs, alcohol)?--no. 20.  Any other genetic/environmental exposure to discuss?--no. Infection History:  1. Lives with someone with TB or TB exposed?--no.   2.  Patient or partner has history of genital herpes?--no.  3.  Rash or viral illness since LMP?--no.    4.  History of STD (GC, CT, HPV, syphilis, HIV)? --no   5. Other: OTHER?      TV ULTRASOUND PERFORMED  A SINGLE VIABLE 7W0D IUP IS SEEN WITH NORMAL CARDIAC RHYTHM. GESTATIONAL AGE BASED ON TODAYS ULTRASOUND. A NORMAL YOLK SAC IS SEEN. RIGHT OVARY APPEARS TO HAVE A FOLLICULAR CYST. LEFT OVARY APPEARS TO HAVE TWO FOLLICULAR CYSTS. NO FREE FLUID SEEN IN THE CDS.     OB History    Para Term  AB Living   5 3 3 0 1 3   SAB TAB Ectopic Molar Multiple Live Births   1 0 0 0 0 3      # Outcome Date GA Lbr Rei/2nd Weight Sex Delivery Anes PTL Lv   5 Current            4 Term 18 38w6d  6 lb 7.7 oz (2.94 kg) F Vag-Spont None N MARIO      Birth Comments: Mom's name: Chauncey Chaudhari 7.1 @ 4/15/18 0200 (@ 38 HOL)  Apgars 9,9  Induction (possible slow leak in water)      Name: Pat Mcguire: [place]  Apgar5: 9   3 Term 07/03/15 39w3d 15:23 / 00:58 8 lb 3.2 oz (3.72 kg) F VAGINAL DELI EPIDURAL AN N MARIO      Apgar1: 9  Apgar5: 9   2 Term 10/04/10 38w0d 06:00 7 lb (3.175 kg) F VAGINAL DELI  N MARIO   1 SAB                Past Medical History:   Diagnosis Date    Anemia     Chlamydia 2014    ESCOBAR II (cervical intraepithelial neoplasia II)     Dysplasia of cervix, low grade (ESCOBAR 1)     Encounter for insertion of mirena IUD 2018    Mirena placed    Encounter for IUD removal 2020    Headache      Past Surgical History:   Procedure Laterality Date    HX ACL RECONSTRUCTION Left     HX ACL RECONSTRUCTION  2019    HX ADENOIDECTOMY  1994    HX COLPOSCOPY      4/28/10- ecto ESCOBAR 2----12 ESCOBAR 1---13-negative    HX GYN      IUD    HX LAP CHOLECYSTECTOMY  10/04/2016    Laparoscopic cholecystectomy by Dr. Senia Mitchell.  HX OTHER SURGICAL      HX TONSILLECTOMY       Social History     Occupational History    Not on file   Tobacco Use    Smoking status: Former Smoker     Packs/day: 0.25     Years: 0.50     Pack years: 0.12     Quit date:      Years since quittin.4    Smokeless tobacco: Never Used   Substance and Sexual Activity    Alcohol use: Yes     Comment: social    Drug use: Not Currently     Comment:     Sexual activity: Yes     Partners: Male     Birth control/protection: None, I.U.D.      Family History   Adopted: Yes   Problem Relation Age of Onset    No Known Problems Mother     No Known Problems Father     Diabetes Maternal Uncle     Hypertension Maternal Grandmother     Gall Bladder Disease Maternal Grandmother     Anesth Problems Neg Hx        Allergies   Allergen Reactions    Depo-Provera [Medroxyprogesterone] Hives    Reclipsen (29) [Desogestrel-Ethinyl Estradiol] Hives    Sulfa (Sulfonamide Antibiotics) Hives     As child    Penicillins Hives and Rash    Sulfamethoxazole-Trimethoprim Rash     Prior to Admission medications    Medication Sig Start Date End Date Taking? Authorizing Provider   drospirenone, contraceptive, (Slynd) 4 mg (28) tab Take 1 Tab by mouth daily. 1/20/21   Babak Orantes MD   ascorbic acid, vitamin C, (VITAMIN C) 500 mg tablet Take 1 Tab by mouth daily. 12/22/20   Paige Martin MD   cholecalciferol (Vitamin D3) (5000 Units/125 mcg) tab tablet Take 1 Tab by mouth daily. 12/22/20   Paige Martin MD   multivitamin (ONE A DAY) tablet Take 1 Tab by mouth daily.     Provider, Historical        Review of Systems: History obtained from the patient  Constitutional: negative for weight loss, fever, night sweats  HEENT: negative for hearing loss, earache, congestion, snoring, sorethroat  CV: negative for chest pain, palpitations, edema  Resp: negative for cough, shortness of breath, wheezing  Breast: negative for breast lumps, nipple discharge, galactorrhea  GI: negative for change in bowel habits, abdominal pain, black or bloody stools  : negative for frequency, dysuria, hematuria, vaginal discharge  MSK: negative for back pain, joint pain, muscle pain  Skin: negative for itching, rash, hives  Neuro: negative for dizziness, headache, confusion, weakness  Psych: negative for anxiety, depression, change in mood  Heme/lymph: negative for bleeding, bruising, pallor    Objective:  Visit Vitals  /71   Wt 198 lb 12.8 oz (90.2 kg)   LMP 04/14/2021 (Exact Date)   BMI 30.68 kg/m²       Physical Exam:   PHYSICAL EXAMINATION    Constitutional  · Appearance: well-nourished, well developed, alert, in no acute distress    HENT  · Head  · Face: appears normal  · Eyes: appear normal  · Ears: normal  · Mouth: normal  · Lips: no lesions    Neck  · Inspection/Palpation: normal appearance, no masses or tenderness  · Lymph Nodes: no lymphadenopathy present  · Thyroid: gland size normal, nontender, no nodules or masses present on palpation    Chest  · Respiratory Effort: breathing unlabored  · Auscultation: normal breath sounds    Cardiovascular  · Heart:  · Auscultation: regular rate and rhythm without murmur    Breasts  · Inspection of Breasts: breasts symmetrical, no skin changes, no discharge present, nipple appearance normal, no skin retraction present  · Palpation of Breasts and Axillae: no masses present on palpation, no breast tenderness  · Axillary Lymph Nodes: no lymphadenopathy present    Gastrointestinal  · Abdominal Examination: abdomen non-tender to palpation, normal bowel sounds, no masses present  · Liver and spleen: no hepatomegaly present, spleen not palpable  · Hernias: no hernias identified    Genitourinary  · External Genitalia: normal appearance for age, no discharge present, no tenderness present, no inflammatory lesions present, no masses present, no atrophy present  · Vagina: normal vaginal vault without central or paravaginal defects, no discharge present, no inflammatory lesions present, no masses present  · Bladder: non-tender to palpation  · Urethra: appears normal  · Cervix: normal   · Uterus: enlarged, normal shape, soft  · Adnexa: no adnexal tenderness present, no adnexal masses present  · Perineum: perineum within normal limits, no evidence of trauma, no rashes or skin lesions present  · Anus: anus within normal limits, no hemorrhoids present  · Inguinal Lymph Nodes: no lymphadenopathy present    Skin  · General Inspection: no rash, no lesions identified    Neurologic/Psychiatric  · Mental Status:  · Orientation: grossly oriented to person, place and time  · Mood and Affect: mood normal, affect appropriate    Assessment:   Intrauterine pregnancy with the following problems identified:   [facility] [place] 1/19/2022 by D=US (ACOG)  NIPTS  [facility]  ?covid shot          Plan:     Offered CF testing, CVS, Nuchal Translucency, MSAFP, amnio, and discussed NIPT  Course of pregnancy discussed including visit schedule, routine U/S, glucola testing, etc.  Avoid alcoholic beverages and illicit/recreational drugs use  Take prenatal vitamins or folic acid daily. Hospital and practice style discussed with coverage system. Discussed nutrition, toxoplasmosis precautions, sexual activity, exercise, need for influenza vaccine, environmental and work hazards, travel advice, screen for domestic violence, need for seat belts. Discussed seafood, unpasteurized dairy products, deli meat, artificial sweeteners, and caffeine. Information on prenatal classes/breastfeeding given. Information on circumcision given  Patient encouraged not to smoke. Discussed current prescription drug use. Given medication list.  Discussed the use of over the counter medications and chemicals. Route of delivery discussed, including risks, benefits, and alternatives of  versus repeat LTCS. Pt understands risk of hemorrhage during pregnancy and post delivery and would accept blood products if necessary in life-threatening emergencies      Handouts given to pt.

## 2021-06-04 LAB
ABO + RH BLD: NORMAL
BLOOD BANK CMNT PATIENT-IMP: NORMAL
BLOOD GROUP ANTIBODIES SERPL: NORMAL
ERYTHROCYTE [DISTWIDTH] IN BLOOD BY AUTOMATED COUNT: 12 % (ref 11.5–14.5)
HBV SURFACE AG SER QL: <0.1 INDEX
HBV SURFACE AG SER QL: NEGATIVE
HCT VFR BLD AUTO: 40.1 % (ref 35–47)
HGB BLD-MCNC: 12.9 G/DL (ref 11.5–16)
HIV 1+2 AB+HIV1 P24 AG SERPL QL IA: NONREACTIVE
HIV12 RESULT COMMENT, HHIVC: NORMAL
MCH RBC QN AUTO: 31.9 PG (ref 26–34)
MCHC RBC AUTO-ENTMCNC: 32.2 G/DL (ref 30–36.5)
MCV RBC AUTO: 99.3 FL (ref 80–99)
NRBC # BLD: 0 K/UL (ref 0–0.01)
NRBC BLD-RTO: 0 PER 100 WBC
PLATELET # BLD AUTO: 286 K/UL (ref 150–400)
PMV BLD AUTO: 9.6 FL (ref 8.9–12.9)
RBC # BLD AUTO: 4.04 M/UL (ref 3.8–5.2)
RUBV IGG SER-IMP: REACTIVE
RUBV IGG SERPL IA-ACNC: >500 IU/ML
SPECIMEN EXP DATE BLD: NORMAL
WBC # BLD AUTO: 10.7 K/UL (ref 3.6–11)

## 2021-06-05 LAB
BACTERIA UR CULT: NORMAL
C TRACH RRNA SPEC QL NAA+PROBE: NEGATIVE
N GONORRHOEA RRNA SPEC QL NAA+PROBE: NEGATIVE
SPECIMEN STATUS REPORT, ROLRST: NORMAL
T VAGINALIS DNA SPEC QL NAA+PROBE: NEGATIVE
TREPONEMA PALLIDUM IGG+IGM AB [PRESENCE] IN SERUM OR PLASMA BY IMMUNOASSAY: NON REACTIVE

## 2021-06-06 LAB — RUBELLA, EXTERNAL: NORMAL

## 2021-06-08 PROBLEM — Z34.80 SUPERVISION OF OTHER NORMAL PREGNANCY: Status: ACTIVE | Noted: 2021-06-08

## 2021-07-01 ENCOUNTER — ROUTINE PRENATAL (OUTPATIENT)
Dept: OBGYN CLINIC | Age: 31
End: 2021-07-01
Payer: MEDICAID

## 2021-07-01 VITALS — DIASTOLIC BLOOD PRESSURE: 78 MMHG | BODY MASS INDEX: 30.71 KG/M2 | SYSTOLIC BLOOD PRESSURE: 128 MMHG | WEIGHT: 199 LBS

## 2021-07-01 DIAGNOSIS — Z36.0 ENCOUNTER FOR ANTENATAL SCREENING FOR CHROMOSOMAL ANOMALIES: ICD-10-CM

## 2021-07-01 DIAGNOSIS — Z34.81 ENCOUNTER FOR SUPERVISION OF OTHER NORMAL PREGNANCY, FIRST TRIMESTER: ICD-10-CM

## 2021-07-01 DIAGNOSIS — Z34.80 SUPERVISION OF OTHER NORMAL PREGNANCY: Primary | ICD-10-CM

## 2021-07-01 DIAGNOSIS — Z34.80 SUPERVISION OF OTHER NORMAL PREGNANCY: ICD-10-CM

## 2021-07-01 PROCEDURE — 0502F SUBSEQUENT PRENATAL CARE: CPT | Performed by: OBSTETRICS & GYNECOLOGY

## 2021-07-01 RX ORDER — ONDANSETRON 8 MG/1
8 TABLET, ORALLY DISINTEGRATING ORAL
Qty: 30 TABLET | Refills: 4 | Status: SHIPPED | OUTPATIENT
Start: 2021-07-01 | End: 2022-06-15 | Stop reason: ALTCHOICE

## 2021-07-01 NOTE — PROGRESS NOTES
Problem List  Date Reviewed: 6/3/2021        Codes Class Noted    Supervision of other normal pregnancy ICD-10-CM: Z34.80  ICD-9-CM: V22.1  6/8/2021    Overview Addendum 6/11/2021 10:19 AM by Anisha Kincaid     Intrauterine pregnancy with the following problems identified:   EDC 1/19/2022 by D=US (ACOG)  Richard Stover- neg  ?covid shot             ACL (anterior cruciate ligament) rupture ICD-10-CM: O60.720M  ICD-9-CM: 844.2  12/2/2019        Environmental allergies ICD-10-CM: Z91.09  ICD-9-CM: V15.09  3/23/2012

## 2021-07-13 ENCOUNTER — TELEPHONE (OUTPATIENT)
Dept: OBGYN CLINIC | Age: 31
End: 2021-07-13

## 2021-07-13 NOTE — TELEPHONE ENCOUNTER
Call received at 318Pm    27year old patient last seen in the office on 2021    Patient is  15 w6d pregnant    Patient calling back to ask about NIPTS testing and that she is not scheduled for ultrasound    Patient advised that she had the Panorama done and will not have another ultrasound till 20 weeks    Patient advised that panorama testing is still pending    Patient verbalized understanding.

## 2021-07-21 ENCOUNTER — TELEPHONE (OUTPATIENT)
Dept: OBGYN CLINIC | Age: 31
End: 2021-07-21

## 2021-07-21 NOTE — TELEPHONE ENCOUNTER
Discussed recommendations from [facility] [place] with patient. She said she is really going to work on getting sips of fluids down and make sure she stays hydrated. If she feels absolutely dry and wiped out she will consider fluids. She will call if things worsen instead of improves. Will call if any cramping or bleeding.

## 2021-07-21 NOTE — TELEPHONE ENCOUNTER
This nurse attempted to reach the patient and was not able to leave a voice mail message due to voice mail box is full    My chart message sent to the patient

## 2021-07-21 NOTE — TELEPHONE ENCOUNTER
Call received at 11:45am      27year old patient  14w0d pregnant. Patient denies vaginal bleeding and cramping. Starting last night the nausea has increase and she is has vomited about 9 time. Patient is also having diarrhea 6 times so far. Patient reports she is not able to keep anything down and it keep right back out. Patient states she is voiding but it is getting less and she has a headache and is not feeling well. Patient has take zofran in the past and it was not really helping and has not taken it for about a week    Patient just a bit ago took a unisom and is seeing if that might help    Patient wanted you to know that she did get covid 19 tested and it was negative    Patient is wondering at what point does she need to get fluids.     Patient is 40 minutes from the office    Please advise how patient to proceed    Thank you

## 2021-07-21 NOTE — TELEPHONE ENCOUNTER
This does not sound pregnancy related if she also has diarrhea. She has a GI bug. Usually 24 hours  Sips of clear liquids only, no dairy, BRAT diet once vomiting subsides  Will not hurt baby. Needs IV fluids only if she would go while not pregnant. If she has peed today she is okay. Rehydrate once symptoms improve.  For now just sips does not need anything else

## 2021-08-05 ENCOUNTER — ROUTINE PRENATAL (OUTPATIENT)
Dept: OBGYN CLINIC | Age: 31
End: 2021-08-05
Payer: MEDICAID

## 2021-08-05 VITALS — SYSTOLIC BLOOD PRESSURE: 126 MMHG | BODY MASS INDEX: 31.94 KG/M2 | DIASTOLIC BLOOD PRESSURE: 83 MMHG | WEIGHT: 207 LBS

## 2021-08-05 DIAGNOSIS — Z34.80 SUPERVISION OF OTHER NORMAL PREGNANCY: Primary | ICD-10-CM

## 2021-08-05 LAB
AFP, MATERNAL, EXTERNAL: POSITIVE
HEPATITIS C AB,   EXT: NEGATIVE

## 2021-08-05 PROCEDURE — 0502F SUBSEQUENT PRENATAL CARE: CPT | Performed by: OBSTETRICS & GYNECOLOGY

## 2021-08-05 NOTE — PROGRESS NOTES
Problem List  Date Reviewed: 7/1/2021        Codes Class Noted    Supervision of other normal pregnancy ICD-10-CM: Z34.80  ICD-9-CM: V22.1  6/8/2021    Overview Addendum 7/16/2021  9:04 AM by Lazarus Camera     Intrauterine pregnancy with the following problems identified:   EDC 1/19/2022 by D=US (ACOG)  NIPTS- normal  [facility]- neg  ?covid shot             ACL (anterior cruciate ligament) rupture ICD-10-CM: Q57.579V  ICD-9-CM: 844.2  12/2/2019        Environmental allergies ICD-10-CM: Z91.09  ICD-9-CM: V15.09  3/23/2012            Prenatal labs have been reviewed

## 2021-08-11 LAB
AFP ADJ MOM SERPL: 2.86
AFP INTERP SERPL-IMP: ABNORMAL
AFP INTERP SERPL-IMP: ABNORMAL
AFP SERPL-MCNC: 88.7 NG/ML
AGE AT DELIVERY: 31.4 YR
COMMENT, 018013: ABNORMAL
GA METHOD: ABNORMAL
GA: 16 WEEKS
HCV AB S/CO SERPL IA: <0.1 S/CO RATIO (ref 0–0.9)
IDDM PATIENT QL: NO
MULTIPLE PREGNANCY: NO
NEURAL TUBE DEFECT RISK FETUS: 299 %
RESULTS, 017004: ABNORMAL

## 2021-08-12 ENCOUNTER — TELEPHONE (OUTPATIENT)
Dept: OBGYN CLINIC | Age: 31
End: 2021-08-12

## 2021-08-12 ENCOUNTER — ROUTINE PRENATAL (OUTPATIENT)
Dept: OBGYN CLINIC | Age: 31
End: 2021-08-12
Payer: MEDICAID

## 2021-08-12 VITALS
DIASTOLIC BLOOD PRESSURE: 73 MMHG | BODY MASS INDEX: 31.63 KG/M2 | SYSTOLIC BLOOD PRESSURE: 123 MMHG | WEIGHT: 205 LBS | HEART RATE: 92 BPM

## 2021-08-12 DIAGNOSIS — Z3A.17 17 WEEKS GESTATION OF PREGNANCY: ICD-10-CM

## 2021-08-12 DIAGNOSIS — Z34.80 SUPERVISION OF OTHER NORMAL PREGNANCY: Primary | ICD-10-CM

## 2021-08-12 DIAGNOSIS — Z34.80 SUPERVISION OF OTHER NORMAL PREGNANCY: ICD-10-CM

## 2021-08-12 DIAGNOSIS — O46.90 VAGINAL BLEEDING IN PREGNANCY: Primary | ICD-10-CM

## 2021-08-12 PROCEDURE — 0502F SUBSEQUENT PRENATAL CARE: CPT | Performed by: OBSTETRICS & GYNECOLOGY

## 2021-08-12 NOTE — PROGRESS NOTES
EXTRA VISIT:  TH pt. Has had spotting x1.5wks. IC a couple of days ago, no change in bleeding. Not having sgnf cramping. PE: small-to-moderate amount tan discharge c/w old blood. cvx closed. Offered to send Nuswab, would like sent -> Nuswab plus. US shows posterior placenta low-lying vs partial previa.  => pelvic rest; keep f/u as scheduled.

## 2021-08-12 NOTE — TELEPHONE ENCOUNTER
Spoke with patient. Pt placed on DM schedule today at 10:10am.  FW reviewed positive AFP results. Discussed results with patient.  Referred to [facility] scheduled 8/18-Pt advised

## 2021-08-12 NOTE — TELEPHONE ENCOUNTER
Call received @0748   17w1d  [address] [facility] Pt    Pt calling with c/o vaginal spotting that has been intermittent for about 1.5 weeks now. Pt states she only sees a light amount of brown blood when wiping. Pt denies any pain or cramping. Pt states she had sexual intercourse 2 days ago, with no change to the bleeding. Pt states she also has been increasing her PO fluid intake to see if that helps. This RN advised pt to continue with increasing PO fluids and will consult with MD for any other recommendations. Pt also would like for MD to speak with her about her positive results for  Contains abnormal data AFP, MATERNAL SCREEN  Pt next appt-    ? OV sooner? Recommendations?     Please advise

## 2021-08-12 NOTE — PROGRESS NOTES
Added to chart, problem list.   Results reviewed by Dr. Delos Goldberg. Pt notified of AFP results.   Scheduled with [facility] 8/18

## 2021-08-15 LAB
A VAGINAE DNA VAG QL NAA+PROBE: ABNORMAL SCORE
BVAB2 DNA VAG QL NAA+PROBE: ABNORMAL SCORE
C ALBICANS DNA VAG QL NAA+PROBE: POSITIVE
C GLABRATA DNA VAG QL NAA+PROBE: NEGATIVE
C TRACH DNA VAG QL NAA+PROBE: NEGATIVE
MEGA1 DNA VAG QL NAA+PROBE: ABNORMAL SCORE
N GONORRHOEA DNA VAG QL NAA+PROBE: NEGATIVE
T VAGINALIS DNA VAG QL NAA+PROBE: NEGATIVE

## 2021-08-18 ENCOUNTER — HOSPITAL ENCOUNTER (OUTPATIENT)
Dept: PERINATAL CARE | Age: 31
Discharge: HOME | End: 2021-08-18
Attending: OBSTETRICS & GYNECOLOGY
Payer: MEDICAID

## 2021-08-18 PROCEDURE — 76811 OB US DETAILED SNGL FETUS: CPT | Performed by: OBSTETRICS & GYNECOLOGY

## 2021-08-20 NOTE — PROGRESS NOTES
Added to prenatal problem list- spoke with MFM, clot above cervix addressed at visit, pt scheduled for 10/2021

## 2021-09-02 ENCOUNTER — ROUTINE PRENATAL (OUTPATIENT)
Dept: OBGYN CLINIC | Age: 31
End: 2021-09-02

## 2021-09-02 VITALS — BODY MASS INDEX: 31.94 KG/M2 | WEIGHT: 207 LBS | SYSTOLIC BLOOD PRESSURE: 121 MMHG | DIASTOLIC BLOOD PRESSURE: 70 MMHG

## 2021-09-02 DIAGNOSIS — O09.92 SUPERVISION OF HIGH RISK PREGNANCY IN SECOND TRIMESTER: Primary | ICD-10-CM

## 2021-09-02 DIAGNOSIS — R77.2 ELEVATED AFP: ICD-10-CM

## 2021-09-02 PROCEDURE — 0502F SUBSEQUENT PRENATAL CARE: CPT | Performed by: OBSTETRICS & GYNECOLOGY

## 2021-09-30 ENCOUNTER — ROUTINE PRENATAL (OUTPATIENT)
Dept: OBGYN CLINIC | Age: 31
End: 2021-09-30
Payer: MEDICAID

## 2021-09-30 VITALS — WEIGHT: 215 LBS | DIASTOLIC BLOOD PRESSURE: 66 MMHG | SYSTOLIC BLOOD PRESSURE: 112 MMHG | BODY MASS INDEX: 33.18 KG/M2

## 2021-09-30 DIAGNOSIS — R77.2 ELEVATED AFP: ICD-10-CM

## 2021-09-30 DIAGNOSIS — Z3A.24 24 WEEKS GESTATION OF PREGNANCY: ICD-10-CM

## 2021-09-30 DIAGNOSIS — O09.92 SUPERVISION OF HIGH RISK PREGNANCY IN SECOND TRIMESTER: Primary | ICD-10-CM

## 2021-09-30 PROCEDURE — 0502F SUBSEQUENT PRENATAL CARE: CPT | Performed by: OBSTETRICS & GYNECOLOGY

## 2021-09-30 NOTE — PROGRESS NOTES
Problem List  Date Reviewed: 9/2/2021        Codes Class Noted    Supervision of other normal pregnancy ICD-10-CM: Z34.80  ICD-9-CM: V22.1  6/8/2021    Overview Addendum 8/20/2021  9:35 AM by Lia Day     Intrauterine pregnancy with the following problems identified:   EDC 1/19/2022 by D=US (ACOG)  NIPTS- normal  [facility]- neg  ?covid shot  AFP- Positive MFM 8/18  MFM - clot above cervix on ultrasound- addressed @MFM 8/18             ACL (anterior cruciate ligament) rupture ICD-10-CM: W92.127P  ICD-9-CM: 844.2  12/2/2019        Environmental allergies ICD-10-CM: Z91.09  ICD-9-CM: V15.09  3/23/2012

## 2021-10-01 ENCOUNTER — HOSPITAL ENCOUNTER (OUTPATIENT)
Dept: PERINATAL CARE | Age: 31
Discharge: HOME | End: 2021-10-01
Attending: OBSTETRICS & GYNECOLOGY
Payer: MEDICAID

## 2021-10-01 PROCEDURE — 76816 OB US FOLLOW-UP PER FETUS: CPT | Performed by: OBSTETRICS & GYNECOLOGY

## 2021-10-07 ENCOUNTER — HOSPITAL ENCOUNTER (OUTPATIENT)
Dept: PERINATAL CARE | Age: 31
Discharge: HOME | End: 2021-10-07
Attending: OBSTETRICS & GYNECOLOGY
Payer: MEDICAID

## 2021-10-07 PROCEDURE — 76815 OB US LIMITED FETUS(S): CPT | Performed by: OBSTETRICS & GYNECOLOGY

## 2021-10-16 ENCOUNTER — PATIENT MESSAGE (OUTPATIENT)
Dept: OBGYN CLINIC | Age: 31
End: 2021-10-16

## 2021-10-17 DIAGNOSIS — J20.9 ACUTE BRONCHITIS DUE TO INFECTION: ICD-10-CM

## 2021-10-17 DIAGNOSIS — Z20.822 SUSPECTED COVID-19 VIRUS INFECTION: ICD-10-CM

## 2021-10-17 DIAGNOSIS — R52 GENERALIZED BODY ACHES: Primary | ICD-10-CM

## 2021-10-17 RX ORDER — ASCORBIC ACID 500 MG
500 TABLET ORAL DAILY
Qty: 30 TABLET | Refills: 2 | Status: SHIPPED | OUTPATIENT
Start: 2021-10-17 | End: 2022-06-07 | Stop reason: ALTCHOICE

## 2021-10-17 RX ORDER — DICLOFENAC SODIUM 10 MG/G
GEL TOPICAL 4 TIMES DAILY
Qty: 100 G | Refills: 0 | Status: SHIPPED | OUTPATIENT
Start: 2021-10-17 | End: 2021-10-26

## 2021-10-18 NOTE — TELEPHONE ENCOUNTER
Per Md request patient offered monoclonal antibody infusions at [facility]. Patient states that she has researched this and is interested in the therapy. A referral has been sent to [facility] and they will contact the patient to set up treatment.

## 2021-10-19 ENCOUNTER — TELEPHONE (OUTPATIENT)
Dept: OBGYN CLINIC | Age: 31
End: 2021-10-19

## 2021-10-19 NOTE — TELEPHONE ENCOUNTER
Pt calling to [facility] on referral for [facility] for Monoclonal antibodies. This RN advised pt referral has been sent and they will contact pt. Pt verbalized understanding.

## 2021-10-20 NOTE — TELEPHONE ENCOUNTER
Call received at 9:45am      32year old patient last seen in the office on 2021. Patient is  27w0d pregnant  Patient calling about the phone number for the infusion center and was provided with the phone number of [phone]    Patient verbalized understanding.

## 2021-10-22 ENCOUNTER — TELEPHONE (OUTPATIENT)
Dept: OBGYN CLINIC | Age: 31
End: 2021-10-22

## 2021-10-22 ENCOUNTER — HOSPITAL ENCOUNTER (INPATIENT)
Age: 31
LOS: 4 days | Discharge: HOME | DRG: 566 | End: 2021-10-26
Attending: EMERGENCY MEDICINE | Admitting: OBSTETRICS & GYNECOLOGY
Payer: MEDICAID

## 2021-10-22 ENCOUNTER — APPOINTMENT (OUTPATIENT)
Dept: GENERAL RADIOLOGY | Age: 31
DRG: 566 | End: 2021-10-22
Attending: EMERGENCY MEDICINE
Payer: MEDICAID

## 2021-10-22 DIAGNOSIS — Z3A.27 27 WEEKS GESTATION OF PREGNANCY: ICD-10-CM

## 2021-10-22 DIAGNOSIS — U07.1 COVID-19: Primary | ICD-10-CM

## 2021-10-22 DIAGNOSIS — R06.00 DYSPNEA, UNSPECIFIED TYPE: ICD-10-CM

## 2021-10-22 PROBLEM — O98.513 COVID-19 AFFECTING PREGNANCY IN THIRD TRIMESTER: Status: ACTIVE | Noted: 2021-10-22

## 2021-10-22 LAB
ALBUMIN SERPL-MCNC: 2.3 G/DL (ref 3.5–5)
ALBUMIN/GLOB SERPL: 0.6 {RATIO} (ref 1.1–2.2)
ALP SERPL-CCNC: 86 U/L (ref 45–117)
ALT SERPL-CCNC: 48 U/L (ref 12–78)
ANION GAP SERPL CALC-SCNC: 12 MMOL/L (ref 5–15)
ARTERIAL PATENCY WRIST A: POSITIVE
AST SERPL-CCNC: 85 U/L (ref 15–37)
BASE DEFICIT BLD-SCNC: 9.6 MMOL/L
BASOPHILS # BLD: 0 K/UL (ref 0–0.1)
BASOPHILS NFR BLD: 0 % (ref 0–1)
BDY SITE: ABNORMAL
BILIRUB SERPL-MCNC: 1 MG/DL (ref 0.2–1)
BUN SERPL-MCNC: 4 MG/DL (ref 6–20)
BUN/CREAT SERPL: 8 (ref 12–20)
CALCIUM SERPL-MCNC: 8.4 MG/DL (ref 8.5–10.1)
CHLORIDE SERPL-SCNC: 113 MMOL/L (ref 97–108)
CO2 SERPL-SCNC: 13 MMOL/L (ref 21–32)
CREAT SERPL-MCNC: 0.53 MG/DL (ref 0.55–1.02)
CRP SERPL-MCNC: 5.05 MG/DL (ref 0–0.6)
DIFFERENTIAL METHOD BLD: ABNORMAL
EOSINOPHIL # BLD: 0 K/UL (ref 0–0.4)
EOSINOPHIL NFR BLD: 0 % (ref 0–7)
ERYTHROCYTE [DISTWIDTH] IN BLOOD BY AUTOMATED COUNT: 14.4 % (ref 11.5–14.5)
FERRITIN SERPL-MCNC: 219 NG/ML (ref 8–252)
FIBRINOGEN PPP-MCNC: 433 MG/DL (ref 200–475)
GLOBULIN SER CALC-MCNC: 4 G/DL (ref 2–4)
GLUCOSE SERPL-MCNC: 88 MG/DL (ref 65–100)
HCO3 BLD-SCNC: 14.8 MMOL/L (ref 22–26)
HCT VFR BLD AUTO: 30.7 % (ref 35–47)
HGB BLD-MCNC: 10.3 G/DL (ref 11.5–16)
IMM GRANULOCYTES # BLD AUTO: 0 K/UL
IMM GRANULOCYTES NFR BLD AUTO: 0 %
LDH SERPL L TO P-CCNC: 371 U/L (ref 81–246)
LYMPHOCYTES # BLD: 1.1 K/UL (ref 0.8–3.5)
LYMPHOCYTES NFR BLD: 13 % (ref 12–49)
MAGNESIUM SERPL-MCNC: 1.7 MG/DL (ref 1.6–2.4)
MCH RBC QN AUTO: 32 PG (ref 26–34)
MCHC RBC AUTO-ENTMCNC: 33.6 G/DL (ref 30–36.5)
MCV RBC AUTO: 95.3 FL (ref 80–99)
METAMYELOCYTES NFR BLD MANUAL: 5 %
MONOCYTES # BLD: 0.2 K/UL (ref 0–1)
MONOCYTES NFR BLD: 3 % (ref 5–13)
MYELOCYTES NFR BLD MANUAL: 2 %
NEUTS BAND NFR BLD MANUAL: 1 % (ref 0–6)
NEUTS SEG # BLD: 6.3 K/UL (ref 1.8–8)
NEUTS SEG NFR BLD: 76 % (ref 32–75)
NRBC # BLD: 0 K/UL (ref 0–0.01)
NRBC BLD-RTO: 0 PER 100 WBC
PCO2 BLD: 26.6 MMHG (ref 35–45)
PH BLD: 7.35 [PH] (ref 7.35–7.45)
PLATELET # BLD AUTO: 190 K/UL (ref 150–400)
PMV BLD AUTO: 10 FL (ref 8.9–12.9)
PO2 BLD: 117 MMHG (ref 80–100)
POTASSIUM SERPL-SCNC: 3.6 MMOL/L (ref 3.5–5.1)
PROCALCITONIN SERPL-MCNC: 0.22 NG/ML
PROT SERPL-MCNC: 6.3 G/DL (ref 6.4–8.2)
RBC # BLD AUTO: 3.22 M/UL (ref 3.8–5.2)
RBC MORPH BLD: ABNORMAL
SAO2 % BLD: 98.5 % (ref 92–97)
SODIUM SERPL-SCNC: 138 MMOL/L (ref 136–145)
SPECIMEN TYPE: ABNORMAL
WBC # BLD AUTO: 8.2 K/UL (ref 3.6–11)

## 2021-10-22 PROCEDURE — 82728 ASSAY OF FERRITIN: CPT

## 2021-10-22 PROCEDURE — 74011250637 HC RX REV CODE- 250/637: Performed by: FAMILY MEDICINE

## 2021-10-22 PROCEDURE — 74011000250 HC RX REV CODE- 250: Performed by: FAMILY MEDICINE

## 2021-10-22 PROCEDURE — 83735 ASSAY OF MAGNESIUM: CPT

## 2021-10-22 PROCEDURE — 74011250636 HC RX REV CODE- 250/636: Performed by: OBSTETRICS & GYNECOLOGY

## 2021-10-22 PROCEDURE — 36415 COLL VENOUS BLD VENIPUNCTURE: CPT

## 2021-10-22 PROCEDURE — 36600 WITHDRAWAL OF ARTERIAL BLOOD: CPT

## 2021-10-22 PROCEDURE — 74011250637 HC RX REV CODE- 250/637: Performed by: EMERGENCY MEDICINE

## 2021-10-22 PROCEDURE — 99222 1ST HOSP IP/OBS MODERATE 55: CPT | Performed by: OBSTETRICS & GYNECOLOGY

## 2021-10-22 PROCEDURE — 74011000250 HC RX REV CODE- 250: Performed by: OBSTETRICS & GYNECOLOGY

## 2021-10-22 PROCEDURE — 65270000029 HC RM PRIVATE

## 2021-10-22 PROCEDURE — 85025 COMPLETE CBC W/AUTO DIFF WBC: CPT

## 2021-10-22 PROCEDURE — 83615 LACTATE (LD) (LDH) ENZYME: CPT

## 2021-10-22 PROCEDURE — 96374 THER/PROPH/DIAG INJ IV PUSH: CPT

## 2021-10-22 PROCEDURE — 96375 TX/PRO/DX INJ NEW DRUG ADDON: CPT

## 2021-10-22 PROCEDURE — 86140 C-REACTIVE PROTEIN: CPT

## 2021-10-22 PROCEDURE — 82803 BLOOD GASES ANY COMBINATION: CPT

## 2021-10-22 PROCEDURE — 74011250636 HC RX REV CODE- 250/636: Performed by: FAMILY MEDICINE

## 2021-10-22 PROCEDURE — 84145 PROCALCITONIN (PCT): CPT

## 2021-10-22 PROCEDURE — 71045 X-RAY EXAM CHEST 1 VIEW: CPT

## 2021-10-22 PROCEDURE — 80053 COMPREHEN METABOLIC PANEL: CPT

## 2021-10-22 PROCEDURE — 85384 FIBRINOGEN ACTIVITY: CPT

## 2021-10-22 PROCEDURE — 74011250637 HC RX REV CODE- 250/637: Performed by: NURSE PRACTITIONER

## 2021-10-22 PROCEDURE — 96361 HYDRATE IV INFUSION ADD-ON: CPT

## 2021-10-22 PROCEDURE — 99285 EMERGENCY DEPT VISIT HI MDM: CPT

## 2021-10-22 PROCEDURE — 74011250636 HC RX REV CODE- 250/636: Performed by: EMERGENCY MEDICINE

## 2021-10-22 PROCEDURE — 93005 ELECTROCARDIOGRAM TRACING: CPT

## 2021-10-22 RX ORDER — DIPHENHYDRAMINE HCL 25 MG
25 CAPSULE ORAL
Status: DISCONTINUED | OUTPATIENT
Start: 2021-10-22 | End: 2021-10-26 | Stop reason: HOSPADM

## 2021-10-22 RX ORDER — ACETAMINOPHEN 325 MG/1
650 TABLET ORAL
Status: DISCONTINUED | OUTPATIENT
Start: 2021-10-22 | End: 2021-10-26 | Stop reason: HOSPADM

## 2021-10-22 RX ORDER — DEXAMETHASONE SODIUM PHOSPHATE 4 MG/ML
6 INJECTION, SOLUTION INTRA-ARTICULAR; INTRALESIONAL; INTRAMUSCULAR; INTRAVENOUS; SOFT TISSUE ONCE
Status: COMPLETED | OUTPATIENT
Start: 2021-10-22 | End: 2021-10-22

## 2021-10-22 RX ORDER — FAMOTIDINE 20 MG/1
20 TABLET, FILM COATED ORAL EVERY 12 HOURS
Status: DISCONTINUED | OUTPATIENT
Start: 2021-10-22 | End: 2021-10-25

## 2021-10-22 RX ORDER — ASPIRIN 81 MG/1
81 TABLET ORAL DAILY
Status: DISCONTINUED | OUTPATIENT
Start: 2021-10-23 | End: 2021-10-26 | Stop reason: HOSPADM

## 2021-10-22 RX ORDER — ONDANSETRON 4 MG/1
4 TABLET, ORALLY DISINTEGRATING ORAL
Status: DISCONTINUED | OUTPATIENT
Start: 2021-10-22 | End: 2021-10-26 | Stop reason: HOSPADM

## 2021-10-22 RX ORDER — FOLIC ACID/MULTIVIT,IRON,MINER 0.4MG-18MG
1 TABLET ORAL DAILY
Status: DISCONTINUED | OUTPATIENT
Start: 2021-10-23 | End: 2021-10-26 | Stop reason: HOSPADM

## 2021-10-22 RX ORDER — LANOLIN ALCOHOL/MO/W.PET/CERES
1 CREAM (GRAM) TOPICAL
Status: DISCONTINUED | OUTPATIENT
Start: 2021-10-23 | End: 2021-10-26 | Stop reason: HOSPADM

## 2021-10-22 RX ORDER — SODIUM BICARBONATE 1 MEQ/ML
50 SYRINGE (ML) INTRAVENOUS ONCE
Status: COMPLETED | OUTPATIENT
Start: 2021-10-22 | End: 2021-10-22

## 2021-10-22 RX ORDER — SODIUM CHLORIDE 0.9 % (FLUSH) 0.9 %
5-40 SYRINGE (ML) INJECTION EVERY 8 HOURS
Status: DISCONTINUED | OUTPATIENT
Start: 2021-10-22 | End: 2021-10-26 | Stop reason: HOSPADM

## 2021-10-22 RX ORDER — SODIUM CHLORIDE 9 MG/ML
125 INJECTION, SOLUTION INTRAVENOUS ONCE
Status: COMPLETED | OUTPATIENT
Start: 2021-10-22 | End: 2021-10-22

## 2021-10-22 RX ORDER — ALBUTEROL SULFATE 90 UG/1
2 AEROSOL, METERED RESPIRATORY (INHALATION) ONCE
Status: COMPLETED | OUTPATIENT
Start: 2021-10-22 | End: 2021-10-22

## 2021-10-22 RX ORDER — ASCORBIC ACID 500 MG
500 TABLET ORAL 2 TIMES DAILY
Status: DISCONTINUED | OUTPATIENT
Start: 2021-10-22 | End: 2021-10-25

## 2021-10-22 RX ORDER — SODIUM CHLORIDE 0.9 % (FLUSH) 0.9 %
5-40 SYRINGE (ML) INJECTION AS NEEDED
Status: DISCONTINUED | OUTPATIENT
Start: 2021-10-22 | End: 2021-10-26 | Stop reason: HOSPADM

## 2021-10-22 RX ORDER — ONDANSETRON 2 MG/ML
4 INJECTION INTRAMUSCULAR; INTRAVENOUS
Status: COMPLETED | OUTPATIENT
Start: 2021-10-22 | End: 2021-10-22

## 2021-10-22 RX ADMIN — CEFEPIME HYDROCHLORIDE 2 G: 2 INJECTION, POWDER, FOR SOLUTION INTRAVENOUS at 22:03

## 2021-10-22 RX ADMIN — DEXAMETHASONE SODIUM PHOSPHATE 6 MG: 4 INJECTION, SOLUTION INTRAMUSCULAR; INTRAVENOUS at 10:54

## 2021-10-22 RX ADMIN — Medication 10 ML: at 22:03

## 2021-10-22 RX ADMIN — ONDANSETRON 4 MG: 2 INJECTION INTRAMUSCULAR; INTRAVENOUS at 10:22

## 2021-10-22 RX ADMIN — Medication 10 ML: at 17:55

## 2021-10-22 RX ADMIN — OXYCODONE HYDROCHLORIDE AND ACETAMINOPHEN 500 MG: 500 TABLET ORAL at 17:56

## 2021-10-22 RX ADMIN — Medication 10 ML: at 16:03

## 2021-10-22 RX ADMIN — PROMETHAZINE HYDROCHLORIDE 12.5 MG: 25 INJECTION INTRAMUSCULAR; INTRAVENOUS at 17:31

## 2021-10-22 RX ADMIN — SODIUM BICARBONATE 50 MEQ: 84 INJECTION INTRAVENOUS at 14:02

## 2021-10-22 RX ADMIN — FAMOTIDINE 20 MG: 20 TABLET ORAL at 23:26

## 2021-10-22 RX ADMIN — SODIUM CHLORIDE 500 ML: 9 INJECTION, SOLUTION INTRAVENOUS at 13:51

## 2021-10-22 RX ADMIN — SODIUM CHLORIDE 125 ML/HR: 9 INJECTION, SOLUTION INTRAVENOUS at 10:22

## 2021-10-22 RX ADMIN — DEXAMETHASONE 6 MG: 4 TABLET ORAL at 13:52

## 2021-10-22 RX ADMIN — CEFEPIME HYDROCHLORIDE 2 G: 2 INJECTION, POWDER, FOR SOLUTION INTRAVENOUS at 13:51

## 2021-10-22 RX ADMIN — ALBUTEROL SULFATE 2 PUFF: 90 AEROSOL, METERED RESPIRATORY (INHALATION) at 10:52

## 2021-10-22 NOTE — ED PROVIDER NOTES
Patient is a 19-year-old female with past medical history significant for current pregnancy at 27 weeks gestation followed by Dr. Rogelio Corado presents emergency department via EMS for shortness of breath and nausea vomiting related to known Covid infection. She states she started feeling ill last Thursday and then tested +6 days ago. She states that she is not vaccinated yet because she was trying to wait till further in the third trimester before she got her vaccine. Patient states she has had more shortness of breath but is also not been able to keep fluids down. Denies any problems with the pregnancy thus far. Denies any unusual peripheral swelling. She states she is feeling good fetal movement. Past Medical History:   Diagnosis Date    Anemia     Chlamydia 1/8/2014    ESCOBAR II (cervical intraepithelial neoplasia II)     Dysplasia of cervix, low grade (ESCOBAR 1)     Encounter for insertion of mirena IUD 05/25/2018    Mirena placed    Encounter for IUD removal 08/27/2020    Headache        Past Surgical History:   Procedure Laterality Date    HX ACL RECONSTRUCTION Left 2008    HX ACL RECONSTRUCTION  2019    HX ADENOIDECTOMY  1994    HX COLPOSCOPY      4/28/10- ecto ESCOBAR 2----4/5/12 ESCOBAR 1---7/18/13-negative    HX GYN      IUD    HX LAP CHOLECYSTECTOMY  10/04/2016    Laparoscopic cholecystectomy by Dr. David Landis.     HX OTHER SURGICAL      HX TONSILLECTOMY  2014         Family History:   Adopted: Yes   Problem Relation Age of Onset    No Known Problems Mother     No Known Problems Father     Diabetes Maternal Uncle     Hypertension Maternal Grandmother     Gall Bladder Disease Maternal Grandmother     Anesth Problems Neg Hx        Social History     Socioeconomic History    Marital status: SINGLE     Spouse name: Not on file    Number of children: Not on file    Years of education: Not on file    Highest education level: Not on file   Occupational History    Not on file   Tobacco Use    Smoking status: Former Smoker     Packs/day: 0.25     Years: 0.50     Pack years: 0.12     Quit date:      Years since quittin.8    Smokeless tobacco: Never Used   Substance and Sexual Activity    Alcohol use: Yes     Comment: social    Drug use: Not Currently     Comment:     Sexual activity: Yes     Partners: Male     Birth control/protection: None, I.U.D. Other Topics Concern    Not on file   Social History Narrative    Not on file     Social Determinants of Health     Financial Resource Strain:     Difficulty of Paying Living Expenses:    Food Insecurity:     Worried About Running Out of Food in the Last Year:     [address] in the Last Year:    Transportation Needs:     Lack of Transportation (Medical):  Lack of Transportation (Non-Medical):    Physical Activity:     Days of Exercise per Week:     Minutes of Exercise per Session:    Stress:     Feeling of Stress :    Social Connections:     Frequency of Communication with Friends and Family:     Frequency of Social Gatherings with Friends and Family:     Attends [employer] Services:     Active Member of Clubs or Organizations:     Attends Club or Organization Meetings:     Marital Status:    Intimate Partner Violence:     Fear of Current or Ex-Partner:     Emotionally Abused:     Physically Abused:     Sexually Abused: ALLERGIES: Depo-provera [medroxyprogesterone], Reclipsen (28) [desogestrel-ethinyl estradiol], Sulfa (sulfonamide antibiotics), Penicillins, and Sulfamethoxazole-trimethoprim    Review of Systems   Constitutional: Positive for chills and fatigue. HENT: Negative for drooling. Eyes: Negative for photophobia. Respiratory: Positive for cough and shortness of breath. Cardiovascular: Negative for leg swelling. Gastrointestinal: Positive for nausea and vomiting. Negative for abdominal pain. Musculoskeletal: Positive for myalgias. Skin: Negative for rash.    Neurological: Negative for seizures and syncope. Psychiatric/Behavioral: Negative. Vitals:    10/22/21 0934   BP: 127/65   Pulse: 94   Resp: 22   Temp: 98 °F (36.7 °C)   SpO2: 96%   Weight: 93.1 kg (205 lb 4 oz)   Height: 5' 7.5\" (1.715 m)            Physical Exam  Vitals and nursing note reviewed. Constitutional:       Appearance: She is not toxic-appearing or diaphoretic. HENT:      Head: Normocephalic and atraumatic. Cardiovascular:      Rate and Rhythm: Normal rate. Heart sounds: No friction rub. Pulmonary:      Breath sounds: No stridor. Decreased breath sounds present. Chest:      Chest wall: No deformity or crepitus. Abdominal:      Tenderness: There is no abdominal tenderness. There is no guarding. Comments: Gravid abdomen   Musculoskeletal:      Cervical back: Neck supple. Right lower leg: No tenderness. Skin:     General: Skin is warm and dry. Neurological:      Mental Status: She is alert and oriented to person, place, and time. Psychiatric:         Mood and Affect: Mood normal.         Behavior: Behavior normal.          [facility]    ED Course as of Oct 22 1534   Fri Oct 22, 2021   1215 Case discussed with patient's on-call OB/GYN Dr. Elyssa Servin who recommended calling the [facility] prescription to get the patient admitted. Appreciate conversation with Dr. Jazmyn Stoner who will put in admission orders and will consult hospitalist service for management of Covid symptoms. [JE]   1231 EKG obtained at 1157 showing sinus rhythm, rate 95, nonspecific T wave abnormality, change compared to prior EKG.     [JE]      ED Course User Index  [JE] Marley Orellana MD       Critical Care  Performed by: Marley Orellana MD  Authorized by: Marley Orellana MD     Critical care provider statement:     Critical care time (minutes):  35    Critical care time was exclusive of:  Separately billable procedures and treating other patients    Critical care was necessary to treat or prevent imminent or life-threatening deterioration of the following conditions:  Respiratory failure    Critical care was time spent personally by me on the following activities:  Ordering and performing treatments and interventions, ordering and review of laboratory studies, ordering and review of radiographic studies, development of treatment plan with patient or surrogate, pulse oximetry, re-evaluation of patient's condition, discussions with consultants, evaluation of patient's response to treatment, examination of patient and obtaining history from patient or surrogate    I assumed direction of critical care for this patient from another provider in my specialty: no

## 2021-10-22 NOTE — TELEPHONE ENCOUNTER
[facility] ED calling for consult with MIRIAN This RN sent pt do ED d/t SOB.      Please call ED @ [phone]

## 2021-10-22 NOTE — PROGRESS NOTES
1055 Called to bedside at request of ED for EFM/NST. Under s/o T. Raheel Garcia MD [facility], admitted under Carmelina Kaminski) Pt is a  @ 27 2/ wks. [facility] 2022. Admitted for s/s of Covid +    1121 EFM/NST completed. FHR baseline of 150 with moderate variable, 10 x 10 accels with occasional variable. Reassuring tracing. Uterine irritability noted on strip, but pt denies ucs, back or pelvic pressure, or vaginal leaking of fluid. [address] CONRELL on call for [facility] Hospitalist has evaluated pt in ED, viewed EFM strip & reported results to [facility].

## 2021-10-22 NOTE — TELEPHONE ENCOUNTER
TH pt    27w2d    Pt calling with c/o SOB, coughing and vomiting. Pt was dx COVID + last week. This RN advised pt to go to ED to seek treatment. Pt states she did have the monoclonal antibodies already. This RN advised pt to still go to ED d/t SOB. Pt verbalized understanding.

## 2021-10-22 NOTE — PROGRESS NOTES
Admission Medication Reconciliation      In progress:    Unable to speak with patient face to face at this time due to general isolation precautions in the ED related to COVID-19 pandemic. Left voicemail for patient @ [phone], awaiting return call and will update once additional information becomes available. Chart notes and RX Query were used to update medication list thus far. Notes:  Monoclonal antibodies: per chart patient received monoclonal antibodies (probably last week) due to COVID positive status    Thank you for allowing me to participate in the care of your patient. Quentin Gillis PharmD, RN # [phone]       [facility] pharmacy benefit data reflects medications filled and processed through the patient's insurance, however   this data does NOT capture whether the medication was picked up or is currently being taken by the patient. Allergies:  Depo-provera [medroxyprogesterone], Reclipsen (28) [desogestrel-ethinyl estradiol], Sulfa (sulfonamide antibiotics), Penicillins, and Sulfamethoxazole-trimethoprim    Significant PMH/Disease States:   Past Medical History:   Diagnosis Date    Anemia     Chlamydia 1/8/2014    ESCOBAR II (cervical intraepithelial neoplasia II)     Dysplasia of cervix, low grade (ESCOBAR 1)     Encounter for insertion of mirena IUD 05/25/2018    Mirena placed    Encounter for IUD removal 08/27/2020    Headache      Chief Complaint for this Admission:    Chief Complaint   Patient presents with    Shortness of Breath    Cough    Positive For Covid-19     Prior to Admission Medications:         Facility-Administered Medications: None     Please contact the main inpatient pharmacy with any questions or concerns at [phone] and we will direct you to the clinical pharmacist covering this patient's care while in-house.    ALTAGRACIA Eden

## 2021-10-22 NOTE — H&P
History and Physical    Patient: Radhames Perez MRN: [id number]  SSN: [id number]    YOB: 1990  Age: 32 y.o. Sex: female      Subjective:      Radhames Perez is a 32 y.o. female who is being admitted from the ED for symptomatic COVID. She was diagnosed with COVID 6 days ago, her symptoms were initially mild with fatigue and fever, and she was able to manage at home. She did receive monoclonal antibodies as an outpatient 2 days ago. Then, in the past 2 days, she began to feel worse with dyspnea, cough, and persistent nausea and emesis. She has not been able to hold anything down PO in the past 2 days. She reports no obstetric complaints. Feels very active fetal movements. Denies uterine pain, contractions, vaginal bleeding, LOF. For her pregnancy, she is followed by Dr. Jeffery Kincaid at [facility] OB location. Past Medical History:   Diagnosis Date    Anemia     Chlamydia 1/8/2014    ESCOBAR II (cervical intraepithelial neoplasia II)     Dysplasia of cervix, low grade (ESCOBAR 1)     Encounter for insertion of mirena IUD 05/25/2018    Mirena placed    Encounter for IUD removal 08/27/2020    Headache      Past Surgical History:   Procedure Laterality Date    HX ACL RECONSTRUCTION Left 2008    HX ACL RECONSTRUCTION  2019    HX ADENOIDECTOMY  1994    HX COLPOSCOPY      4/28/10- ecto ESCOBAR 2----4/5/12 ESCOBAR 1---7/18/13-negative    HX GYN      IUD    HX LAP CHOLECYSTECTOMY  10/04/2016    Laparoscopic cholecystectomy by Dr. Isaias Tubbs.     HX OTHER SURGICAL      HX TONSILLECTOMY  2014      Family History   Adopted: Yes   Problem Relation Age of Onset    No Known Problems Mother     No Known Problems Father     Diabetes Maternal Uncle     Hypertension Maternal Grandmother     Gall Bladder Disease Maternal Grandmother     Anesth Problems Neg Hx      Social History     Tobacco Use    Smoking status: Former Smoker     Packs/day: 0.25     Years: 0.50     Pack years: 0.12     Quit date: 2017     Years since quittin.8    Smokeless tobacco: Never Used   Substance Use Topics    Alcohol use: Yes     Comment: social      Prior to Admission medications    Medication Sig Start Date End Date Taking? Authorizing Provider   diclofenac (VOLTAREN) 1 % gel Apply  to affected area four (4) times daily. 10/17/21   Marilynn Martin MD   ascorbic acid, vitamin C, (VITAMIN C) 500 mg tablet Take 1 Tablet by mouth daily. 10/17/21   Marilynn Martin MD   ondansetron (ZOFRAN ODT) 8 mg disintegrating tablet Take 1 Tablet by mouth every eight (8) hours as needed for Nausea or Vomiting. 21   Raman Godinez MD   prenatal vit-iron fumarate-fa 27 mg iron- 0.8 mg tab tablet Take 1 Tablet by mouth daily. 6/3/21   Raman Godinez MD        Allergies   Allergen Reactions    Depo-Provera [Medroxyprogesterone] Hives    Reclipsen (28) [Desogestrel-Ethinyl Estradiol] Hives    Sulfa (Sulfonamide Antibiotics) Hives     As child    Penicillins Hives and Rash    Sulfamethoxazole-Trimethoprim Rash       Review of Systems:  A comprehensive review of systems was negative except for that written in the History of Present Illness. Objective:     Vitals:    10/22/21 1048 10/22/21 1100 10/22/21 1200 10/22/21 1300   BP:  119/61 119/64 123/64   Pulse: 94 94 94 (!) 102   Resp:    Temp:       SpO2: 98%      Weight:       Height:            Physical Exam:  GENERAL: alert, cooperative, no distress, appears stated age  LUNG: mildly dyspneic with talking  HEART: mildly tachycardic  ABDOMEN: soft, non-tender.  Gravid, nondistended  EXTREMITIES:  extremities normal, atraumatic, no cyanosis or edema  SKIN: Normal.  NEUROLOGIC: negative  PSYCHIATRIC: non focal    NST: reactive and reassuring for gestational age  [de-identified]: no ctx    Assessment:     Hospital Problems  Date Reviewed: 2021        Codes Class Noted POA    COVID-19 affecting pregnancy in third trimester ICD-10-CM: O98.513, U07.1  ICD-9-CM: 647.63, 079.89  10/22/2021 Unknown Plan:     Admit for supportive care for symptomatic COVID in 27 week pregnancy. O2 sats on RA 90%, goal in pregnancy is >95%. No current obstetric complaints.    - admit under OB with consult by Medicine Hospitalist team to aid in COVID management  - bid NSTs  - IV fluid rehydration, antiemetics as needed  - start aspirin 81mg daily for reducing VTE risk  - start iron 325mg daily for physiologic mild anemia of pregnancy  - general diet as tolerated  - supplemental O2 as needed, currently on 4L supplemental O2 by nasal cannula  - COVID management as per hospitalist team      Signed By: Katelynn Coyne MD     October 22, 2021

## 2021-10-22 NOTE — ED TRIAGE NOTES
The patient has SOB, cough,for 2 days, N/V, loss of appetite for the last 8 days. Covid positive for 6 days. * months pregnant.  Para 3,  4, [address] 2022

## 2021-10-22 NOTE — TELEPHONE ENCOUNTER
[facility] ED provider calls with pt condition. Pt has Covid 19 and is requiring 2 L of O2 to maintain 90% oxygenation. Suggested she contact [facility] to admit and notify [facility]G to follow along with Medicine hospitalist to manage.

## 2021-10-22 NOTE — ED NOTES
TRANSFER - OUT REPORT:    Verbal report given to Raulito on [address]  being transferred to [facility] for routine progression of care       Report consisted of patients Situation, Background, Assessment and   Recommendations(SBAR). Information from the following report(s) SBAR was reviewed with the receiving nurse. Lines:   Peripheral IV 10/22/21 Left Antecubital (Active)   Site Assessment Clean, dry, & intact 10/22/21 1022        Opportunity for questions and clarification was provided.       Patient transported with:   [employer]

## 2021-10-22 NOTE — H&P
Consult note     Primary Care Provider: Leeann Graham MD  Source of Information: Patient     History of Presenting Illness:   Radhames Perez is a 32 y.o. female who presents with shortness of breath    Patient with IUP at 27 weeks presents to the hospital with nausea vomiting, shortness of breath, and loss of appetite that is started about 8 days back. Patient is also lost her sense of taste and smell. Patient reports that she tested positive for Covid about 6 days back. Patient did receive monoclonal antibodies on an outpatient basis, came to the ER and hospital service consult was requested. Patient was found to be hypoxic in the ER. Patient denies any other complaints or problems. Review of Systems:  A comprehensive review of systems was negative except for that written in the History of Present Illness. All other systems reviewed, pertinent positives and negatives noted in HPI    Past Medical History:   Diagnosis Date    Anemia     Chlamydia 1/8/2014    ESCOBAR II (cervical intraepithelial neoplasia II)     Dysplasia of cervix, low grade (ESCOBAR 1)     Encounter for insertion of mirena IUD 05/25/2018    Mirena placed    Encounter for IUD removal 08/27/2020    Headache       Past Surgical History:   Procedure Laterality Date    HX ACL RECONSTRUCTION Left 2008    HX ACL RECONSTRUCTION  2019    HX ADENOIDECTOMY  1994    HX COLPOSCOPY      4/28/10- ecto ESCOBAR 2----4/5/12 ESCOBAR 1---7/18/13-negative    HX GYN      IUD    HX LAP CHOLECYSTECTOMY  10/04/2016    Laparoscopic cholecystectomy by Dr. Isaias Tubbs.  HX OTHER SURGICAL      HX TONSILLECTOMY  2014     Prior to Admission medications    Medication Sig Start Date End Date Taking? Authorizing Provider   diclofenac (VOLTAREN) 1 % gel Apply  to affected area four (4) times daily. 10/17/21   Burton Martin MD   ascorbic acid, vitamin C, (VITAMIN C) 500 mg tablet Take 1 Tablet by mouth daily.  10/17/21   Leeann Graham MD ondansetron (ZOFRAN ODT) 8 mg disintegrating tablet Take 1 Tablet by mouth every eight (8) hours as needed for Nausea or Vomiting. 7/1/21   Siva Alston MD   prenatal vit-iron fumarate-fa 27 mg iron- 0.8 mg tab tablet Take 1 Tablet by mouth daily. 6/3/21   Siva Alston MD     Allergies   Allergen Reactions    Depo-Provera [Medroxyprogesterone] Hives    Reclipsen (28) [Desogestrel-Ethinyl Estradiol] Hives    Sulfa (Sulfonamide Antibiotics) Hives     As child    Penicillins Hives and Rash    Sulfamethoxazole-Trimethoprim Rash      Family History   Adopted: Yes   Problem Relation Age of Onset    No Known Problems Mother     No Known Problems Father     Diabetes Maternal Uncle     Hypertension Maternal Grandmother     Gall Bladder Disease Maternal Grandmother     Anesth Problems Neg Hx       Family history was discussed with the patient, all pertinent and relevant details are mentioned as above, no other pertinent and relevant family history was noted on my discussion with the patient.   Patient specifically denies any history of Gaucher disease in the family  SOCIAL HISTORY:  Patient resides:  Independently x   Assisted Living    SNF    With family care       Smoking history:   None x   Former    Chronic      Alcohol history:   None    Social x   Chronic      Ambulates:   Independently x   w/cane    w/walker    w/wc    CODE STATUS:  DNR    Full x   Other      Objective:     Physical Exam:     Visit Vitals  /61   Pulse 94   Temp 98 °F (36.7 °C)   Resp 23   Ht 5' 7.5\" (1.715 m)   Wt 93.1 kg (205 lb 4 oz)   LMP 04/14/2021 (Exact Date)   SpO2 98%   BMI 31.67 kg/m²    O2 Flow Rate (L/min): 4 l/min O2 Device: Nasal cannula    General : alert x 3, awake, mildly distressed  HEENT: PEERL, moist mucus membrane, TM clear  Neck: supple,   Chest: Coarse breath sounds bilateral lung bases  CVS: S1-S2 heard  Abd: soft/ Non tender,  Ext: no clubbing, no cyanosis, no edema, brisk 2+ DP pulses  Neuro/Psych: pleasant mood and affect, no focal neurological deficit  Skin: warm     EKG:  {Nonspecific ST changes      Data Review:     Recent Days:  Recent Labs     10/22/21  1019   WBC 8.2   HGB 10.3*   HCT 30.7*        Recent Labs     10/22/21  1019      K 3.6   *   CO2 13*   GLU 88   BUN 4*   CREA 0.53*   CA 8.4*   MG 1.7   ALB 2.3*   ALT 48     No results for input(s): PH, PCO2, PO2, HCO3, FIO2 in the last 72 hours. 24 Hour Results:  Recent Results (from the past 24 hour(s))   CBC WITH AUTOMATED DIFF    Collection Time: 10/22/21 10:19 AM   Result Value Ref Range    WBC 8.2 3.6 - 11.0 K/uL    RBC 3.22 (L) 3.80 - 5.20 M/uL    HGB 10.3 (L) 11.5 - 16.0 g/dL    HCT 30.7 (L) 35.0 - 47.0 %    MCV 95.3 80.0 - 99.0 FL    MCH 32.0 26.0 - 34.0 PG    MCHC 33.6 30.0 - 36.5 g/dL    RDW 14.4 11.5 - 14.5 %    PLATELET 724 450 - 080 K/uL    MPV 10.0 8.9 - 12.9 FL    NRBC 0.0 0  WBC    ABSOLUTE NRBC 0.00 0.00 - 0.01 K/uL    NEUTROPHILS 76 (H) 32 - 75 %    BAND NEUTROPHILS 1 0 - 6 %    LYMPHOCYTES 13 12 - 49 %    MONOCYTES 3 (L) 5 - 13 %    EOSINOPHILS 0 0 - 7 %    BASOPHILS 0 0 - 1 %    METAMYELOCYTES 5 (H) 0 %    MYELOCYTES 2 (H) 0 %    IMMATURE GRANULOCYTES 0 %    ABS. NEUTROPHILS 6.3 1.8 - 8.0 K/UL    ABS. LYMPHOCYTES 1.1 0.8 - 3.5 K/UL    ABS. MONOCYTES 0.2 0.0 - 1.0 K/UL    ABS. EOSINOPHILS 0.0 0.0 - 0.4 K/UL    ABS. BASOPHILS 0.0 0.0 - 0.1 K/UL    ABS. IMM.  GRANS. 0.0 K/UL    DF MANUAL      RBC COMMENTS LAKIA CELLS  PRESENT       METABOLIC PANEL, COMPREHENSIVE    Collection Time: 10/22/21 10:19 AM   Result Value Ref Range    Sodium 138 136 - 145 mmol/L    Potassium 3.6 3.5 - 5.1 mmol/L    Chloride 113 (H) 97 - 108 mmol/L    CO2 13 (LL) 21 - 32 mmol/L    Anion gap 12 5 - 15 mmol/L    Glucose 88 65 - 100 mg/dL    BUN 4 (L) 6 - 20 MG/DL    Creatinine 0.53 (L) 0.55 - 1.02 MG/DL    BUN/Creatinine ratio 8 (L) 12 - 20      GFR est AA >60 >60 ml/min/1.73m2    GFR est non-AA >60 >60 ml/min/1.73m2    Calcium 8.4 (L) 8.5 - 10.1 MG/DL    Bilirubin, total 1.0 0.2 - 1.0 MG/DL    ALT (SGPT) 48 12 - 78 U/L    AST (SGOT) 85 (H) 15 - 37 U/L    Alk. phosphatase 86 45 - 117 U/L    Protein, total 6.3 (L) 6.4 - 8.2 g/dL    Albumin 2.3 (L) 3.5 - 5.0 g/dL    Globulin 4.0 2.0 - 4.0 g/dL    A-G Ratio 0.6 (L) 1.1 - 2.2     MAGNESIUM    Collection Time: 10/22/21 10:19 AM   Result Value Ref Range    Magnesium 1.7 1.6 - 2.4 mg/dL   FIBRINOGEN    Collection Time: 10/22/21 10:19 AM   Result Value Ref Range    Fibrinogen 433 200 - 475 mg/dL   PROCALCITONIN    Collection Time: 10/22/21 10:19 AM   Result Value Ref Range    Procalcitonin 0.22 ng/mL   C REACTIVE PROTEIN, QT    Collection Time: 10/22/21 10:19 AM   Result Value Ref Range    C-Reactive protein 5.05 (H) 0.00 - 0.60 mg/dL   LD    Collection Time: 10/22/21 10:19 AM   Result Value Ref Range     (H) 81 - 246 U/L   FERRITIN    Collection Time: 10/22/21 10:19 AM   Result Value Ref Range    Ferritin 219 8 - 252 NG/ML   EKG, 12 LEAD, INITIAL    Collection Time: 10/22/21 11:57 AM   Result Value Ref Range    Ventricular Rate 95 BPM    Atrial Rate 95 BPM    P-R Interval 122 ms    QRS Duration 86 ms    Q-T Interval 368 ms    QTC Calculation (Bezet) 462 ms    Calculated P Axis 50 degrees    Calculated R Axis 63 degrees    Calculated T Axis 37 degrees    Diagnosis       Normal sinus rhythm  Nonspecific T wave abnormality  Prolonged QT  Abnormal ECG  When compared with ECG of 20-MAY-2020 09:40,  Nonspecific T wave abnormality, worse in Anterior leads           Imaging:       XR CHEST PORT    Result Date: 10/22/2021  Bilateral pulmonary infiltrates are compatible with atypical viral pneumonia.        Assessment/Plan     Acute hypoxic respiratory failure  COVID-19 pneumonitis  Mercedes IUP at 27 weeks  Anemia      Patient will be admitted to the [facility] service  Spoke with andrew Garces to start dexamethasone and vitamin C, cefepime IV,, oxygen support, ABG, patient with low bicarb, unclear whether respiratory alkalosis with compensation. Metabolic acidosis, will await ABG, supportive care and close monitoring, obtain labs on a daily basis, continue to monitor, will recommend getting a pulmonary consult  Plan per OB  Likely anemia of pregnancy, monitor H&H    GI DVT prophylaxis: Per primary     CRITICAL CARE ATTESTATION:  I had a face to face encounter with the patient, reviewed and interpreted patient data including clinical events, labs, images, vital signs, I/O's, and examined patient. I have discussed the case and the plan and management of the patient's care with the consulting services, the bedside nurses and necessary ancillary providers. NOTE OF PERSONAL INVOLVEMENT IN CARE   This patient has a high probability of imminent, clinically significant deterioration, which requires the highest level of preparedness to intervene urgently. I participated in the decision-making and personally managed or directed the management of the following life and organ supporting interventions that required my frequent assessment to treat or prevent imminent deterioration. I personally spent 48  minutes of critical care time. This is time spent at this critically ill patient's bedside actively involved in patient care as well as the coordination of care and discussions with the patient's family. This does not include any procedural time which has been billed separately. Please note that this dictation was completed with [employer]BR[employer], the computer voice recognition software. Quite often unanticipated grammatical, syntax, homophones, and other interpretive errors are inadvertently transcribed by the computer software. Please disregard these errors. Please excuse any errors that have escaped final proofreading.          Signed By: Taye Recio MD     October 22, 2021

## 2021-10-23 LAB
ALBUMIN SERPL-MCNC: 2.1 G/DL (ref 3.5–5)
ALBUMIN/GLOB SERPL: 0.6 {RATIO} (ref 1.1–2.2)
ALP SERPL-CCNC: 85 U/L (ref 45–117)
ALT SERPL-CCNC: 70 U/L (ref 12–78)
ANION GAP SERPL CALC-SCNC: 14 MMOL/L (ref 5–15)
APPEARANCE UR: CLEAR
AST SERPL-CCNC: 106 U/L (ref 15–37)
B-OH-BUTYR SERPL-SCNC: 3.94 MMOL/L
BACTERIA URNS QL MICRO: NEGATIVE /HPF
BASOPHILS # BLD: 0.1 K/UL (ref 0–0.1)
BASOPHILS NFR BLD: 1 % (ref 0–1)
BILIRUB SERPL-MCNC: 1 MG/DL (ref 0.2–1)
BILIRUB UR QL: NEGATIVE
BUN SERPL-MCNC: 4 MG/DL (ref 6–20)
BUN/CREAT SERPL: 9 (ref 12–20)
CALCIUM SERPL-MCNC: 8.1 MG/DL (ref 8.5–10.1)
CHLORIDE SERPL-SCNC: 113 MMOL/L (ref 97–108)
CO2 SERPL-SCNC: 13 MMOL/L (ref 21–32)
COLOR UR: ABNORMAL
CREAT SERPL-MCNC: 0.46 MG/DL (ref 0.55–1.02)
D DIMER PPP FEU-MCNC: 2.11 MG/L FEU (ref 0–0.65)
DIFFERENTIAL METHOD BLD: ABNORMAL
EOSINOPHIL # BLD: 0 K/UL (ref 0–0.4)
EOSINOPHIL NFR BLD: 0 % (ref 0–7)
EPITH CASTS URNS QL MICRO: ABNORMAL /LPF
ERYTHROCYTE [DISTWIDTH] IN BLOOD BY AUTOMATED COUNT: 14 % (ref 11.5–14.5)
FERRITIN SERPL-MCNC: 219 NG/ML (ref 26–388)
FIBRINOGEN PPP-MCNC: 439 MG/DL (ref 200–475)
GLOBULIN SER CALC-MCNC: 3.8 G/DL (ref 2–4)
GLUCOSE SERPL-MCNC: 93 MG/DL (ref 65–100)
GLUCOSE UR STRIP.AUTO-MCNC: NEGATIVE MG/DL
HCT VFR BLD AUTO: 26.6 % (ref 35–47)
HGB BLD-MCNC: 9 G/DL (ref 11.5–16)
HGB UR QL STRIP: NEGATIVE
HYALINE CASTS URNS QL MICRO: ABNORMAL /LPF (ref 0–5)
IMM GRANULOCYTES # BLD AUTO: 0 K/UL
IMM GRANULOCYTES NFR BLD AUTO: 0 %
INR PPP: 1 (ref 0.9–1.1)
KETONES UR QL STRIP.AUTO: 80 MG/DL
LACTATE SERPL-SCNC: 0.7 MMOL/L (ref 0.4–2)
LEUKOCYTE ESTERASE UR QL STRIP.AUTO: NEGATIVE
LYMPHOCYTES # BLD: 1.1 K/UL (ref 0.8–3.5)
LYMPHOCYTES NFR BLD: 14 % (ref 12–49)
MCH RBC QN AUTO: 32.3 PG (ref 26–34)
MCHC RBC AUTO-ENTMCNC: 33.8 G/DL (ref 30–36.5)
MCV RBC AUTO: 95.3 FL (ref 80–99)
METAMYELOCYTES NFR BLD MANUAL: 4 %
MONOCYTES # BLD: 0.6 K/UL (ref 0–1)
MONOCYTES NFR BLD: 7 % (ref 5–13)
MYELOCYTES NFR BLD MANUAL: 2 %
NEUTS BAND NFR BLD MANUAL: 6 % (ref 0–6)
NEUTS SEG # BLD: 5.7 K/UL (ref 1.8–8)
NEUTS SEG NFR BLD: 66 % (ref 32–75)
NITRITE UR QL STRIP.AUTO: NEGATIVE
NRBC # BLD: 0 K/UL (ref 0–0.01)
NRBC BLD-RTO: 0 PER 100 WBC
PH UR STRIP: 6.5 [PH] (ref 5–8)
PLATELET # BLD AUTO: 202 K/UL (ref 150–400)
PLATELET COMMENTS,PCOM: ABNORMAL
PMV BLD AUTO: 9.2 FL (ref 8.9–12.9)
POTASSIUM SERPL-SCNC: 3.3 MMOL/L (ref 3.5–5.1)
PROT SERPL-MCNC: 5.9 G/DL (ref 6.4–8.2)
PROT UR STRIP-MCNC: 30 MG/DL
PROTHROMBIN TIME: 10 SEC (ref 9–11.1)
RBC # BLD AUTO: 2.79 M/UL (ref 3.8–5.2)
RBC #/AREA URNS HPF: ABNORMAL /HPF (ref 0–5)
RBC MORPH BLD: ABNORMAL
RBC MORPH BLD: ABNORMAL
SODIUM SERPL-SCNC: 140 MMOL/L (ref 136–145)
SP GR UR REFRACTOMETRY: 1.01 (ref 1–1.03)
TROPONIN-HIGH SENSITIVITY: 9 NG/L (ref 0–51)
UA: UC IF INDICATED,UAUC: ABNORMAL
UROBILINOGEN UR QL STRIP.AUTO: 1 EU/DL (ref 0.2–1)
WBC # BLD AUTO: 7.9 K/UL (ref 3.6–11)
WBC URNS QL MICRO: ABNORMAL /HPF (ref 0–4)

## 2021-10-23 PROCEDURE — 74011000258 HC RX REV CODE- 258: Performed by: STUDENT IN AN ORGANIZED HEALTH CARE EDUCATION/TRAINING PROGRAM

## 2021-10-23 PROCEDURE — 74011250637 HC RX REV CODE- 250/637: Performed by: NURSE PRACTITIONER

## 2021-10-23 PROCEDURE — 82728 ASSAY OF FERRITIN: CPT

## 2021-10-23 PROCEDURE — 65270000029 HC RM PRIVATE

## 2021-10-23 PROCEDURE — 74011250637 HC RX REV CODE- 250/637: Performed by: OBSTETRICS & GYNECOLOGY

## 2021-10-23 PROCEDURE — 36415 COLL VENOUS BLD VENIPUNCTURE: CPT

## 2021-10-23 PROCEDURE — 80053 COMPREHEN METABOLIC PANEL: CPT

## 2021-10-23 PROCEDURE — 85379 FIBRIN DEGRADATION QUANT: CPT

## 2021-10-23 PROCEDURE — 74011000250 HC RX REV CODE- 250: Performed by: FAMILY MEDICINE

## 2021-10-23 PROCEDURE — 85610 PROTHROMBIN TIME: CPT

## 2021-10-23 PROCEDURE — 84484 ASSAY OF TROPONIN QUANT: CPT

## 2021-10-23 PROCEDURE — 83605 ASSAY OF LACTIC ACID: CPT

## 2021-10-23 PROCEDURE — 82010 KETONE BODYS QUAN: CPT

## 2021-10-23 PROCEDURE — 74011250636 HC RX REV CODE- 250/636: Performed by: FAMILY MEDICINE

## 2021-10-23 PROCEDURE — 85025 COMPLETE CBC W/AUTO DIFF WBC: CPT

## 2021-10-23 PROCEDURE — 81001 URINALYSIS AUTO W/SCOPE: CPT

## 2021-10-23 PROCEDURE — 74011250637 HC RX REV CODE- 250/637: Performed by: FAMILY MEDICINE

## 2021-10-23 PROCEDURE — 85384 FIBRINOGEN ACTIVITY: CPT

## 2021-10-23 PROCEDURE — 74011250637 HC RX REV CODE- 250/637: Performed by: STUDENT IN AN ORGANIZED HEALTH CARE EDUCATION/TRAINING PROGRAM

## 2021-10-23 RX ORDER — POTASSIUM CHLORIDE 750 MG/1
20 TABLET, FILM COATED, EXTENDED RELEASE ORAL
Status: COMPLETED | OUTPATIENT
Start: 2021-10-23 | End: 2021-10-23

## 2021-10-23 RX ORDER — DEXTROSE MONOHYDRATE AND SODIUM CHLORIDE 5; .9 G/100ML; G/100ML
75 INJECTION, SOLUTION INTRAVENOUS CONTINUOUS
Status: DISPENSED | OUTPATIENT
Start: 2021-10-23 | End: 2021-10-24

## 2021-10-23 RX ADMIN — ONDANSETRON 4 MG: 4 TABLET, ORALLY DISINTEGRATING ORAL at 08:12

## 2021-10-23 RX ADMIN — ASPIRIN 81 MG: 81 TABLET, COATED ORAL at 08:09

## 2021-10-23 RX ADMIN — ONDANSETRON 4 MG: 4 TABLET, ORALLY DISINTEGRATING ORAL at 17:34

## 2021-10-23 RX ADMIN — Medication 1 TABLET: at 08:09

## 2021-10-23 RX ADMIN — FAMOTIDINE 20 MG: 20 TABLET ORAL at 21:09

## 2021-10-23 RX ADMIN — OXYCODONE HYDROCHLORIDE AND ACETAMINOPHEN 500 MG: 500 TABLET ORAL at 08:09

## 2021-10-23 RX ADMIN — OXYCODONE HYDROCHLORIDE AND ACETAMINOPHEN 500 MG: 500 TABLET ORAL at 17:30

## 2021-10-23 RX ADMIN — FAMOTIDINE 20 MG: 20 TABLET ORAL at 08:09

## 2021-10-23 RX ADMIN — Medication 10 ML: at 05:45

## 2021-10-23 RX ADMIN — DEXTROSE AND SODIUM CHLORIDE 75 ML/HR: 5; 900 INJECTION, SOLUTION INTRAVENOUS at 13:45

## 2021-10-23 RX ADMIN — CEFEPIME HYDROCHLORIDE 2 G: 2 INJECTION, POWDER, FOR SOLUTION INTRAVENOUS at 13:45

## 2021-10-23 RX ADMIN — POTASSIUM CHLORIDE 20 MEQ: 750 TABLET, FILM COATED, EXTENDED RELEASE ORAL at 18:42

## 2021-10-23 RX ADMIN — CEFEPIME HYDROCHLORIDE 2 G: 2 INJECTION, POWDER, FOR SOLUTION INTRAVENOUS at 21:09

## 2021-10-23 RX ADMIN — FERROUS SULFATE TAB 325 MG (65 MG ELEMENTAL FE) 325 MG: 325 (65 FE) TAB at 08:09

## 2021-10-23 RX ADMIN — CEFEPIME HYDROCHLORIDE 2 G: 2 INJECTION, POWDER, FOR SOLUTION INTRAVENOUS at 05:45

## 2021-10-23 RX ADMIN — DEXAMETHASONE 6 MG: 4 TABLET ORAL at 08:09

## 2021-10-23 RX ADMIN — Medication 10 ML: at 13:45

## 2021-10-23 NOTE — PROGRESS NOTES
Hospitalist Progress Note    NAME: Neelima Goldsmith   :  1990   MRN:  [id number]     Summary:   Patient with IUP at 27 weeks presents to the hospital with nausea vomiting, shortness of breath, and loss of appetite that is started about 8 days back. She tested positive for Covid about 6 days back. Patient did receive monoclonal antibodies on an outpatient. Patient was found to be hypoxic in the ER. Assessment / Plan:    Acute hypoxic respiratory failure  COVID-19 pneumonia  -Tested positive for Covid a week PTA  -Had received monoclonal antibodies as an outpatient  -Hypoxic in ED and placed on 4 L nasal cannula, weaned down to 2 L today  -Chest x-ray showed bilateral pulmonary infiltrates  -CRP elevated at 5.  -Continue dexamethasone  -ON cefepime empirically, check PCT and will consider stopping abx if low  -Continue to wean down supplemental oxygen as tolerated, follow inflammatory markers. Metabolic acidosis secondary to starvation ketoacidosis  Nausea and vomiting  -Due to poor oral intake, nausea, vomiting related to COVID-19 infection  -Bicarb low at 13. ABG shows respiratory compensation.  -Beta hydroxybutyrate elevated at 3.9, urine positive for ketones  -S/p 50 mEq of sodium bicarb in ED. No improvement in bicarb level this morning.  -We will start D5NS. Recheck BMP tonight.  -Symptomatic management for nausea and vomiting with as needed Phenergan    Mild hypokalemia  -Replace and monitor    Normocytic anemia  -Likely related to pregnancy. Follow H&H. IUP at 27 weeks  -Managed by primary      30.0 - 39.9 Obese / Body mass index is 31.67 kg/m². Estimated discharge date: 10/25  Barriers:    Code status: Full  Prophylaxis: Per primary  Recommended Disposition: Home w/Family     Subjective:     Chief Complaint / Reason for Physician Visit  Discussed with RN events overnight. She feels much better, nausea and vomiting have subsided.    She states that she was able to keep down food    Review of Systems:  Symptom Y/N Comments  Symptom Y/N Comments   Fever/Chills    Chest Pain     Poor Appetite    Edema     Cough    Abdominal Pain     Sputum    Joint Pain     SOB/GASPAR    Pruritis/Rash     Nausea/vomit    Tolerating PT/OT     Diarrhea    Tolerating Diet     Constipation    Other       Could NOT obtain due to:      Objective:     VITALS:   Last 24hrs VS reviewed since prior progress note. Most recent are:  Patient Vitals for the past 24 hrs:   Temp Pulse Resp BP SpO2   10/23/21 1353 98.5 °F (36.9 °C) 99 18 103/65 97 %   10/23/21 0821 97.8 °F (36.6 °C) 91 18 (!) 112/57 95 %   10/23/21 0250 98 °F (36.7 °C) 94 18 107/64 94 %   10/22/21 2031 98.2 °F (36.8 °C) 90 18 (!) 104/54 98 %   10/22/21 1911 98.8 °F (37.1 °C) (!) 103 20 101/60 97 %       Intake/Output Summary (Last 24 hours) at 10/23/2021 1805  Last data filed at 10/22/2021 1819  Gross per 24 hour   Intake 1501.67 ml   Output    Net 1501.67 ml        I had a face to face encounter and independently examined this patient on 10/23/2021, as outlined below:  PHYSICAL EXAM:  General: WD, WN. Alert, cooperative, no acute distress    EENT:  EOMI. Anicteric sclerae. MMM  Resp:  CTA bilaterally, no wheezing or rales. No accessory muscle use  CV:  Regular  rhythm,  No edema  GI:  Gravid uterus   Neurologic:  Alert and oriented X 3, normal speech,   Psych:   Good insight. Not anxious nor agitated  Skin:  No rashes.   No jaundice    Reviewed most current lab test results and cultures  YES  Reviewed most current radiology test results   YES  Review and summation of old records today    NO  Reviewed patient's current orders and MAR    YES  PMH/SH reviewed - no change compared to H&P  ________________________________________________________________________  Care Plan discussed with:    Comments   Patient x    Family      RN     Care Manager     Consultant                        Multidiciplinary team rounds were held today with , nursing, pharmacist and clinical coordinator. Patient's plan of care was discussed; medications were reviewed and discharge planning was addressed. ________________________________________________________________________  Total NON critical care TIME:  35   Minutes    Total CRITICAL CARE TIME Spent:   Minutes non procedure based      Comments   >50% of visit spent in counseling and coordination of care x    ________________________________________________________________________  Derrick Gregorio MD     Procedures: see electronic medical records for all procedures/Xrays and details which were not copied into this note but were reviewed prior to creation of Plan. LABS:  I reviewed today's most current labs and imaging studies.   Pertinent labs include:  Recent Labs     10/23/21  0030 10/22/21  1019   WBC 7.9 8.2   HGB 9.0* 10.3*   HCT 26.6* 30.7*    190     Recent Labs     10/23/21  0030 10/22/21  1019    138   K 3.3* 3.6   * 113*   CO2 13* 13*   GLU 93 88   BUN 4* 4*   CREA 0.46* 0.53*   CA 8.1* 8.4*   MG  --  1.7   ALB 2.1* 2.3*   TBILI 1.0 1.0   ALT 70 48   INR 1.0  --        Signed: Derrick Gregorio MD

## 2021-10-23 NOTE — PROGRESS NOTES
Ante Partum Progress Note    Shahida Mcgregor  27w3d    Assessment: 27w3d    s/o  x3; benign PNC  [facility] [address] positive 7 days ago; s/p monoclonal antibodies; admitted on 4l O2 with inability to tolerate po; now on 2liters O2 and feeling much better  Reassuring fetal surveillance    Plan:  Continue twice daily fetal monitoring; remainder of Covid management per hospitalist; anticipate discharge home in near future    Orders/Charges: Medium    Patient states she has no new complaints    Vitals:  Visit Vitals  BP (!) 112/57 (BP 1 Location: Right arm, BP Patient Position: At rest)   Pulse 91   Temp 97.8 °F (36.6 °C)   Resp 18   Ht 5' 7.5\" (1.715 m)   Wt 205 lb 4 oz (93.1 kg)   LMP 2021 (Exact Date)   SpO2 95%   BMI 31.67 kg/m²     Temp (24hrs), Av.2 °F (36.8 °C), Min:97.8 °F (36.6 °C), Max:98.8 °F (37.1 °C)      Last 24hr Input/Output:    Intake/Output Summary (Last 24 hours) at 10/23/2021 1022  Last data filed at 10/22/2021 1819  Gross per 24 hour   Intake 1501.67 ml   Output    Net 1501.67 ml        Non stress test:  Reassuring for gestational age; monitored for 40minutes; baseline 80; age appropriate BTBV, accels; no decels or contractions    No data found. No data found. Uterine Activity: None     Exam:  Patient without distress.      Abdomen, fundus soft non-tender     Extremities, no redness or tenderness               Additional Exam: Patient without distress and Abdomen soft, non-tender    Labs:     Lab Results   Component Value Date/Time    WBC 7.9 10/23/2021 12:30 AM    WBC 8.2 10/22/2021 10:19 AM    WBC 10.7 2021 03:56 PM    WBC 6.7 2020 11:28 AM    WBC 6.6 2020 10:18 AM    WBC 6.8 2018 09:07 AM    WBC 10.3 2018 06:50 AM    WBC 10.1 2018 02:17 PM    WBC 12.0 (H) 2017 04:49 PM    WBC 11.5 (H) 2015 11:15 PM    WBC 11.1 (H) 2015 10:25 AM    WBC 10.9 2015 09:45 AM    WBC 13.1 (H) 2014 03:16 PM    WBC 7.5 02/04/2014 04:09 PM    WBC 5.2 11/26/2011 11:20 AM    WBC 7.2 08/31/2011 02:10 AM    WBC 15.9 (H) 10/04/2010 02:50 PM    HGB 9.0 (L) 10/23/2021 12:30 AM    HGB 10.3 (L) 10/22/2021 10:19 AM    HGB 12.9 06/03/2021 03:56 PM    HGB 14.3 11/04/2020 11:28 AM    HGB 13.9 05/20/2020 10:18 AM    HGB 14.4 11/07/2018 09:07 AM    HGB 11.5 04/13/2018 06:50 AM    HGB 10.6 (L) 01/24/2018 02:17 PM    HGB 13.1 09/11/2017 04:49 PM    HGB 11.2 (L) 07/02/2015 11:15 PM    HGB 11.1 04/14/2015 10:25 AM    HGB 11.0 (L) 04/12/2015 09:45 AM    HGB 13.2 11/26/2014 03:16 PM    HGB 13.5 02/04/2014 04:09 PM    HGB 14.4 11/26/2011 11:20 AM    HGB 13.7 08/31/2011 02:10 AM    HGB 12.8 10/04/2010 02:50 PM    HCT 26.6 (L) 10/23/2021 12:30 AM    HCT 30.7 (L) 10/22/2021 10:19 AM    HCT 40.1 06/03/2021 03:56 PM    HCT 43.1 11/04/2020 11:28 AM    HCT 42.0 05/20/2020 10:18 AM    HCT 42.5 11/07/2018 09:07 AM    HCT 34.6 (L) 04/13/2018 06:50 AM    HCT 32.6 (L) 01/24/2018 02:17 PM    HCT 38.8 09/11/2017 04:49 PM    HCT 34.2 (L) 07/02/2015 11:15 PM    HCT 32.9 (L) 04/14/2015 10:25 AM    HCT 34.4 (L) 04/12/2015 09:45 AM    HCT 39.8 11/26/2014 03:16 PM    HCT 40.6 02/04/2014 04:09 PM    HCT 41.5 11/26/2011 11:20 AM    HCT 39.8 08/31/2011 02:10 AM    HCT 36.4 10/04/2010 02:50 PM    PLATELET 617 05/72/3639 12:30 AM    PLATELET 289 46/21/9809 10:19 AM    PLATELET 823 96/84/2306 03:56 PM    PLATELET 066 29/09/9957 11:28 AM    PLATELET 355 34/43/0013 10:18 AM    PLATELET 796 27/91/6823 09:07 AM    PLATELET 253 57/09/0627 06:50 AM    PLATELET 000 45/49/4586 02:17 PM    PLATELET 273 76/54/2345 04:49 PM    PLATELET 932 37/24/2451 11:15 PM    PLATELET 566 87/45/0528 10:25 AM    PLATELET 810 49/71/6045 09:45 AM    PLATELET 831 84/06/0445 03:16 PM    PLATELET 693 09/92/5650 04:09 PM    PLATELET 325 81/74/8821 11:20 AM    PLATELET 996 32/51/1449 02:10 AM    PLATELET 059 50/99/3099 02:50 PM    Hgb, External 10.6 01/24/2018 12:00 AM    Hgb, External 13.1 09/11/2017 12:00 AM Hct, External 32.6 01/24/2018 12:00 AM    Hct, External 38.8 09/11/2017 12:00 AM    Platelet cnt., External 210 01/24/2018 12:00 AM    Platelet cnt., External 286 09/11/2017 12:00 AM       Recent Results (from the past 24 hour(s))   EKG, 12 LEAD, INITIAL    Collection Time: 10/22/21 11:57 AM   Result Value Ref Range    Ventricular Rate 95 BPM    Atrial Rate 95 BPM    P-R Interval 122 ms    QRS Duration 86 ms    Q-T Interval 368 ms    QTC Calculation (Bezet) 462 ms    Calculated P Axis 50 degrees    Calculated R Axis 63 degrees    Calculated T Axis 37 degrees    Diagnosis       Normal sinus rhythm  Nonspecific T wave abnormality  Prolonged QT  Abnormal ECG  When compared with ECG of 20-MAY-2020 09:40,  Nonspecific T wave abnormality, worse in Anterior leads     POC G3 - PUL    Collection Time: 10/22/21  1:40 PM   Result Value Ref Range    pH (POC) 7.35 7.35 - 7.45      pCO2 (POC) 26.6 (L) 35.0 - 45.0 MMHG    pO2 (POC) 117 (H) 80 - 100 MMHG    HCO3 (POC) 14.8 (L) 22 - 26 MMOL/L    sO2 (POC) 98.5 (H) 92 - 97 %    Base deficit (POC) 9.6 mmol/L    Site LEFT RADIAL      Allens test (POC) Positive      Specimen type (POC) ARTERIAL     CBC WITH AUTOMATED DIFF    Collection Time: 10/23/21 12:30 AM   Result Value Ref Range    WBC 7.9 3.6 - 11.0 K/uL    RBC 2.79 (L) 3.80 - 5.20 M/uL    HGB 9.0 (L) 11.5 - 16.0 g/dL    HCT 26.6 (L) 35.0 - 47.0 %    MCV 95.3 80.0 - 99.0 FL    MCH 32.3 26.0 - 34.0 PG    MCHC 33.8 30.0 - 36.5 g/dL    RDW 14.0 11.5 - 14.5 %    PLATELET 717 084 - 757 K/uL    MPV 9.2 8.9 - 12.9 FL    NRBC 0.0 0  WBC    ABSOLUTE NRBC 0.00 0.00 - 0.01 K/uL    NEUTROPHILS 66 32 - 75 %    BAND NEUTROPHILS 6 0 - 6 %    LYMPHOCYTES 14 12 - 49 %    MONOCYTES 7 5 - 13 %    EOSINOPHILS 0 0 - 7 %    BASOPHILS 1 0 - 1 %    METAMYELOCYTES 4 (H) 0 %    MYELOCYTES 2 (H) 0 %    IMMATURE GRANULOCYTES 0 %    ABS. NEUTROPHILS 5.7 1.8 - 8.0 K/UL    ABS. LYMPHOCYTES 1.1 0.8 - 3.5 K/UL    ABS.  MONOCYTES 0.6 0.0 - 1.0 K/UL ABS. EOSINOPHILS 0.0 0.0 - 0.4 K/UL    ABS. BASOPHILS 0.1 0.0 - 0.1 K/UL    ABS. IMM. GRANS. 0.0 K/UL    DF MANUAL      PLATELET COMMENTS CLUMPED PLATELETS      RBC COMMENTS MACROCYTOSIS  1+        RBC COMMENTS POLYCHROMASIA  PRESENT       METABOLIC PANEL, COMPREHENSIVE    Collection Time: 10/23/21 12:30 AM   Result Value Ref Range    Sodium 140 136 - 145 mmol/L    Potassium 3.3 (L) 3.5 - 5.1 mmol/L    Chloride 113 (H) 97 - 108 mmol/L    CO2 13 (LL) 21 - 32 mmol/L    Anion gap 14 5 - 15 mmol/L    Glucose 93 65 - 100 mg/dL    BUN 4 (L) 6 - 20 MG/DL    Creatinine 0.46 (L) 0.55 - 1.02 MG/DL    BUN/Creatinine ratio 9 (L) 12 - 20      GFR est AA >60 >60 ml/min/1.73m2    GFR est non-AA >60 >60 ml/min/1.73m2    Calcium 8.1 (L) 8.5 - 10.1 MG/DL    Bilirubin, total 1.0 0.2 - 1.0 MG/DL    ALT (SGPT) 70 12 - 78 U/L    AST (SGOT) 106 (H) 15 - 37 U/L    Alk.  phosphatase 85 45 - 117 U/L    Protein, total 5.9 (L) 6.4 - 8.2 g/dL    Albumin 2.1 (L) 3.5 - 5.0 g/dL    Globulin 3.8 2.0 - 4.0 g/dL    A-G Ratio 0.6 (L) 1.1 - 2.2     PROTHROMBIN TIME + INR    Collection Time: 10/23/21 12:30 AM   Result Value Ref Range    INR 1.0 0.9 - 1.1      Prothrombin time 10.0 9.0 - 11.1 sec   FIBRINOGEN    Collection Time: 10/23/21 12:30 AM   Result Value Ref Range    Fibrinogen 439 200 - 475 mg/dL   FERRITIN    Collection Time: 10/23/21 12:30 AM   Result Value Ref Range    Ferritin 219 26 - 388 NG/ML   D DIMER    Collection Time: 10/23/21 12:30 AM   Result Value Ref Range    D-dimer 2.11 (H) 0.00 - 0.65 mg/L FEU   LACTIC ACID    Collection Time: 10/23/21 12:30 AM   Result Value Ref Range    Lactic acid 0.7 0.4 - 2.0 MMOL/L   TROPONIN-HIGH SENSITIVITY    Collection Time: 10/23/21 12:30 AM   Result Value Ref Range    Troponin-High Sensitivity 9 0 - 51 ng/L

## 2021-10-24 LAB
ANION GAP SERPL CALC-SCNC: 6 MMOL/L (ref 5–15)
ATRIAL RATE: 95 BPM
BUN SERPL-MCNC: 4 MG/DL (ref 6–20)
BUN/CREAT SERPL: 7 (ref 12–20)
CALCIUM SERPL-MCNC: 7.9 MG/DL (ref 8.5–10.1)
CALCULATED P AXIS, ECG09: 50 DEGREES
CALCULATED R AXIS, ECG10: 63 DEGREES
CALCULATED T AXIS, ECG11: 37 DEGREES
CHLORIDE SERPL-SCNC: 115 MMOL/L (ref 97–108)
CO2 SERPL-SCNC: 20 MMOL/L (ref 21–32)
CREAT SERPL-MCNC: 0.55 MG/DL (ref 0.55–1.02)
D DIMER PPP FEU-MCNC: 2.18 MG/L FEU (ref 0–0.65)
DIAGNOSIS, 93000: NORMAL
GLUCOSE SERPL-MCNC: 104 MG/DL (ref 65–100)
P-R INTERVAL, ECG05: 122 MS
POTASSIUM SERPL-SCNC: 3.2 MMOL/L (ref 3.5–5.1)
PROCALCITONIN SERPL-MCNC: 0.18 NG/ML
Q-T INTERVAL, ECG07: 368 MS
QRS DURATION, ECG06: 86 MS
QTC CALCULATION (BEZET), ECG08: 462 MS
SODIUM SERPL-SCNC: 141 MMOL/L (ref 136–145)
VENTRICULAR RATE, ECG03: 95 BPM

## 2021-10-24 PROCEDURE — 65270000029 HC RM PRIVATE

## 2021-10-24 PROCEDURE — 74011250637 HC RX REV CODE- 250/637: Performed by: FAMILY MEDICINE

## 2021-10-24 PROCEDURE — 99231 SBSQ HOSP IP/OBS SF/LOW 25: CPT | Performed by: OBSTETRICS & GYNECOLOGY

## 2021-10-24 PROCEDURE — 74011250637 HC RX REV CODE- 250/637: Performed by: NURSE PRACTITIONER

## 2021-10-24 PROCEDURE — 74011250636 HC RX REV CODE- 250/636: Performed by: FAMILY MEDICINE

## 2021-10-24 PROCEDURE — 74011000250 HC RX REV CODE- 250: Performed by: FAMILY MEDICINE

## 2021-10-24 PROCEDURE — 74011250637 HC RX REV CODE- 250/637: Performed by: OBSTETRICS & GYNECOLOGY

## 2021-10-24 PROCEDURE — 84145 PROCALCITONIN (PCT): CPT

## 2021-10-24 PROCEDURE — 85379 FIBRIN DEGRADATION QUANT: CPT

## 2021-10-24 PROCEDURE — 74011250637 HC RX REV CODE- 250/637: Performed by: STUDENT IN AN ORGANIZED HEALTH CARE EDUCATION/TRAINING PROGRAM

## 2021-10-24 PROCEDURE — 36415 COLL VENOUS BLD VENIPUNCTURE: CPT

## 2021-10-24 PROCEDURE — 80048 BASIC METABOLIC PNL TOTAL CA: CPT

## 2021-10-24 PROCEDURE — 59025 FETAL NON-STRESS TEST: CPT | Performed by: OBSTETRICS & GYNECOLOGY

## 2021-10-24 RX ADMIN — DEXAMETHASONE 6 MG: 4 TABLET ORAL at 08:24

## 2021-10-24 RX ADMIN — Medication 10 ML: at 14:03

## 2021-10-24 RX ADMIN — ONDANSETRON 4 MG: 4 TABLET, ORALLY DISINTEGRATING ORAL at 08:25

## 2021-10-24 RX ADMIN — CEFEPIME HYDROCHLORIDE 2 G: 2 INJECTION, POWDER, FOR SOLUTION INTRAVENOUS at 06:13

## 2021-10-24 RX ADMIN — FERROUS SULFATE TAB 325 MG (65 MG ELEMENTAL FE) 325 MG: 325 (65 FE) TAB at 08:25

## 2021-10-24 RX ADMIN — FAMOTIDINE 20 MG: 20 TABLET ORAL at 20:13

## 2021-10-24 RX ADMIN — Medication 1 TABLET: at 08:25

## 2021-10-24 RX ADMIN — ONDANSETRON 4 MG: 4 TABLET, ORALLY DISINTEGRATING ORAL at 20:13

## 2021-10-24 RX ADMIN — CEFEPIME HYDROCHLORIDE 2 G: 2 INJECTION, POWDER, FOR SOLUTION INTRAVENOUS at 14:03

## 2021-10-24 RX ADMIN — ASPIRIN 81 MG: 81 TABLET, COATED ORAL at 08:25

## 2021-10-24 RX ADMIN — OXYCODONE HYDROCHLORIDE AND ACETAMINOPHEN 500 MG: 500 TABLET ORAL at 17:15

## 2021-10-24 RX ADMIN — POTASSIUM BICARBONATE 50 MEQ: 391 TABLET, EFFERVESCENT ORAL at 08:52

## 2021-10-24 RX ADMIN — FAMOTIDINE 20 MG: 20 TABLET ORAL at 08:25

## 2021-10-24 RX ADMIN — OXYCODONE HYDROCHLORIDE AND ACETAMINOPHEN 500 MG: 500 TABLET ORAL at 08:25

## 2021-10-24 NOTE — PROGRESS NOTES
Hospitalist Progress Note    NAME: Rosio Matos   :  1990   MRN:  [id number]     Summary:   Patient with IUP at 27 weeks presents to the hospital with nausea vomiting, shortness of breath, and loss of appetite that is started about 8 days back. She tested positive for Covid about 6 days back. Patient did receive monoclonal antibodies on an outpatient. Patient was found to be hypoxic in the ER. Assessment / Plan:    Acute hypoxic respiratory failure  COVID-19 pneumonia  -Tested positive for Covid a week PTA  -Had received monoclonal antibodies as an outpatient  -Hypoxic in ED and placed on 4 L nasal cannula, weaned down to 2 L today  -Chest x-ray showed bilateral pulmonary infiltrates  -CRP elevated at 5.   -Continue dexamethasone  -ON cefepime empirically, PCT is low. Will discontinue antibiotics  -Continue to wean down supplemental oxygen as tolerated, follow inflammatory markers including CRP. Metabolic acidosis secondary to starvation ketoacidosis  Nausea and vomiting - improved  -Due to poor oral intake, nausea, and vomiting related to COVID-19 infection  -Bicarb low at 13 on admission. ABG shows respiratory compensation.  -Beta hydroxybutyrate elevated at 3.9, urine positive for ketones  -S/p 50 mEq of sodium bicarb in ED without improvement in bicarb level.    -Started on D5NS. Acidosis improving with HCo2 at 20 today. Continue to monitor and discontinue IVF tomorrow if acidosis resolves to avoid volume overload. -Continue symptomatic management for nausea and vomiting with as needed Phenergan/zofran. Hypokalemia  -Replete and monitor     Normocytic anemia  -Continue iron sulfate. IUP at 27 weeks  -Managed by primary      30.0 - 39.9 Obese / Body mass index is 33.5 kg/m².     Estimated discharge date: 10/25  Barriers:    Code status: Full  Prophylaxis: Per primary  Recommended Disposition: Home w/Family     Subjective:     Chief Complaint / Reason for Physician Visit  Discussed with RN events overnight. Feeling better every day. Nausea and vomiting have subsided. Tolerating diet. On 2L NC    Review of Systems:  Symptom Y/N Comments  Symptom Y/N Comments   Fever/Chills    Chest Pain     Poor Appetite    Edema     Cough    Abdominal Pain     Sputum    Joint Pain     SOB/GASPAR    Pruritis/Rash     Nausea/vomit    Tolerating PT/OT     Diarrhea    Tolerating Diet     Constipation    Other       Could NOT obtain due to:      Objective:     VITALS:   Last 24hrs VS reviewed since prior progress note. Most recent are:  Patient Vitals for the past 24 hrs:   Temp Pulse Resp BP SpO2   10/24/21 1550 97.2 °F (36.2 °C) 95 18 108/72 97 %   10/24/21 0821 97.6 °F (36.4 °C) 94 18 100/60 94 %   10/24/21 0325 97.9 °F (36.6 °C) 92 18 (!) 100/48 98 %   10/23/21 2147 98.2 °F (36.8 °C) 97 18 112/69 97 %     No intake or output data in the 24 hours ending 10/24/21 1604     I had a face to face encounter and independently examined this patient on 10/24/2021, as outlined below:  PHYSICAL EXAM:  General: WD, WN. Alert, cooperative, no acute distress    EENT:  EOMI. Anicteric sclerae. MMM  Resp:  CTA bilaterally, no wheezing or rales. No accessory muscle use  CV:  Regular  rhythm,  No edema  GI:  Gravid uterus   Neurologic:  Alert and oriented X 3, normal speech,   Psych:   Good insight. Not anxious nor agitated  Skin:  No rashes. No jaundice    Reviewed most current lab test results and cultures  YES  Reviewed most current radiology test results   YES  Review and summation of old records today    NO  Reviewed patient's current orders and MAR    YES  PMH/SH reviewed - no change compared to H&P  ________________________________________________________________________  Care Plan discussed with:    Comments   Patient x    Family      RN     Care Manager     Consultant                        Multidiciplinary team rounds were held today with , nursing, pharmacist and clinical coordinator. Patient's plan of care was discussed; medications were reviewed and discharge planning was addressed. ________________________________________________________________________  Total NON critical care TIME:  35   Minutes    Total CRITICAL CARE TIME Spent:   Minutes non procedure based      Comments   >50% of visit spent in counseling and coordination of care x    ________________________________________________________________________  Laura Kimble MD     Procedures: see electronic medical records for all procedures/Xrays and details which were not copied into this note but were reviewed prior to creation of Plan. LABS:  I reviewed today's most current labs and imaging studies.   Pertinent labs include:  Recent Labs     10/23/21  0030 10/22/21  1019   WBC 7.9 8.2   HGB 9.0* 10.3*   HCT 26.6* 30.7*    190     Recent Labs     10/24/21  0035 10/23/21  0030 10/22/21  1019    140 138   K 3.2* 3.3* 3.6   * 113* 113*   CO2 20* 13* 13*   * 93 88   BUN 4* 4* 4*   CREA 0.55 0.46* 0.53*   CA 7.9* 8.1* 8.4*   MG  --   --  1.7   ALB  --  2.1* 2.3*   TBILI  --  1.0 1.0   ALT  --  70 48   INR  --  1.0  --        Signed: Laura Kimble MD

## 2021-10-24 NOTE — PROGRESS NOTES
Ante Partum Progress Note    Apolonia Ramirez  34S6D    Assessment: 27w4d  s/o  x3; benign PNC Dr Jesús Padilla at [address] [place] [address] positive 7 days ago; s/p monoclonal antibodies; admitted on 4l O2 with inability to tolerate po; now on 2liters O2 and feeling much better  Reassuring fetal surveillance       Plan:  Sincerely appreciate hospitalist 's management. Stable from OB standpoint; NSTs decreased to daily    Orders/Charges: Low and Non Stress Test    Patient states she has no new complaints. Feeling continued improvement    Vitals:  Visit Vitals  /60 (BP 1 Location: Left arm, BP Patient Position: At rest)   Pulse 94   Temp 97.6 °F (36.4 °C)   Resp 18   Ht 5' 7.5\" (1.715 m)   Wt 217 lb 1.6 oz (98.5 kg)   LMP 2021 (Exact Date)   SpO2 94%   BMI 33.50 kg/m²     Temp (24hrs), Av.1 °F (36.7 °C), Min:97.6 °F (36.4 °C), Max:98.5 °F (36.9 °C)      Last 24hr Input/Output:  No intake or output data in the 24 hours ending 10/24/21 1131     NST procedure note    Apolonia Ramirez is a ,  32 y.o. female BLACK/ whose Patient's last menstrual period was 2021 (exact date). was on  who presents for fetal non-stress test.    She is 27w4d and was monitored for 40 minutes and the FHR was reassuring for gestational age  NST Interpretation:    FHR baseline 140 bpm, variability mild, accelerations present, decelerations Absent. Uterine contractions were absent. Reba Seo was informed of the NST results and her questions were answered. No data found. No data found. Exam:  Patient without distress.      Abdomen, fundus soft non-tender     Extremities, no redness or tenderness                    Labs:     Lab Results   Component Value Date/Time    WBC 7.9 10/23/2021 12:30 AM    WBC 8.2 10/22/2021 10:19 AM    WBC 10.7 2021 03:56 PM    WBC 6.7 2020 11:28 AM    WBC 6.6 2020 10:18 AM    WBC 6.8 2018 09:07 AM    WBC 10.3 2018 06:50 AM WBC 10.1 01/24/2018 02:17 PM    WBC 12.0 (H) 09/11/2017 04:49 PM    WBC 11.5 (H) 07/02/2015 11:15 PM    WBC 11.1 (H) 04/14/2015 10:25 AM    WBC 10.9 04/12/2015 09:45 AM    WBC 13.1 (H) 11/26/2014 03:16 PM    WBC 7.5 02/04/2014 04:09 PM    WBC 5.2 11/26/2011 11:20 AM    WBC 7.2 08/31/2011 02:10 AM    WBC 15.9 (H) 10/04/2010 02:50 PM    HGB 9.0 (L) 10/23/2021 12:30 AM    HGB 10.3 (L) 10/22/2021 10:19 AM    HGB 12.9 06/03/2021 03:56 PM    HGB 14.3 11/04/2020 11:28 AM    HGB 13.9 05/20/2020 10:18 AM    HGB 14.4 11/07/2018 09:07 AM    HGB 11.5 04/13/2018 06:50 AM    HGB 10.6 (L) 01/24/2018 02:17 PM    HGB 13.1 09/11/2017 04:49 PM    HGB 11.2 (L) 07/02/2015 11:15 PM    HGB 11.1 04/14/2015 10:25 AM    HGB 11.0 (L) 04/12/2015 09:45 AM    HGB 13.2 11/26/2014 03:16 PM    HGB 13.5 02/04/2014 04:09 PM    HGB 14.4 11/26/2011 11:20 AM    HGB 13.7 08/31/2011 02:10 AM    HGB 12.8 10/04/2010 02:50 PM    HCT 26.6 (L) 10/23/2021 12:30 AM    HCT 30.7 (L) 10/22/2021 10:19 AM    HCT 40.1 06/03/2021 03:56 PM    HCT 43.1 11/04/2020 11:28 AM    HCT 42.0 05/20/2020 10:18 AM    HCT 42.5 11/07/2018 09:07 AM    HCT 34.6 (L) 04/13/2018 06:50 AM    HCT 32.6 (L) 01/24/2018 02:17 PM    HCT 38.8 09/11/2017 04:49 PM    HCT 34.2 (L) 07/02/2015 11:15 PM    HCT 32.9 (L) 04/14/2015 10:25 AM    HCT 34.4 (L) 04/12/2015 09:45 AM    HCT 39.8 11/26/2014 03:16 PM    HCT 40.6 02/04/2014 04:09 PM    HCT 41.5 11/26/2011 11:20 AM    HCT 39.8 08/31/2011 02:10 AM    HCT 36.4 10/04/2010 02:50 PM    PLATELET 446 48/62/7128 12:30 AM    PLATELET 343 85/12/9292 10:19 AM    PLATELET 061 41/30/5617 03:56 PM    PLATELET 464 30/35/3952 11:28 AM    PLATELET 765 69/14/9072 10:18 AM    PLATELET 176 17/05/3376 09:07 AM    PLATELET 061 17/40/0666 06:50 AM    PLATELET 489 36/41/2362 02:17 PM    PLATELET 336 81/01/8986 04:49 PM    PLATELET 257 24/28/4593 11:15 PM    PLATELET 255 58/70/3141 10:25 AM    PLATELET 549 05/23/3227 09:45 AM    PLATELET 902 95/18/8573 03:16 PM    PLATELET 296 02/04/2014 04:09 PM    PLATELET 483 59/61/3967 11:20 AM    PLATELET 621 00/76/9458 02:10 AM    PLATELET 450 85/99/1592 02:50 PM    Hgb, External 10.6 01/24/2018 12:00 AM    Hgb, External 13.1 09/11/2017 12:00 AM    Hct, External 32.6 01/24/2018 12:00 AM    Hct, External 38.8 09/11/2017 12:00 AM    Platelet cnt., External 210 01/24/2018 12:00 AM    Platelet cnt., External 286 09/11/2017 12:00 AM       Recent Results (from the past 24 hour(s))   URINALYSIS W/ REFLEX CULTURE    Collection Time: 10/23/21  2:23 PM    Specimen: Urine   Result Value Ref Range    Color YELLOW/STRAW      Appearance CLEAR CLEAR      Specific gravity 1.007 1.003 - 1.030      pH (UA) 6.5 5.0 - 8.0      Protein 30 (A) NEG mg/dL    Glucose Negative NEG mg/dL    Ketone 80 (A) NEG mg/dL    Bilirubin Negative NEG      Blood Negative NEG      Urobilinogen 1.0 0.2 - 1.0 EU/dL    Nitrites Negative NEG      Leukocyte Esterase Negative NEG      UA:UC IF INDICATED CULTURE NOT INDICATED BY UA RESULT CNI      WBC 0-4 0 - 4 /hpf    RBC 0-5 0 - 5 /hpf    Epithelial cells FEW FEW /lpf    Bacteria Negative NEG /hpf    Hyaline cast 2-5 0 - 5 /lpf   METABOLIC PANEL, BASIC    Collection Time: 10/24/21 12:35 AM   Result Value Ref Range    Sodium 141 136 - 145 mmol/L    Potassium 3.2 (L) 3.5 - 5.1 mmol/L    Chloride 115 (H) 97 - 108 mmol/L    CO2 20 (L) 21 - 32 mmol/L    Anion gap 6 5 - 15 mmol/L    Glucose 104 (H) 65 - 100 mg/dL    BUN 4 (L) 6 - 20 MG/DL    Creatinine 0.55 0.55 - 1.02 MG/DL    BUN/Creatinine ratio 7 (L) 12 - 20      GFR est AA >60 >60 ml/min/1.73m2    GFR est non-AA >60 >60 ml/min/1.73m2    Calcium 7.9 (L) 8.5 - 10.1 MG/DL   D DIMER    Collection Time: 10/24/21 12:35 AM   Result Value Ref Range    D-dimer 2.18 (H) 0.00 - 0.65 mg/L FEU   PROCALCITONIN    Collection Time: 10/24/21 12:35 AM   Result Value Ref Range    Procalcitonin 0.18 ng/mL

## 2021-10-25 LAB
ANION GAP SERPL CALC-SCNC: 5 MMOL/L (ref 5–15)
BUN SERPL-MCNC: 4 MG/DL (ref 6–20)
BUN/CREAT SERPL: 9 (ref 12–20)
CALCIUM SERPL-MCNC: 7.3 MG/DL (ref 8.5–10.1)
CHLORIDE SERPL-SCNC: 113 MMOL/L (ref 97–108)
CO2 SERPL-SCNC: 22 MMOL/L (ref 21–32)
CREAT SERPL-MCNC: 0.45 MG/DL (ref 0.55–1.02)
CRP SERPL-MCNC: <0.29 MG/DL (ref 0–0.6)
GLUCOSE SERPL-MCNC: 88 MG/DL (ref 65–100)
MAGNESIUM SERPL-MCNC: 1.5 MG/DL (ref 1.6–2.4)
POTASSIUM SERPL-SCNC: 3.2 MMOL/L (ref 3.5–5.1)
SODIUM SERPL-SCNC: 140 MMOL/L (ref 136–145)

## 2021-10-25 PROCEDURE — 74011250637 HC RX REV CODE- 250/637: Performed by: FAMILY MEDICINE

## 2021-10-25 PROCEDURE — 74011250637 HC RX REV CODE- 250/637: Performed by: NURSE PRACTITIONER

## 2021-10-25 PROCEDURE — 59025 FETAL NON-STRESS TEST: CPT | Performed by: OBSTETRICS & GYNECOLOGY

## 2021-10-25 PROCEDURE — 99231 SBSQ HOSP IP/OBS SF/LOW 25: CPT | Performed by: OBSTETRICS & GYNECOLOGY

## 2021-10-25 PROCEDURE — 86140 C-REACTIVE PROTEIN: CPT

## 2021-10-25 PROCEDURE — 74011250636 HC RX REV CODE- 250/636: Performed by: INTERNAL MEDICINE

## 2021-10-25 PROCEDURE — 74011250636 HC RX REV CODE- 250/636: Performed by: FAMILY MEDICINE

## 2021-10-25 PROCEDURE — 83735 ASSAY OF MAGNESIUM: CPT

## 2021-10-25 PROCEDURE — 74011250637 HC RX REV CODE- 250/637: Performed by: OBSTETRICS & GYNECOLOGY

## 2021-10-25 PROCEDURE — 80048 BASIC METABOLIC PNL TOTAL CA: CPT

## 2021-10-25 PROCEDURE — 36415 COLL VENOUS BLD VENIPUNCTURE: CPT

## 2021-10-25 PROCEDURE — 65270000029 HC RM PRIVATE

## 2021-10-25 PROCEDURE — 74011250637 HC RX REV CODE- 250/637: Performed by: INTERNAL MEDICINE

## 2021-10-25 RX ORDER — MAGNESIUM SULFATE HEPTAHYDRATE 40 MG/ML
4 INJECTION, SOLUTION INTRAVENOUS ONCE
Status: COMPLETED | OUTPATIENT
Start: 2021-10-25 | End: 2021-10-25

## 2021-10-25 RX ORDER — FAMOTIDINE 20 MG/1
20 TABLET, FILM COATED ORAL ONCE
Status: COMPLETED | OUTPATIENT
Start: 2021-10-25 | End: 2021-10-25

## 2021-10-25 RX ORDER — GUAIFENESIN/DEXTROMETHORPHAN 100-10MG/5
10 SYRUP ORAL
Status: DISCONTINUED | OUTPATIENT
Start: 2021-10-25 | End: 2021-10-26 | Stop reason: HOSPADM

## 2021-10-25 RX ORDER — POTASSIUM CHLORIDE 750 MG/1
40 TABLET, FILM COATED, EXTENDED RELEASE ORAL EVERY 4 HOURS
Status: COMPLETED | OUTPATIENT
Start: 2021-10-25 | End: 2021-10-25

## 2021-10-25 RX ADMIN — ONDANSETRON 4 MG: 4 TABLET, ORALLY DISINTEGRATING ORAL at 21:23

## 2021-10-25 RX ADMIN — POTASSIUM CHLORIDE 40 MEQ: 750 TABLET, FILM COATED, EXTENDED RELEASE ORAL at 16:10

## 2021-10-25 RX ADMIN — MAGNESIUM SULFATE HEPTAHYDRATE 4 G: 40 INJECTION, SOLUTION INTRAVENOUS at 13:09

## 2021-10-25 RX ADMIN — ONDANSETRON 4 MG: 4 TABLET, ORALLY DISINTEGRATING ORAL at 08:30

## 2021-10-25 RX ADMIN — FAMOTIDINE 20 MG: 20 TABLET ORAL at 22:33

## 2021-10-25 RX ADMIN — Medication 10 ML: at 13:12

## 2021-10-25 RX ADMIN — FERROUS SULFATE TAB 325 MG (65 MG ELEMENTAL FE) 325 MG: 325 (65 FE) TAB at 08:25

## 2021-10-25 RX ADMIN — Medication 1 TABLET: at 08:24

## 2021-10-25 RX ADMIN — DEXAMETHASONE 6 MG: 4 TABLET ORAL at 08:25

## 2021-10-25 RX ADMIN — OXYCODONE HYDROCHLORIDE AND ACETAMINOPHEN 500 MG: 500 TABLET ORAL at 08:25

## 2021-10-25 RX ADMIN — POTASSIUM CHLORIDE 40 MEQ: 750 TABLET, FILM COATED, EXTENDED RELEASE ORAL at 12:11

## 2021-10-25 RX ADMIN — FAMOTIDINE 20 MG: 20 TABLET ORAL at 08:25

## 2021-10-25 RX ADMIN — ASPIRIN 81 MG: 81 TABLET, COATED ORAL at 08:25

## 2021-10-25 RX ADMIN — Medication 10 ML: at 21:24

## 2021-10-25 NOTE — PROGRESS NOTES
Hospitalist Progress Note    NAME: Ector Sharpe   :  1990   MRN:  [id number]     Summary:   Patient with IUP at 27 weeks presents to the hospital with nausea vomiting, shortness of breath, and loss of appetite that is started about 8 days back. She tested positive for Covid about 6 days back. Patient did receive monoclonal antibodies on an outpatient. Patient was found to be hypoxic in the ER. Assessment / Plan:    Acute hypoxic respiratory failure, resolved, now on RA  COVID-19 pneumonia  -Tested positive for Covid a week PTA  -Had received monoclonal antibodies as an outpatient  -can dc dexamethasone  -CRP now wnl  -PCT low, off antibiotics  Ok from hospital medicine standpoint for discharge (after IV mag and PO K given as ordered)    Metabolic acidosis secondary to starvation ketoacidosis improved  Nausea and vomiting - improved  -Due to poor oral intake, nausea, and vomiting related to COVID-19 infection  -it is also normal in pregnancy to have resp alkalosis and metabolic acidosis  -Bicarb low at 13 on admission. ABG showed respiratory compensation.  -Beta hydroxybutyrate elevated at 3.9, urine positive for ketones  -S/p 50 mEq of sodium bicarb in ED without improvement in bicarb level  -s/p D5NS  -Acidosis improving with HCo2 at 22 today (normal ~20 in pregnancy)  -Continue symptomatic management for nausea and vomiting with prn zofran  -QTc 462 on 10/22    Hypokalemia  Replete K  Replete mag  Recheck in AM if pt not discharged- o/w f/u closely OP to monitor    Normocytic anemia  -Continue iron sulfate    IUP at 27 weeks  -Managed by primary    Code status: Full  Recommended Disposition: Home w/Family     Subjective:     Chief Complaint / Reason for Physician Visit  No acute overnight events  Weaned off O2  C/o intermittent coughing  Feeling better overall  +fetal mvmt      Objective:     VITALS:   Last 24hrs VS reviewed since prior progress note.  Most recent are:  Patient Vitals for the past 24 hrs:   Temp Pulse Resp BP SpO2   10/25/21 0807 97.7 °F (36.5 °C) 89 18 (!) 99/59 96 %   10/25/21 0014  85 18 (!) 107/57 95 %   10/24/21 2023 98.4 °F (36.9 °C) 90 19 117/77 97 %   10/24/21 1550 97.2 °F (36.2 °C) 95 18 108/72 97 %     No intake or output data in the 24 hours ending 10/25/21 1054     I had a face to face encounter and independently examined this patient on 10/25/2021, as outlined below:  PHYSICAL EXAM:  General: WD, WN. Alert, cooperative, no acute distress    EENT:  EOMI. Anicteric sclerae  Resp:  CTA bilaterally, no wheezing or rales. No accessory muscle use  CV:  Regular rate, No edema  GI:  Gravid uterus   Neurologic:  Alert and oriented X 3, normal speech,   Psych:   Good insight. Not anxious or agitated  Skin:  No rashes. No jaundice    Reviewed most current lab test results and cultures  YES  Reviewed most current radiology test results   YES  Review and summation of old records today    NO  Reviewed patient's current orders and MAR    YES  PMH/SH reviewed - no change compared to H&P    Procedures: see electronic medical records for all procedures/Xrays and details which were not copied into this note but were reviewed prior to creation of Plan. LABS:  I reviewed today's most current labs and imaging studies.   Pertinent labs include:  Recent Labs     10/23/21  0030   WBC 7.9   HGB 9.0*   HCT 26.6*        Recent Labs     10/25/21  0018 10/24/21  0035 10/23/21  0030    141 140   K 3.2* 3.2* 3.3*   * 115* 113*   CO2 22 20* 13*   GLU 88 104* 93   BUN 4* 4* 4*   CREA 0.45* 0.55 0.46*   CA 7.3* 7.9* 8.1*   MG 1.5*  --   --    ALB  --   --  2.1*   TBILI  --   --  1.0   ALT  --   --  70   INR  --   --  1.0       Signed: Khurram Padilla MD

## 2021-10-25 NOTE — PROGRESS NOTES
Antepartum Progress Note     Nicholas Dorsey  80S7F    Assessment: 27w5d  s/o  x3; benign PNC  [facility] at [address] positive 8 days ago; s/p monoclonal antibodies; admitted on 4l O2 with inability to tolerate po; now on RA and feeling much better  Reassuring fetal surveillance. Cleared from a medicine standpoint following electrolyte repletions. Likely discharge home tomorrow if CMP improved.        Plan:  Sincerely appreciate hospitalist 's management. Stable from OB standpoint; NSTs decreased to daily     Orders/Charges: Non Stress Test     Patient states she has no new complaints. Feeling continued improvement     Visit Vitals  Patient Vitals for the past 12 hrs:   Temp Pulse Resp BP SpO2   10/25/21 1307 98.3 °F (36.8 °C) 98 18 114/74 96 %   10/25/21 0807 97.7 °F (36.5 °C) 89 18 (!) 99/59 96 %           Last 24hr Input/Output:  No intake or output data in the 24 hours ending 10/24/21 1131      NST procedure note     Heather Solomon is a ,  32 y.o. female BLACK/ whose Patient's last menstrual period was 2021 (exact date). was on  who presents for fetal non-stress test.     She is 27w4d and was monitored for 40 minutes and the FHR was reassuring for gestational age  NST Interpretation:     FHR baseline 150 bpm, variability moderate, accelerations present, decelerations Absent.  Reactive cat 1 tracing.     Uterine contractions were absent.     Heather was informed of the NST results and her questions were answered.       Recent Results (from the past 24 hour(s))   METABOLIC PANEL, BASIC    Collection Time: 10/25/21 12:18 AM   Result Value Ref Range    Sodium 140 136 - 145 mmol/L    Potassium 3.2 (L) 3.5 - 5.1 mmol/L    Chloride 113 (H) 97 - 108 mmol/L    CO2 22 21 - 32 mmol/L    Anion gap 5 5 - 15 mmol/L    Glucose 88 65 - 100 mg/dL    BUN 4 (L) 6 - 20 MG/DL    Creatinine 0.45 (L) 0.55 - 1.02 MG/DL    BUN/Creatinine ratio 9 (L) 12 - 20      GFR est AA >60 >60 ml/min/1.73m2    GFR est non-AA >60 >60 ml/min/1.73m2    Calcium 7.3 (L) 8.5 - 10.1 MG/DL   C REACTIVE PROTEIN, QT    Collection Time: 10/25/21 12:18 AM   Result Value Ref Range    C-Reactive protein <0.29 0.00 - 0.60 mg/dL   MAGNESIUM    Collection Time: 10/25/21 12:18 AM   Result Value Ref Range    Magnesium 1.5 (L) 1.6 - 2.4 mg/dL

## 2021-10-26 ENCOUNTER — TELEPHONE (OUTPATIENT)
Dept: OBGYN CLINIC | Age: 31
End: 2021-10-26

## 2021-10-26 VITALS
HEIGHT: 68 IN | BODY MASS INDEX: 32.9 KG/M2 | RESPIRATION RATE: 18 BRPM | TEMPERATURE: 97.4 F | OXYGEN SATURATION: 96 % | SYSTOLIC BLOOD PRESSURE: 114 MMHG | HEART RATE: 106 BPM | DIASTOLIC BLOOD PRESSURE: 69 MMHG | WEIGHT: 217.1 LBS

## 2021-10-26 LAB
ANION GAP SERPL CALC-SCNC: 6 MMOL/L (ref 5–15)
BUN SERPL-MCNC: 5 MG/DL (ref 6–20)
BUN/CREAT SERPL: 13 (ref 12–20)
CALCIUM SERPL-MCNC: 7.5 MG/DL (ref 8.5–10.1)
CHLORIDE SERPL-SCNC: 108 MMOL/L (ref 97–108)
CO2 SERPL-SCNC: 24 MMOL/L (ref 21–32)
CREAT SERPL-MCNC: 0.38 MG/DL (ref 0.55–1.02)
GLUCOSE SERPL-MCNC: 82 MG/DL (ref 65–100)
MAGNESIUM SERPL-MCNC: 2 MG/DL (ref 1.6–2.4)
POTASSIUM SERPL-SCNC: 3.5 MMOL/L (ref 3.5–5.1)
SODIUM SERPL-SCNC: 138 MMOL/L (ref 136–145)

## 2021-10-26 PROCEDURE — 36415 COLL VENOUS BLD VENIPUNCTURE: CPT

## 2021-10-26 PROCEDURE — 59025 FETAL NON-STRESS TEST: CPT | Performed by: OBSTETRICS & GYNECOLOGY

## 2021-10-26 PROCEDURE — 80048 BASIC METABOLIC PNL TOTAL CA: CPT

## 2021-10-26 PROCEDURE — 99238 HOSP IP/OBS DSCHRG MGMT 30/<: CPT | Performed by: OBSTETRICS & GYNECOLOGY

## 2021-10-26 PROCEDURE — 74011250637 HC RX REV CODE- 250/637: Performed by: OBSTETRICS & GYNECOLOGY

## 2021-10-26 PROCEDURE — 83735 ASSAY OF MAGNESIUM: CPT

## 2021-10-26 PROCEDURE — 74011250637 HC RX REV CODE- 250/637: Performed by: INTERNAL MEDICINE

## 2021-10-26 RX ORDER — ASPIRIN 81 MG/1
81 TABLET ORAL DAILY
Qty: 90 TABLET | Refills: 2 | Status: SHIPPED | OUTPATIENT
Start: 2021-10-26 | End: 2022-01-24

## 2021-10-26 RX ORDER — POTASSIUM CHLORIDE 750 MG/1
40 TABLET, FILM COATED, EXTENDED RELEASE ORAL ONCE
Status: COMPLETED | OUTPATIENT
Start: 2021-10-26 | End: 2021-10-26

## 2021-10-26 RX ADMIN — Medication 1 TABLET: at 09:53

## 2021-10-26 RX ADMIN — GUAIFENESIN AND DEXTROMETHORPHAN 10 ML: 100; 10 SYRUP ORAL at 07:33

## 2021-10-26 RX ADMIN — POTASSIUM CHLORIDE 40 MEQ: 750 TABLET, FILM COATED, EXTENDED RELEASE ORAL at 10:04

## 2021-10-26 RX ADMIN — FERROUS SULFATE TAB 325 MG (65 MG ELEMENTAL FE) 325 MG: 325 (65 FE) TAB at 09:52

## 2021-10-26 RX ADMIN — Medication 10 ML: at 07:33

## 2021-10-26 RX ADMIN — ASPIRIN 81 MG: 81 TABLET, COATED ORAL at 09:53

## 2021-10-26 NOTE — TELEPHONE ENCOUNTER
Patient advised of MD recommendations and was placed on the schedule for vv on 11/4/2021 at 3:00PM as per MD recommendations    Patient was provided with instructions for the tele health visit and is in the state of [address] [facility]. Patient verbalized understanding.

## 2021-10-26 NOTE — PROGRESS NOTES
Hospitalist Progress Note    NAME: Ruben Huerta   :  1990   MRN:  [id number]     Summary:   Patient with IUP at 27 weeks presents to the hospital with nausea vomiting, shortness of breath, and loss of appetite that is started about 8 days back. She tested positive for Covid about 6 days back. Patient did receive monoclonal antibodies on an outpatient. Patient was found to be hypoxic in the ER. Assessment / Plan:    Acute hypoxic respiratory failure, resolved, now on RA  COVID-19 pneumonia  -Tested positive for Covid a week PTA  -Had received monoclonal antibodies as an outpatient  -s/p dexamethasone  -CRP now wnl  -PCT low, off antibiotics  Ok from hospital medicine standpoint for discharge    Metabolic acidosis secondary to starvation ketoacidosis improved  Nausea and vomiting - improved  -Due to poor oral intake, nausea, and vomiting related to COVID-19 infection  -it is also normal in pregnancy to have resp alkalosis and metabolic acidosis  -Bicarb low at 13 on admission. ABG showed respiratory compensation.  -Beta hydroxybutyrate elevated at 3.9, urine positive for ketones  -S/p 50 mEq of sodium bicarb in ED without improvement in bicarb level  -s/p D5NS  -Acidosis improving with HCo2 at 24 today (normal ~20 in pregnancy)  -Continue symptomatic management for nausea and vomiting with prn zofran  -QTc 462 on 10/22    Hypokalemia, improving  Replete K  Mag repleted    Normocytic anemia  -Continue iron sulfate    IUP at 27 weeks  -Managed by primary    Code status: Full  Recommended Disposition: Home w/Family     Subjective:     Chief Complaint / Reason for Physician Visit  No acute overnight events  Weaned off O2  C/o intermittent coughing  Feeling better overall  +fetal mvmt      Objective:     VITALS:   Last 24hrs VS reviewed since prior progress note.  Most recent are:  Patient Vitals for the past 24 hrs:   Temp Pulse Resp BP SpO2   10/26/21 0823 97.4 °F (36.3 °C) (!) 106 18 114/69 96 % 10/26/21 0224 97.9 °F (36.6 °C) 91 16 (!) 94/56 93 %   10/25/21 2003 97.5 °F (36.4 °C) 96 18 107/70 96 %   10/25/21 1307 98.3 °F (36.8 °C) 98 18 114/74 96 %     No intake or output data in the 24 hours ending 10/26/21 0938     I had a face to face encounter and independently examined this patient on 10/26/2021, as outlined below:  PHYSICAL EXAM:  General: WD, WN. Alert, cooperative, no acute distress    EENT:  EOMI. Anicteric sclerae  Resp:  CTA bilaterally, no wheezing or rales. No accessory muscle use  CV:  Regular rate, No edema  GI:  Gravid uterus   Neurologic:  Alert and oriented X 3, normal speech,   Psych:   Good insight. Not anxious or agitated  Skin:  No rashes. No jaundice    Reviewed most current lab test results and cultures  YES  Reviewed most current radiology test results   YES  Review and summation of old records today    NO  Reviewed patient's current orders and MAR    YES  PMH/SH reviewed - no change compared to H&P    Procedures: see electronic medical records for all procedures/Xrays and details which were not copied into this note but were reviewed prior to creation of Plan. LABS:  I reviewed today's most current labs and imaging studies. Pertinent labs include:  No results for input(s): WBC, HGB, HCT, PLT, HGBEXT, HCTEXT, PLTEXT, HGBEXT, HCTEXT, PLTEXT in the last 72 hours.   Recent Labs     10/26/21  0159 10/25/21  0018 10/24/21  0035    140 141   K 3.5 3.2* 3.2*    113* 115*   CO2 24 22 20*   GLU 82 88 104*   BUN 5* 4* 4*   CREA 0.38* 0.45* 0.55   CA 7.5* 7.3* 7.9*   MG 2.0 1.5*  --        Signed: Valeriano Bhandari MD

## 2021-10-26 NOTE — PROGRESS NOTES
I have reviewed discharge instructions with the patient. The patient verbalized understanding. Belongings reviewed and sent. Left via w/c to private car, father to drive home.

## 2021-10-26 NOTE — TELEPHONE ENCOUNTER
Call received at 9:00am      32year old patient  31w5d pregnant patient last seen in the office on 2021  Patient currently hospitalized with covid 19 complications and states she will be discharged to day      Patient reports she still has some lingering covid 19 symptoms and will not be able to come for her office appointment on 10/28/2021 and was rescheduled for 11/3/2021 at 3:40PM    Patient will come around 2:00PM to start the glucola testing      Patient is requesting a work note for when she was out 10/18/34173 - 10 /. Patient states she works from home .     Please advised if ok to write note        [address] patient     Thank you

## 2021-10-26 NOTE — TELEPHONE ENCOUNTER
MD Lavonne Antunez, SEBASTIAN  Cc: Sammy Cluster  Caller: Unspecified (Today,  9:05 AM)  She was hospitalized with severe disease. Make sure its been 20 days since her onset of symptoms before she comes to the office. Severe disease is 20 days not 10 days           Previous Messages  Patient advised of MD recommendations and patient was rescheduled for 11/10/2021 at 1:20PM      Patient verbalized understanding.

## 2021-10-26 NOTE — PROGRESS NOTES
Physician Progress Note      Ronda De León  [facility] #:                  [id number]  :                       1990  ADMIT DATE:       10/22/2021 9:28 AM  DISCH DATE:  RESPONDING  PROVIDER #:        Zoya Esteban MD          QUERY TEXT:    Dear Hospitalist,    Patient admitted with COVID PNA. Noted documentation of acute respiratory failure in Hospitalist notes. Pt noted to have SOB with pulse ox of 94-96 percent on 4 LPM NC max O2 during admission. In order to support the diagnosis of acute respiratory failure, please include additional clinical indicators of hypoxia in your documentation. Or please document if the diagnosis of acute respiratory failure has been ruled out after further study.     The medical record reflects the following:    Risk Factors: COVID PNA    Clinical Indicators:  -- Pulse ox = 96% on admission on 4 LPM; pulse ox = 94-98% during admission, max O2 = 4 LPM NC, pt on RA as of 10/24  -- ABG: pH = 7.35, pCO2 = 26.6, pO2 = 117 - no FiO2 noted, pt was on 4 LPM prior to and after ABG results - calculated p/f ratio = 325  -- ED noted respiratory failure & shortness of breath  -- Hospitalist notes documented shortness of breath PTA and hypoxia in the ED    Treatment: Supplemental O2, pulse oximetry monitoring, ABG testing, albuterol HFA once, dexamethasone IV (once) & then PO    Thank-you,  Masoud Barboza RN, [place]  Clinical   [phone]    Acute Respiratory Failure Clinical Indicators per [employer] [employer] Training Guide and Quick Reference Guide:  pO2 < 60 mmHg or SpO2 (pulse oximetry) < 91% breathing room air  pCO2 > 50 and pH < 7.35  P/F ratio (pO2 / FIO2) < 300  pO2 decrease or pCO2 increase by 10 mmHg from baseline (if known)  Supplemental oxygen of 40% or more  Presence of respiratory distress, tachypnea, dyspnea, shortness of breath, wheezing  Unable to speak in complete sentences  Use of accessory muscles to breathe  Extreme anxiety and feeling of impending doom  Tripod position  Confusion/altered mental status/obtunded  Options provided:  -- Acute hypoxic respiratory failure as evidenced by, Please document additional evidence of hypoxia.   -- Acute respiratory failure ruled out after study  -- Other - I will add my own diagnosis  -- Disagree - Not applicable / Not valid  -- Disagree - Clinically unable to determine / Unknown  -- Refer to Clinical Documentation Reviewer    PROVIDER RESPONSE TEXT:    This patient is in acute hypoxic respiratory failure as evidenced by required NC O2    Query created by: Christine Dee on 10/25/2021 6:35 PM      Electronically signed by:  Ulices Gottlieb MD 10/26/2021 9:38 AM

## 2021-10-26 NOTE — PROGRESS NOTES
Antepartum Progress Note     Mikel Saver  27w6d    Assessment: 27w6d  s/o  x3; benign PNC Dr Mildred Matthew at [address] [place] [address] positive 9 days ago; s/p monoclonal antibodies; admitted on 4l O2 with inability to tolerate po; now on Room Air and feeling much better, just mild residual cough, denies dyspnea. Reassuring fetal surveillance. Cleared from a medicine and obstetric standpoint for discharge.        Plan:  Sincerely appreciate hospitalist 's management. Stable from OB standpoint; NSTs decreased to daily     Orders/Charges: Non Stress Test     Patient states she has no new complaints. Feeling continued improvement     Visit Vitals  Patient Vitals for the past 24 hrs:   Temp Pulse Resp BP SpO2   10/26/21 0224 97.9 °F (36.6 °C) 91 16 (!) 94/56 93 %   10/25/21 2003 97.5 °F (36.4 °C) 96 18 107/70 96 %   10/25/21 1307 98.3 °F (36.8 °C) 98 18 114/74 96 %        Last 24hr Input/Output:  No intake or output data in the 24 hours ending 10/24/21 1131      NST procedure note from 10/26     Heather Reeder is a ,  32 y.o. female BLACK/ whose Patient's last menstrual period was 2021 (exact date). was on  who presents for fetal non-stress test.     She is 27w4d and was monitored for 40 minutes and the FHR was reassuring for gestational age  NST Interpretation:     FHR baseline 150 bpm, variability moderate, accelerations present, decelerations Absent.  Reactive cat 1 tracing.     Uterine contractions were absent.     Heather was informed of the NST results and her questions were answered.       Recent Results (from the past 24 hour(s))   METABOLIC PANEL, BASIC    Collection Time: 10/26/21  1:59 AM   Result Value Ref Range    Sodium 138 136 - 145 mmol/L    Potassium 3.5 3.5 - 5.1 mmol/L    Chloride 108 97 - 108 mmol/L    CO2 24 21 - 32 mmol/L    Anion gap 6 5 - 15 mmol/L    Glucose 82 65 - 100 mg/dL    BUN 5 (L) 6 - 20 MG/DL    Creatinine 0.38 (L) 0.55 - 1.02 MG/DL    BUN/Creatinine ratio 13 12 - 20      GFR est AA >60 >60 ml/min/1.73m2    GFR est non-AA >60 >60 ml/min/1.73m2    Calcium 7.5 (L) 8.5 - 10.1 MG/DL   MAGNESIUM    Collection Time: 10/26/21  1:59 AM   Result Value Ref Range    Magnesium 2.0 1.6 - 2.4 mg/dL

## 2021-10-26 NOTE — PROGRESS NOTES
Problem: Falls - Risk of  Goal: *Absence of Falls  Description: Document Claudia Cline Fall Risk and appropriate interventions in the flowsheet.   Outcome: Progressing Towards Goal  Note: Fall Risk Interventions:

## 2021-10-26 NOTE — DISCHARGE INSTRUCTIONS
Patient Education   Patient Education        Learning About Coronavirus ([phone])  What is coronavirus (COVID-19)? COVID-19 is a disease caused by a type of coronavirus. This illness was first found in December 2019. It has since spread worldwide. Coronaviruses are a large group of viruses. They cause the common cold. They also cause more serious illnesses like Middle East respiratory syndrome (MERS) and severe acute respiratory syndrome (SARS). COVID-19 is caused by a novel coronavirus. That means it's a new type that has not been seen in people before. What are the symptoms? COVID-19 symptoms may include:  · Fever. · Cough. · Trouble breathing. · Chills or repeated shaking with chills. · Muscle and body aches. · Headache. · Sore throat. · New loss of taste or smell. · Vomiting. · Diarrhea. In severe cases, COVID-19 can cause pneumonia and make it hard to breathe without help from a machine. It can cause death. How is it diagnosed? COVID-19 is diagnosed with a viral test. This may also be called a PCR test or antigen test. It looks for evidence of the virus in your breathing passages or lungs (respiratory system). The test is most often done on a sample from the nose, throat, or lungs. It's sometimes done on a sample of saliva. One way a sample is collected is by putting a long swab into the back of your nose. How is it treated? Mild cases of COVID-19 can be treated at home. Serious cases need treatment in the hospital. Treatment may include medicines to reduce symptoms, plus breathing support such as oxygen therapy or a ventilator. Some people may be placed on their belly to help their oxygen levels. Treatments that may help people who have COVID-19 include:  Antiviral medicines. These medicines treat viral infections. Remdesivir is an example. Immune-based therapy. These medicines help the immune system fight COVID-19. Examples include monoclonal antibodies. Blood thinners.    These medicines help prevent blood clots. People with severe illness are at risk for blood clots. How can you protect yourself and others? The best way to protect yourself from getting sick is to:  · Get vaccinated. · Avoid sick people. · If you are not fully vaccinated:  ? Wear a mask if you have to go to public areas. ? Avoid crowds and try to stay at least 6 feet away from other people. · Cover your mouth with a tissue when you cough or sneeze. · Wash your hands often, especially after you cough or sneeze. Use soap and water, and scrub for at least 20 seconds. If soap and water aren't available, use an alcohol-based hand . · Avoid touching your mouth, nose, and eyes. To help avoid spreading the virus to others:  · Get vaccinated. · Cover your mouth with a tissue when you cough or sneeze. · Wash your hands often, especially after you cough or sneeze. Use soap and water, and scrub for at least 20 seconds. If soap and water aren't available, use an alcohol-based hand . · If you have been exposed to the virus and are not fully vaccinated:  ? Stay home. Don't go to school, work, or public areas. And don't use public transportation, ride-shares, or taxis unless you have no choice. ? Wear a mask if you have to go to public areas, like the pharmacy. · If you're sick:  ? Leave your home only if you need to get medical care. But call the doctor's office first so they know you're coming. And wear a mask. ? Wear a mask whenever you're around other people. ? Limit contact with pets and people in your home. If possible, stay in a separate bedroom and use a separate bathroom. ? Clean and disinfect your home every day. Use household  and disinfectant wipes or sprays. Take special care to clean things that you touch with your hands. How can you self-isolate when you have COVID-19? If you have COVID-19, there are things you can do to help avoid spreading the virus to others.   · Limit contact with people in your home. If possible, stay in a separate bedroom and use a separate bathroom. · Wear a mask when you are around other people. · If you have to leave home, avoid crowds and try to stay at least 6 feet away from other people. · Avoid contact with pets and other animals. · Cover your mouth and nose with a tissue when you cough or sneeze. Then throw it in the trash right away. · Wash your hands often, especially after you cough or sneeze. Use soap and water, and scrub for at least 20 seconds. If soap and water aren't available, use an alcohol-based hand . · Don't share personal household items. These include bedding, towels, cups and glasses, and eating utensils. · [address] in the warmest water allowed for the fabric type, and dry it completely. It's okay to wash other people's laundry with yours. · Clean and disinfect your home. Use household  and disinfectant wipes or sprays. When should you call for help? Call 911 anytime you think you may need emergency care. For example, call if you have life-threatening symptoms, such as:    · You have severe trouble breathing. (You can't talk at all.)     · You have constant chest pain or pressure.     · You are severely dizzy or lightheaded.     · You are confused or can't think clearly.     · You have pale, gray, or blue-colored skin or lips.     · You pass out (lose consciousness) or are very hard to wake up. Call your doctor now or seek immediate medical care if:    · You have moderate trouble breathing. (You can't speak a full sentence.)     · You are coughing up blood (more than about 1 teaspoon).     · You have signs of low blood pressure. These include feeling lightheaded; being too weak to stand; and having cold, pale, clammy skin.    Watch closely for changes in your health, and be sure to contact your doctor if:    · Your symptoms get worse.     · You are not getting better as expected.     · You have new or worse symptoms of anxiety, depression, nightmares, or flashbacks. Call before you go to the doctor's office. Follow their instructions. And wear a mask. Current as of: July 1, 2021               Content Version: 13.0  © 2006-2021 Healthwise, [facility]. Care instructions adapted under license by [employer] (which disclaims liability or warranty for this information). If you have questions about a medical condition or this instruction, always ask your healthcare professional. [address] any warranty or liability for your use of this information.

## 2021-10-26 NOTE — DISCHARGE SUMMARY
Obstetrical Discharge Summary     Name: Yoan Leslie MRN: [id number]  SSN: [id number]    YOB: 1990  Age: 32 y.o. Sex: female      Admit Date: 10/22/2021    Discharge Date: 10/26/2021     Admitting Physician: Fawad Hutson MD     Attending Physician:  Nicholas Julio MD     Admission Diagnoses: COVID-19 affecting pregnancy in third trimester [O98.513, U07.1]    Discharge Diagnoses:  COVID 19 affecting pregnancy  This patient has no babies on file. Additional Diagnoses:   Hospital Problems  Date Reviewed: 2021        Codes Class Noted POA    COVID-19 affecting pregnancy in third trimester ICD-10-CM: O98.513, U07.1  ICD-9-CM: 647.63, 079.89  10/22/2021 Unknown             Lab Results   Component Value Date/Time    Rubella, External immune 2017 12:00 AM    GrBStrep, External Positive 2018 12:00 AM       Hospital Course: Pt is a 31 yo  admitted at 32 wks pregnant for management of acute covid 19 infection. s/p monoclonal antibodies; admitted on 4l O2 with inability to tolerate po; now on RA and feeling much better. Internal medicine consulted and followed pt throughout stay. Now stable for discharge home. Reassuring fetal surveillance throughout.  Discharged on baby ASA, vitC, vitD, zinc and advised f/u with Dr. Rogelio Corado, and ongoing monthly growth scans with  following discharge. Patient Instructions:   Current Discharge Medication List      START taking these medications    Details   aspirin delayed-release 81 mg tablet Take 1 Tablet by mouth daily for 90 days. Qty: 90 Tablet, Refills: 2         CONTINUE these medications which have NOT CHANGED    Details   ascorbic acid, vitamin C, (VITAMIN C) 500 mg tablet Take 1 Tablet by mouth daily. Qty: 30 Tablet, Refills: 2    Associated Diagnoses: Acute bronchitis due to infection;  Suspected COVID-19 virus infection      ondansetron (ZOFRAN ODT) 8 mg disintegrating tablet Take 1 Tablet by mouth every eight (8) hours as needed for Nausea or Vomiting. Qty: 30 Tablet, Refills: 4      prenatal vit-iron fumarate-fa 27 mg iron- 0.8 mg tab tablet Take 1 Tablet by mouth daily. Qty: 90 Tablet, Refills: 4         STOP taking these medications       diclofenac (VOLTAREN) 1 % gel Comments:   Reason for Stopping:               Reference my discharge instructions. Follow-up Appointments   Procedures    FOLLOW UP VISIT Appointment in: One Week With Dr. Melissa Florence, also advised monthly growth scan with      With Dr. Melissa Florence, also advised monthly growth scan with      Standing Status:   Standing     Number of Occurrences:   1     Order Specific Question:   Appointment in     Answer:    One Week        Signed By:  Drake Batista MD     2021

## 2021-10-26 NOTE — TELEPHONE ENCOUNTER
Letter composed as per work in MD,Dr. Amrita Guy, and will be printed, signed and attached to my chart message       Patient advised and verbalized understanding.

## 2021-10-26 NOTE — LETTER
10/26/2021 9:45 AM    Ms[address] [place] [id number]      To whom it may concern,    Ms [facility] is under my care for pregnancy with EDC of 1/19/2022. Patient has been out of work since 10/18/2021 through 86/79/5300 due to complications related to  covid 19. If you have any further questions , please have the patient to contact the office at [phone].     Thank you    Sincerely,      Ale Knowles MD

## 2021-10-26 NOTE — PROGRESS NOTES
Hospital follow-up Virtual PCP transitional care appointment has been scheduled with Dr. Hu Ford for Friday, 10/29/21 at 3:20 p.m. Pending patient discharge.   Nereida Galvin, Care Management Specialist.

## 2021-10-29 ENCOUNTER — APPOINTMENT (OUTPATIENT)
Dept: FAMILY MEDICINE CLINIC | Age: 31
End: 2021-10-29

## 2021-11-17 ENCOUNTER — ROUTINE PRENATAL (OUTPATIENT)
Dept: OBGYN CLINIC | Age: 31
End: 2021-11-17
Payer: MEDICAID

## 2021-11-17 VITALS — WEIGHT: 217 LBS | BODY MASS INDEX: 33.49 KG/M2 | DIASTOLIC BLOOD PRESSURE: 69 MMHG | SYSTOLIC BLOOD PRESSURE: 120 MMHG

## 2021-11-17 DIAGNOSIS — Z3A.31 31 WEEKS GESTATION OF PREGNANCY: ICD-10-CM

## 2021-11-17 DIAGNOSIS — Z34.80 SUPERVISION OF OTHER NORMAL PREGNANCY: ICD-10-CM

## 2021-11-17 DIAGNOSIS — O09.92 SUPERVISION OF HIGH RISK PREGNANCY IN SECOND TRIMESTER: Primary | ICD-10-CM

## 2021-11-17 DIAGNOSIS — Z86.16 HISTORY OF COVID-19: ICD-10-CM

## 2021-11-17 PROCEDURE — 90715 TDAP VACCINE 7 YRS/> IM: CPT | Performed by: OBSTETRICS & GYNECOLOGY

## 2021-11-17 PROCEDURE — 0502F SUBSEQUENT PRENATAL CARE: CPT | Performed by: OBSTETRICS & GYNECOLOGY

## 2021-11-17 PROCEDURE — 90471 IMMUNIZATION ADMIN: CPT | Performed by: OBSTETRICS & GYNECOLOGY

## 2021-11-17 PROCEDURE — 90686 IIV4 VACC NO PRSV 0.5 ML IM: CPT | Performed by: OBSTETRICS & GYNECOLOGY

## 2021-11-17 PROCEDURE — 90472 IMMUNIZATION ADMIN EACH ADD: CPT | Performed by: OBSTETRICS & GYNECOLOGY

## 2021-11-17 NOTE — PROGRESS NOTES
Problem List  Date Reviewed: 9/30/2021          Codes Class Noted    COVID-19 affecting pregnancy in third trimester ICD-10-CM: O98.513, U07.1  ICD-9-CM: 647.63, 079.89  10/22/2021        Supervision of other normal pregnancy ICD-10-CM: Z34.80  ICD-9-CM: V22.1  6/8/2021    Overview Addendum 8/20/2021  9:35 AM by Mitzy Jackson     Intrauterine pregnancy with the following problems identified:   EDC 1/19/2022 by D=US (ACOG)  NIPTS- normal  Horizon- neg  ?covid shot  AFP- Positive MFM 8/18  MFM - clot above cervix on ultrasound- [facility]             ACL (anterior cruciate ligament) rupture ICD-10-CM: Q93.039N  ICD-9-CM: 844.2  12/2/2019        Environmental allergies ICD-10-CM: Z91.09  ICD-9-CM: V15.09  3/23/2012

## 2021-11-18 ENCOUNTER — HOSPITAL ENCOUNTER (OUTPATIENT)
Dept: PERINATAL CARE | Age: 31
Discharge: HOME | End: 2021-11-18
Attending: OBSTETRICS & GYNECOLOGY
Payer: MEDICAID

## 2021-11-18 LAB
ERYTHROCYTE [DISTWIDTH] IN BLOOD BY AUTOMATED COUNT: 13.2 % (ref 11.7–15.4)
GLUCOSE 1H P 50 G GLC PO SERPL-MCNC: 88 MG/DL (ref 65–139)
HCT VFR BLD AUTO: 26.2 % (ref 34–46.6)
HGB BLD-MCNC: 9.2 G/DL (ref 11.1–15.9)
MCH RBC QN AUTO: 32.9 PG (ref 26.6–33)
MCHC RBC AUTO-ENTMCNC: 35.1 G/DL (ref 31.5–35.7)
MCV RBC AUTO: 94 FL (ref 79–97)
PLATELET # BLD AUTO: 195 X10E3/UL (ref 150–450)
RBC # BLD AUTO: 2.8 X10E6/UL (ref 3.77–5.28)
WBC # BLD AUTO: 11.6 X10E3/UL (ref 3.4–10.8)

## 2021-11-18 PROCEDURE — 76816 OB US FOLLOW-UP PER FETUS: CPT | Performed by: OBSTETRICS & GYNECOLOGY

## 2021-11-18 RX ORDER — LANOLIN ALCOHOL/MO/W.PET/CERES
325 CREAM (GRAM) TOPICAL
Qty: 90 TABLET | Refills: 3 | Status: SHIPPED | OUTPATIENT
Start: 2021-11-18 | End: 2022-06-07 | Stop reason: ALTCHOICE

## 2021-12-03 ENCOUNTER — ROUTINE PRENATAL (OUTPATIENT)
Dept: OBGYN CLINIC | Age: 31
End: 2021-12-03
Payer: MEDICAID

## 2021-12-03 VITALS — WEIGHT: 218 LBS | SYSTOLIC BLOOD PRESSURE: 113 MMHG | BODY MASS INDEX: 33.64 KG/M2 | DIASTOLIC BLOOD PRESSURE: 71 MMHG

## 2021-12-03 DIAGNOSIS — R77.2 ELEVATED AFP: ICD-10-CM

## 2021-12-03 DIAGNOSIS — Z3A.33 33 WEEKS GESTATION OF PREGNANCY: ICD-10-CM

## 2021-12-03 DIAGNOSIS — Z86.16 HISTORY OF COVID-19: ICD-10-CM

## 2021-12-03 DIAGNOSIS — O09.92 SUPERVISION OF HIGH RISK PREGNANCY IN SECOND TRIMESTER: Primary | ICD-10-CM

## 2021-12-03 PROCEDURE — 0502F SUBSEQUENT PRENATAL CARE: CPT | Performed by: OBSTETRICS & GYNECOLOGY

## 2021-12-03 NOTE — PROGRESS NOTES
Problem List  Date Reviewed: 11/17/2021          Codes Class Noted    FQQHT-85 affecting pregnancy in third trimester ICD-10-CM: O98.513, U07.1  ICD-9-CM: 647.63, 079.89  10/22/2021        Supervision of other normal pregnancy ICD-10-CM: Z34.80  ICD-9-CM: V22.1  6/8/2021    Overview Addendum 11/19/2021  9:36 AM by Keagan Floyd     Intrauterine pregnancy with the following problems identified:   EDC 1/19/2022 by D=US (ACOG)  NIPTS- normal  Horizon- neg  ?covid shot  AFP- Positive [facility] 8/18  MFM - clot above cervix on ultrasound- addressed @[facility] 8/18  covid 10/2021 - hospitalized [facility], monoclonal ab  Anemia 9.2                ACL (anterior cruciate ligament) rupture ICD-10-CM: N43.628N  ICD-9-CM: 844.2  12/2/2019        Environmental allergies ICD-10-CM: Z91.09  ICD-9-CM: V15.09  3/23/2012

## 2021-12-15 ENCOUNTER — ROUTINE PRENATAL (OUTPATIENT)
Dept: OBGYN CLINIC | Age: 31
End: 2021-12-15
Payer: MEDICAID

## 2021-12-15 VITALS
DIASTOLIC BLOOD PRESSURE: 76 MMHG | WEIGHT: 223 LBS | BODY MASS INDEX: 35 KG/M2 | SYSTOLIC BLOOD PRESSURE: 130 MMHG | HEIGHT: 67 IN

## 2021-12-15 DIAGNOSIS — Z86.16 HISTORY OF COVID-19: ICD-10-CM

## 2021-12-15 DIAGNOSIS — Z34.80 SUPERVISION OF OTHER NORMAL PREGNANCY: Primary | ICD-10-CM

## 2021-12-15 DIAGNOSIS — Z3A.35 35 WEEKS GESTATION OF PREGNANCY: ICD-10-CM

## 2021-12-15 PROCEDURE — 0502F SUBSEQUENT PRENATAL CARE: CPT | Performed by: OBSTETRICS & GYNECOLOGY

## 2021-12-15 NOTE — PROGRESS NOTES
Problem List  Date Reviewed: 12/3/2021          Codes Class Noted    COVID-19 affecting pregnancy in third trimester ICD-10-CM: O98.513, U07.1  ICD-9-CM: 647.63, 079.89  10/22/2021        Supervision of other normal pregnancy ICD-10-CM: Z34.80  ICD-9-CM: V22.1  6/8/2021    Overview Addendum 11/19/2021  9:36 AM by Leafy Born     Intrauterine pregnancy with the following problems identified:   EDC 1/19/2022 by D=US (ACOG)  NIPTS- normal  Horizon- neg  ?covid shot  AFP- Positive [facility] 8/18  MFM - clot above cervix on ultrasound- [facility] @[facility] 8/18  covid 10/2021 - hospitalized [facility], monoclonal ab  Anemia 9.2                ACL (anterior cruciate ligament) rupture ICD-10-CM: F34.494Z  ICD-9-CM: 844.2  12/2/2019        Environmental allergies ICD-10-CM: Z91.09  ICD-9-CM: V15.09  3/23/2012

## 2021-12-20 ENCOUNTER — ROUTINE PRENATAL (OUTPATIENT)
Dept: OBGYN CLINIC | Age: 31
End: 2021-12-20
Payer: MEDICAID

## 2021-12-20 VITALS
HEIGHT: 67 IN | DIASTOLIC BLOOD PRESSURE: 80 MMHG | BODY MASS INDEX: 35.31 KG/M2 | SYSTOLIC BLOOD PRESSURE: 130 MMHG | WEIGHT: 225 LBS

## 2021-12-20 DIAGNOSIS — R77.2 ELEVATED AFP: ICD-10-CM

## 2021-12-20 DIAGNOSIS — Z34.80 SUPERVISION OF OTHER NORMAL PREGNANCY: Primary | ICD-10-CM

## 2021-12-20 DIAGNOSIS — Z86.16 HISTORY OF COVID-19: ICD-10-CM

## 2021-12-20 LAB — GRBS, EXTERNAL: NEGATIVE

## 2021-12-20 PROCEDURE — 0502F SUBSEQUENT PRENATAL CARE: CPT | Performed by: OBSTETRICS & GYNECOLOGY

## 2021-12-20 NOTE — PROGRESS NOTES
Problem List  Date Reviewed: 12/15/2021          Codes Class Noted    NAGDL-41 affecting pregnancy in third trimester ICD-10-CM: O98.513, U07.1  ICD-9-CM: 647.63, 079.89  10/22/2021        Supervision of other normal pregnancy ICD-10-CM: Z34.80  ICD-9-CM: V22.1  6/8/2021    Overview Addendum 11/19/2021  9:36 AM by Luiza Cody     Intrauterine pregnancy with the following problems identified:   EDC 1/19/2022 by D=US (ACOG)  NIPTS- normal  Horizon- neg  ?covid shot  AFP- Positive [facility] 8/18  MFM - clot above cervix on ultrasound- addressed @[facility] 8/18  covid 10/2021 - hospitalized [facility], monoclonal ab  Anemia 9.2                ACL (anterior cruciate ligament) rupture ICD-10-CM: P10.037W  ICD-9-CM: 844.2  12/2/2019        Environmental allergies ICD-10-CM: Z91.09  ICD-9-CM: V15.09  3/23/2012

## 2021-12-23 ENCOUNTER — HOSPITAL ENCOUNTER (OUTPATIENT)
Dept: PERINATAL CARE | Age: 31
Discharge: HOME | End: 2021-12-23
Attending: OBSTETRICS & GYNECOLOGY
Payer: MEDICAID

## 2021-12-23 PROCEDURE — 76819 FETAL BIOPHYS PROFIL W/O NST: CPT | Performed by: OBSTETRICS & GYNECOLOGY

## 2021-12-23 PROCEDURE — 76816 OB US FOLLOW-UP PER FETUS: CPT | Performed by: OBSTETRICS & GYNECOLOGY

## 2021-12-24 LAB — B-HEM STREP SPEC QL CULT: NEGATIVE

## 2021-12-27 ENCOUNTER — ROUTINE PRENATAL (OUTPATIENT)
Dept: OBGYN CLINIC | Age: 31
End: 2021-12-27
Payer: MEDICAID

## 2021-12-27 VITALS
BODY MASS INDEX: 35 KG/M2 | SYSTOLIC BLOOD PRESSURE: 132 MMHG | WEIGHT: 223 LBS | DIASTOLIC BLOOD PRESSURE: 70 MMHG | HEIGHT: 67 IN

## 2021-12-27 DIAGNOSIS — Z3A.36 36 WEEKS GESTATION OF PREGNANCY: ICD-10-CM

## 2021-12-27 DIAGNOSIS — O16.3 ELEVATED BLOOD PRESSURE AFFECTING PREGNANCY IN THIRD TRIMESTER, ANTEPARTUM: ICD-10-CM

## 2021-12-27 DIAGNOSIS — Z34.80 SUPERVISION OF OTHER NORMAL PREGNANCY: Primary | ICD-10-CM

## 2021-12-27 PROCEDURE — 0502F SUBSEQUENT PRENATAL CARE: CPT | Performed by: OBSTETRICS & GYNECOLOGY

## 2021-12-27 NOTE — PROGRESS NOTES
Baby moving  Saw [facility] last week - ?issue with fetal heart - having echo in 3 days for baby    Initial /88 - repeat normal  Will check labs, ratio  Repeat BP in 2 days

## 2021-12-27 NOTE — PROGRESS NOTES
Problem List  Date Reviewed: 12/20/2021          Codes Class Noted    COVID-19 affecting pregnancy in third trimester ICD-10-CM: O98.513, U07.1  ICD-9-CM: 647.63, 079.89  10/22/2021        Supervision of other normal pregnancy ICD-10-CM: Z34.80  ICD-9-CM: V22.1  6/8/2021    Overview Addendum 12/27/2021  1:07 PM by Mitzy Jackson     Intrauterine pregnancy with the following problems identified:   EDC 1/19/2022 by D=US (ACOG)  NIPTS- normal  Horizon- neg  ?covid shot  AFP- Positive [facility] 8/18  MFM - clot above cervix on ultrasound- [facility] 8/[facility]  covid 10/2021 - hospitalized [facility], monoclonal ab  Anemia 9.2   GBS- neg               ACL (anterior cruciate ligament) rupture ICD-10-CM: N61.515S  ICD-9-CM: 844.2  12/2/2019        Environmental allergies ICD-10-CM: Z91.09  ICD-9-CM: V15.09  3/23/2012

## 2021-12-28 LAB
ALBUMIN SERPL-MCNC: 3.1 G/DL (ref 3.5–5)
ALBUMIN/GLOB SERPL: 1 {RATIO} (ref 1.1–2.2)
ALP SERPL-CCNC: 217 U/L (ref 45–117)
ALT SERPL-CCNC: 13 U/L (ref 12–78)
ANION GAP SERPL CALC-SCNC: 8 MMOL/L (ref 5–15)
AST SERPL-CCNC: 18 U/L (ref 15–37)
BILIRUB SERPL-MCNC: 0.5 MG/DL (ref 0.2–1)
BUN SERPL-MCNC: 5 MG/DL (ref 6–20)
BUN/CREAT SERPL: 7 (ref 12–20)
CALCIUM SERPL-MCNC: 9.2 MG/DL (ref 8.5–10.1)
CHLORIDE SERPL-SCNC: 108 MMOL/L (ref 97–108)
CO2 SERPL-SCNC: 21 MMOL/L (ref 21–32)
CREAT SERPL-MCNC: 0.68 MG/DL (ref 0.55–1.02)
CREAT UR-MCNC: 75.5 MG/DL
ERYTHROCYTE [DISTWIDTH] IN BLOOD BY AUTOMATED COUNT: 15.1 % (ref 11.5–14.5)
GLOBULIN SER CALC-MCNC: 3.2 G/DL (ref 2–4)
GLUCOSE SERPL-MCNC: 63 MG/DL (ref 65–100)
HCT VFR BLD AUTO: 38.4 % (ref 35–47)
HGB BLD-MCNC: 12.1 G/DL (ref 11.5–16)
MCH RBC QN AUTO: 31.8 PG (ref 26–34)
MCHC RBC AUTO-ENTMCNC: 31.5 G/DL (ref 30–36.5)
MCV RBC AUTO: 100.8 FL (ref 80–99)
NRBC # BLD: 0.03 K/UL (ref 0–0.01)
NRBC BLD-RTO: 0.3 PER 100 WBC
PLATELET # BLD AUTO: 210 K/UL (ref 150–400)
PMV BLD AUTO: 11 FL (ref 8.9–12.9)
POTASSIUM SERPL-SCNC: 4.2 MMOL/L (ref 3.5–5.1)
PROT SERPL-MCNC: 6.3 G/DL (ref 6.4–8.2)
PROT UR-MCNC: 22 MG/DL (ref 0–11.9)
PROT/CREAT UR-RTO: 0.3
RBC # BLD AUTO: 3.81 M/UL (ref 3.8–5.2)
SODIUM SERPL-SCNC: 137 MMOL/L (ref 136–145)
WBC # BLD AUTO: 10.2 K/UL (ref 3.6–11)

## 2021-12-29 ENCOUNTER — ROUTINE PRENATAL (OUTPATIENT)
Dept: OBGYN CLINIC | Age: 31
End: 2021-12-29
Payer: MEDICAID

## 2021-12-29 VITALS — WEIGHT: 223 LBS | DIASTOLIC BLOOD PRESSURE: 88 MMHG | BODY MASS INDEX: 34.93 KG/M2 | SYSTOLIC BLOOD PRESSURE: 131 MMHG

## 2021-12-29 DIAGNOSIS — O09.93 SUPERVISION OF HIGH RISK PREGNANCY IN THIRD TRIMESTER: Primary | ICD-10-CM

## 2021-12-29 DIAGNOSIS — O14.03 MILD PRE-ECLAMPSIA IN THIRD TRIMESTER: ICD-10-CM

## 2021-12-29 DIAGNOSIS — Z3A.37 37 WEEKS GESTATION OF PREGNANCY: ICD-10-CM

## 2021-12-29 DIAGNOSIS — R77.2 ELEVATED AFP: ICD-10-CM

## 2021-12-29 DIAGNOSIS — Z86.16 HISTORY OF COVID-19: ICD-10-CM

## 2021-12-29 PROCEDURE — 0502F SUBSEQUENT PRENATAL CARE: CPT | Performed by: OBSTETRICS & GYNECOLOGY

## 2021-12-29 NOTE — PROGRESS NOTES
Problem List  Date Reviewed: 12/27/2021          Codes Class Noted    COVID-19 affecting pregnancy in third trimester ICD-10-CM: O98.513, U07.1  ICD-9-CM: 647.63, 079.89  10/22/2021        Supervision of other normal pregnancy ICD-10-CM: Z34.80  ICD-9-CM: V22.1  6/8/2021    Overview Addendum 12/27/2021  1:07 PM by Sammy Cluster     Intrauterine pregnancy with the following problems identified:   EDC 1/19/2022 by D=US (ACOG)  NIPTS- normal  Horizon- neg  ?covid shot  AFP- Positive [facility] 8/18  MFM - clot above cervix on ultrasound- addressed @[facility] 8/18  covid 10/2021 - hospitalized [facility], monoclonal ab  Anemia 9.2   GBS- neg               ACL (anterior cruciate ligament) rupture ICD-10-CM: P95.572O  ICD-9-CM: 844.2  12/2/2019        Environmental allergies ICD-10-CM: Z91.09  ICD-9-CM: V15.09  3/23/2012

## 2021-12-29 NOTE — PROGRESS NOTES
Rules in for preeclampsia - disc induction today but has fetal echo tomorrow labs normal  Plan is for IOL on Monday    No S/S PIH today  Baby moving well    Sees MFM in the am  Will get BP cuff and given parameters to call  Given PIH precautions

## 2021-12-30 ENCOUNTER — HOSPITAL ENCOUNTER (OUTPATIENT)
Dept: PERINATAL CARE | Age: 31
Discharge: HOME | End: 2021-12-30
Attending: OBSTETRICS & GYNECOLOGY
Payer: MEDICAID

## 2021-12-30 LAB
ALBUMIN SERPL-MCNC: 2.9 G/DL (ref 3.5–5)
ALBUMIN/GLOB SERPL: 0.9 {RATIO} (ref 1.1–2.2)
ALP SERPL-CCNC: 208 U/L (ref 45–117)
ALT SERPL-CCNC: 13 U/L (ref 12–78)
ANION GAP SERPL CALC-SCNC: 8 MMOL/L (ref 5–15)
AST SERPL-CCNC: 17 U/L (ref 15–37)
BILIRUB SERPL-MCNC: 0.3 MG/DL (ref 0.2–1)
BUN SERPL-MCNC: 6 MG/DL (ref 6–20)
BUN/CREAT SERPL: 8 (ref 12–20)
CALCIUM SERPL-MCNC: 8.7 MG/DL (ref 8.5–10.1)
CHLORIDE SERPL-SCNC: 109 MMOL/L (ref 97–108)
CO2 SERPL-SCNC: 21 MMOL/L (ref 21–32)
CREAT SERPL-MCNC: 0.73 MG/DL (ref 0.55–1.02)
ERYTHROCYTE [DISTWIDTH] IN BLOOD BY AUTOMATED COUNT: 15.8 % (ref 11.5–14.5)
GLOBULIN SER CALC-MCNC: 3.1 G/DL (ref 2–4)
GLUCOSE SERPL-MCNC: 73 MG/DL (ref 65–100)
HCT VFR BLD AUTO: 37.2 % (ref 35–47)
HGB BLD-MCNC: 11.6 G/DL (ref 11.5–16)
MCH RBC QN AUTO: 32 PG (ref 26–34)
MCHC RBC AUTO-ENTMCNC: 31.2 G/DL (ref 30–36.5)
MCV RBC AUTO: 102.8 FL (ref 80–99)
NRBC # BLD: 0 K/UL (ref 0–0.01)
NRBC BLD-RTO: 0 PER 100 WBC
PLATELET # BLD AUTO: 184 K/UL (ref 150–400)
PMV BLD AUTO: 11 FL (ref 8.9–12.9)
POTASSIUM SERPL-SCNC: 4 MMOL/L (ref 3.5–5.1)
PROT SERPL-MCNC: 6 G/DL (ref 6.4–8.2)
RBC # BLD AUTO: 3.62 M/UL (ref 3.8–5.2)
SODIUM SERPL-SCNC: 138 MMOL/L (ref 136–145)
WBC # BLD AUTO: 7.8 K/UL (ref 3.6–11)

## 2021-12-30 PROCEDURE — 76819 FETAL BIOPHYS PROFIL W/O NST: CPT | Performed by: OBSTETRICS & GYNECOLOGY

## 2022-01-03 ENCOUNTER — HOSPITAL ENCOUNTER (INPATIENT)
Age: 32
LOS: 2 days | Discharge: HOME | DRG: 560 | End: 2022-01-05
Attending: OBSTETRICS & GYNECOLOGY | Admitting: OBSTETRICS & GYNECOLOGY
Payer: MEDICAID

## 2022-01-03 DIAGNOSIS — O14.93 PRE-ECLAMPSIA IN THIRD TRIMESTER: ICD-10-CM

## 2022-01-03 DIAGNOSIS — Z34.80 SUPERVISION OF OTHER NORMAL PREGNANCY: ICD-10-CM

## 2022-01-03 PROBLEM — Z34.90 ENCOUNTER FOR ELECTIVE INDUCTION OF LABOR: Status: ACTIVE | Noted: 2022-01-03

## 2022-01-03 PROBLEM — O14.90 PREECLAMPSIA: Status: ACTIVE | Noted: 2022-01-03

## 2022-01-03 LAB
ALBUMIN SERPL-MCNC: 2.5 G/DL (ref 3.5–5)
ALBUMIN/GLOB SERPL: 0.8 {RATIO} (ref 1.1–2.2)
ALP SERPL-CCNC: 231 U/L (ref 45–117)
ALT SERPL-CCNC: 31 U/L (ref 12–78)
ANION GAP SERPL CALC-SCNC: 7 MMOL/L (ref 5–15)
AST SERPL-CCNC: 35 U/L (ref 15–37)
BASOPHILS # BLD: 0 K/UL (ref 0–0.1)
BASOPHILS NFR BLD: 0 % (ref 0–1)
BILIRUB SERPL-MCNC: 0.4 MG/DL (ref 0.2–1)
BUN SERPL-MCNC: 5 MG/DL (ref 6–20)
BUN/CREAT SERPL: 7 (ref 12–20)
CALCIUM SERPL-MCNC: 8.4 MG/DL (ref 8.5–10.1)
CHLORIDE SERPL-SCNC: 112 MMOL/L (ref 97–108)
CO2 SERPL-SCNC: 21 MMOL/L (ref 21–32)
CREAT SERPL-MCNC: 0.75 MG/DL (ref 0.55–1.02)
DIFFERENTIAL METHOD BLD: ABNORMAL
EOSINOPHIL # BLD: 0.2 K/UL (ref 0–0.4)
EOSINOPHIL NFR BLD: 2 % (ref 0–7)
ERYTHROCYTE [DISTWIDTH] IN BLOOD BY AUTOMATED COUNT: 14.6 % (ref 11.5–14.5)
GLOBULIN SER CALC-MCNC: 3.3 G/DL (ref 2–4)
GLUCOSE SERPL-MCNC: 88 MG/DL (ref 65–100)
HCT VFR BLD AUTO: 33.6 % (ref 35–47)
HGB BLD-MCNC: 11.3 G/DL (ref 11.5–16)
IMM GRANULOCYTES # BLD AUTO: 0.3 K/UL (ref 0–0.04)
IMM GRANULOCYTES NFR BLD AUTO: 3 % (ref 0–0.5)
LYMPHOCYTES # BLD: 1.5 K/UL (ref 0.8–3.5)
LYMPHOCYTES NFR BLD: 15 % (ref 12–49)
MCH RBC QN AUTO: 31.9 PG (ref 26–34)
MCHC RBC AUTO-ENTMCNC: 33.6 G/DL (ref 30–36.5)
MCV RBC AUTO: 94.9 FL (ref 80–99)
MONOCYTES # BLD: 0.7 K/UL (ref 0–1)
MONOCYTES NFR BLD: 7 % (ref 5–13)
NEUTS SEG # BLD: 7.2 K/UL (ref 1.8–8)
NEUTS SEG NFR BLD: 73 % (ref 32–75)
NRBC # BLD: 0.04 K/UL (ref 0–0.01)
NRBC BLD-RTO: 0.4 PER 100 WBC
PLATELET # BLD AUTO: 212 K/UL (ref 150–400)
PMV BLD AUTO: 11 FL (ref 8.9–12.9)
POTASSIUM SERPL-SCNC: 4.1 MMOL/L (ref 3.5–5.1)
PROT SERPL-MCNC: 5.8 G/DL (ref 6.4–8.2)
RBC # BLD AUTO: 3.54 M/UL (ref 3.8–5.2)
RBC MORPH BLD: ABNORMAL
SODIUM SERPL-SCNC: 140 MMOL/L (ref 136–145)
WBC # BLD AUTO: 9.9 K/UL (ref 3.6–11)

## 2022-01-03 PROCEDURE — 75410000003 HC RECOV DEL/VAG/CSECN EA 0.5 HR: Performed by: OBSTETRICS & GYNECOLOGY

## 2022-01-03 PROCEDURE — 85025 COMPLETE CBC W/AUTO DIFF WBC: CPT

## 2022-01-03 PROCEDURE — 0HQ9XZZ REPAIR PERINEUM SKIN, EXTERNAL APPROACH: ICD-10-PCS | Performed by: OBSTETRICS & GYNECOLOGY

## 2022-01-03 PROCEDURE — 36415 COLL VENOUS BLD VENIPUNCTURE: CPT

## 2022-01-03 PROCEDURE — 77010026065 HC OXYGEN MINIMUM MEDICAL AIR: Performed by: OBSTETRICS & GYNECOLOGY

## 2022-01-03 PROCEDURE — 74011000250 HC RX REV CODE- 250: Performed by: OBSTETRICS & GYNECOLOGY

## 2022-01-03 PROCEDURE — 74011250636 HC RX REV CODE- 250/636: Performed by: OBSTETRICS & GYNECOLOGY

## 2022-01-03 PROCEDURE — 75410000000 HC DELIVERY VAGINAL/SINGLE: Performed by: OBSTETRICS & GYNECOLOGY

## 2022-01-03 PROCEDURE — 3E033VJ INTRODUCTION OF OTHER HORMONE INTO PERIPHERAL VEIN, PERCUTANEOUS APPROACH: ICD-10-PCS | Performed by: OBSTETRICS & GYNECOLOGY

## 2022-01-03 PROCEDURE — 80053 COMPREHEN METABOLIC PANEL: CPT

## 2022-01-03 PROCEDURE — 75410000002 HC LABOR FEE PER 1 HR: Performed by: OBSTETRICS & GYNECOLOGY

## 2022-01-03 PROCEDURE — 65410000002 HC RM PRIVATE OB

## 2022-01-03 PROCEDURE — 74011250637 HC RX REV CODE- 250/637: Performed by: OBSTETRICS & GYNECOLOGY

## 2022-01-03 PROCEDURE — 65270000029 HC RM PRIVATE

## 2022-01-03 RX ORDER — DIPHENHYDRAMINE HCL 25 MG
25 CAPSULE ORAL
Status: DISCONTINUED | OUTPATIENT
Start: 2022-01-03 | End: 2022-01-05 | Stop reason: HOSPADM

## 2022-01-03 RX ORDER — HYDROCODONE BITARTRATE AND ACETAMINOPHEN 5; 325 MG/1; MG/1
1 TABLET ORAL
Status: DISCONTINUED | OUTPATIENT
Start: 2022-01-03 | End: 2022-01-05 | Stop reason: HOSPADM

## 2022-01-03 RX ORDER — HYDROCORTISONE ACETATE PRAMOXINE HCL 2.5; 1 G/100G; G/100G
CREAM TOPICAL AS NEEDED
Status: DISCONTINUED | OUTPATIENT
Start: 2022-01-03 | End: 2022-01-03 | Stop reason: RX

## 2022-01-03 RX ORDER — OXYTOCIN/RINGER'S LACTATE 30/500 ML
500 PLASTIC BAG, INJECTION (ML) INTRAVENOUS CONTINUOUS
Status: DISCONTINUED | OUTPATIENT
Start: 2022-01-03 | End: 2022-01-05 | Stop reason: HOSPADM

## 2022-01-03 RX ORDER — OXYTOCIN/RINGER'S LACTATE 30/500 ML
87.3 PLASTIC BAG, INJECTION (ML) INTRAVENOUS AS NEEDED
Status: DISCONTINUED | OUTPATIENT
Start: 2022-01-03 | End: 2022-01-03 | Stop reason: SDUPTHER

## 2022-01-03 RX ORDER — SODIUM CHLORIDE, SODIUM LACTATE, POTASSIUM CHLORIDE, CALCIUM CHLORIDE 600; 310; 30; 20 MG/100ML; MG/100ML; MG/100ML; MG/100ML
125 INJECTION, SOLUTION INTRAVENOUS CONTINUOUS
Status: DISCONTINUED | OUTPATIENT
Start: 2022-01-03 | End: 2022-01-05 | Stop reason: HOSPADM

## 2022-01-03 RX ORDER — ZOLPIDEM TARTRATE 5 MG/1
5 TABLET ORAL
Status: DISCONTINUED | OUTPATIENT
Start: 2022-01-03 | End: 2022-01-05 | Stop reason: HOSPADM

## 2022-01-03 RX ORDER — IBUPROFEN 800 MG/1
800 TABLET ORAL EVERY 8 HOURS
Status: DISCONTINUED | OUTPATIENT
Start: 2022-01-03 | End: 2022-01-05 | Stop reason: HOSPADM

## 2022-01-03 RX ORDER — LIDOCAINE HYDROCHLORIDE 10 MG/ML
10 INJECTION INFILTRATION; PERINEURAL AS NEEDED
Status: DISCONTINUED | OUTPATIENT
Start: 2022-01-03 | End: 2022-01-05 | Stop reason: HOSPADM

## 2022-01-03 RX ORDER — ZOLPIDEM TARTRATE 5 MG/1
5 TABLET ORAL
Status: DISCONTINUED | OUTPATIENT
Start: 2022-01-03 | End: 2022-01-03 | Stop reason: SDUPTHER

## 2022-01-03 RX ORDER — OXYTOCIN/RINGER'S LACTATE 30/500 ML
10 PLASTIC BAG, INJECTION (ML) INTRAVENOUS AS NEEDED
Status: DISCONTINUED | OUTPATIENT
Start: 2022-01-03 | End: 2022-01-04 | Stop reason: SDUPTHER

## 2022-01-03 RX ORDER — NALOXONE HYDROCHLORIDE 0.4 MG/ML
0.4 INJECTION, SOLUTION INTRAMUSCULAR; INTRAVENOUS; SUBCUTANEOUS AS NEEDED
Status: DISCONTINUED | OUTPATIENT
Start: 2022-01-03 | End: 2022-01-04 | Stop reason: SDUPTHER

## 2022-01-03 RX ORDER — OXYTOCIN/RINGER'S LACTATE 30/500 ML
87.3 PLASTIC BAG, INJECTION (ML) INTRAVENOUS AS NEEDED
Status: DISCONTINUED | OUTPATIENT
Start: 2022-01-03 | End: 2022-01-04 | Stop reason: SDUPTHER

## 2022-01-03 RX ORDER — ONDANSETRON 4 MG/1
4 TABLET, ORALLY DISINTEGRATING ORAL
Status: DISCONTINUED | OUTPATIENT
Start: 2022-01-03 | End: 2022-01-03 | Stop reason: SDUPTHER

## 2022-01-03 RX ORDER — SIMETHICONE 80 MG
80 TABLET,CHEWABLE ORAL
Status: DISCONTINUED | OUTPATIENT
Start: 2022-01-03 | End: 2022-01-05 | Stop reason: HOSPADM

## 2022-01-03 RX ORDER — ONDANSETRON 4 MG/1
4 TABLET, ORALLY DISINTEGRATING ORAL
Status: ACTIVE | OUTPATIENT
Start: 2022-01-03 | End: 2022-01-04

## 2022-01-03 RX ORDER — ONDANSETRON 2 MG/ML
4 INJECTION INTRAMUSCULAR; INTRAVENOUS
Status: DISCONTINUED | OUTPATIENT
Start: 2022-01-03 | End: 2022-01-05 | Stop reason: HOSPADM

## 2022-01-03 RX ORDER — IBUPROFEN 800 MG/1
800 TABLET ORAL EVERY 8 HOURS
Status: DISCONTINUED | OUTPATIENT
Start: 2022-01-03 | End: 2022-01-03 | Stop reason: SDUPTHER

## 2022-01-03 RX ORDER — SODIUM CHLORIDE 0.9 % (FLUSH) 0.9 %
5-40 SYRINGE (ML) INJECTION AS NEEDED
Status: DISCONTINUED | OUTPATIENT
Start: 2022-01-03 | End: 2022-01-05 | Stop reason: HOSPADM

## 2022-01-03 RX ORDER — MAG HYDROX/ALUMINUM HYD/SIMETH 200-200-20
30 SUSPENSION, ORAL (FINAL DOSE FORM) ORAL
Status: DISCONTINUED | OUTPATIENT
Start: 2022-01-03 | End: 2022-01-05 | Stop reason: HOSPADM

## 2022-01-03 RX ORDER — SODIUM CHLORIDE 0.9 % (FLUSH) 0.9 %
5-40 SYRINGE (ML) INJECTION EVERY 8 HOURS
Status: DISCONTINUED | OUTPATIENT
Start: 2022-01-03 | End: 2022-01-04

## 2022-01-03 RX ORDER — OXYTOCIN/RINGER'S LACTATE 30/500 ML
0-20 PLASTIC BAG, INJECTION (ML) INTRAVENOUS
Status: DISCONTINUED | OUTPATIENT
Start: 2022-01-03 | End: 2022-01-05 | Stop reason: HOSPADM

## 2022-01-03 RX ORDER — OXYTOCIN/RINGER'S LACTATE 30/500 ML
0-20 PLASTIC BAG, INJECTION (ML) INTRAVENOUS
Status: DISCONTINUED | OUTPATIENT
Start: 2022-01-03 | End: 2022-01-03

## 2022-01-03 RX ORDER — DOCUSATE SODIUM 100 MG/1
100 CAPSULE, LIQUID FILLED ORAL 2 TIMES DAILY
Status: DISCONTINUED | OUTPATIENT
Start: 2022-01-03 | End: 2022-01-05 | Stop reason: HOSPADM

## 2022-01-03 RX ORDER — NALOXONE HYDROCHLORIDE 0.4 MG/ML
0.4 INJECTION, SOLUTION INTRAMUSCULAR; INTRAVENOUS; SUBCUTANEOUS AS NEEDED
Status: DISCONTINUED | OUTPATIENT
Start: 2022-01-03 | End: 2022-01-05 | Stop reason: HOSPADM

## 2022-01-03 RX ORDER — HYDROCODONE BITARTRATE AND ACETAMINOPHEN 5; 325 MG/1; MG/1
2 TABLET ORAL
Status: DISCONTINUED | OUTPATIENT
Start: 2022-01-03 | End: 2022-01-05 | Stop reason: HOSPADM

## 2022-01-03 RX ORDER — DIPHENHYDRAMINE HCL 25 MG
25 CAPSULE ORAL
Status: DISCONTINUED | OUTPATIENT
Start: 2022-01-03 | End: 2022-01-03 | Stop reason: SDUPTHER

## 2022-01-03 RX ORDER — NALOXONE HYDROCHLORIDE 0.4 MG/ML
0.4 INJECTION, SOLUTION INTRAMUSCULAR; INTRAVENOUS; SUBCUTANEOUS AS NEEDED
Status: DISCONTINUED | OUTPATIENT
Start: 2022-01-03 | End: 2022-01-03 | Stop reason: SDUPTHER

## 2022-01-03 RX ADMIN — LIDOCAINE HYDROCHLORIDE 10 ML: 10 INJECTION, SOLUTION INFILTRATION; PERINEURAL at 15:22

## 2022-01-03 RX ADMIN — IBUPROFEN 800 MG: 800 TABLET, FILM COATED ORAL at 16:06

## 2022-01-03 RX ADMIN — SODIUM CHLORIDE, POTASSIUM CHLORIDE, SODIUM LACTATE AND CALCIUM CHLORIDE 125 ML/HR: 600; 310; 30; 20 INJECTION, SOLUTION INTRAVENOUS at 07:53

## 2022-01-03 RX ADMIN — OXYTOCIN 250 ML/HR: 10 INJECTION, SOLUTION INTRAMUSCULAR; INTRAVENOUS at 15:55

## 2022-01-03 RX ADMIN — OXYTOCIN 2 MILLI-UNITS/MIN: 10 INJECTION, SOLUTION INTRAMUSCULAR; INTRAVENOUS at 07:55

## 2022-01-03 RX ADMIN — SODIUM CHLORIDE, POTASSIUM CHLORIDE, SODIUM LACTATE AND CALCIUM CHLORIDE 999 ML/HR: 600; 310; 30; 20 INJECTION, SOLUTION INTRAVENOUS at 14:33

## 2022-01-03 NOTE — PROGRESS NOTES
1/3/2022  10:51 AM    CM met with RAMESH to complete initial assessment and begin discharge planning. MOB verified and confirmed demographics. RAMESH lives with Lorin Haas ( [phone]), along with their 11,6, and 3yr old,  at the address on file. RAMESH is employed and plans to take 12wks off from work. FOB is also employed and will be taking adequate time off. RAMESH reports she has good family support, and feels like she has the support she needs when she returns home. RAMESH plans to breast feed baby and has pump to use at home. Dr. Georgina Tomas will provide follow up care for infant. RAMESH has car seat, bassinet/crib, clothing, bottles and all necessary supplies for baby. RAMESH has [employer], and will be adding baby to this policy. CM discussed process to add baby to insurance, MOB verbalized understanding. RAMESH is also enrolled in [facility] and understands how to add baby to program.  Care Management Interventions  PCP Verified by CM: Yes Trisha Issa)  Mode of Transport at Discharge:  Other (see comment)  Transition of Care Consult (CM Consult): Discharge Planning  Support Systems: Other Family Member(s),Spouse/Significant Other  Confirm Follow Up Transport: Family  Discharge Location  Discharge Placement: Home with family assistance  Nhan Zamora

## 2022-01-03 NOTE — L&D DELIVERY NOTE
Delivery Summary    Patient: Lora Yu MRN: [id number]  SSN: [id number]    YOB: 1990  Age: 32 y.o. Sex: female       Information for the patient's : Abbe Arauz, Gilmer Miranda [[id number]]       Labor Events:    Labor: No    Steroids: None   Cervical Ripening Date/Time:       Cervical Ripening Type: None   Antibiotics During Labor: No   Rupture Identifier:      Rupture Date/Time: 1/3/2022 8:25 AM   Rupture Type: AROM   Amniotic Fluid Volume:  Moderate    Amniotic Fluid Description: Clear    Amniotic Fluid Odor:      Induction: Oxytocin       Induction Date/Time: 1/3/2022 7:55 AM    Indications for Induction: Mild Preeclampsia    Augmentation: None   Augmentation Date/Time:      Indications for Augmentation:     Labor complications: None       Additional complications:        Delivery Events:  Indications For Episiotomy:     Episiotomy: None   Perineal Laceration(s): 1st   Repaired:     Periurethral Laceration Location:      Repaired:     Labial Laceration Location:     Repaired:     Sulcal Laceration Location:     Repaired:     Vaginal Laceration Location:     Repaired:     Cervical Laceration Location:     Repaired:     Repair Suture: Chromic 2-0   Number of Repair Packets: 1   Estimated Blood Loss (ml): 150ml   Quantitative Blood Loss (ml)                Delivery Date: 1/3/2022    Delivery Time: 3:13 PM  Delivery Type: Vaginal, Spontaneous  Sex:  Female    Gestational Age: 37w6d   Delivery Clinician:  Carlitos Bedolla  Living Status: Living   Delivery Location: L&D            APGARS  One minute Five minutes Ten minutes   Skin color: 1   1        Heart rate: 2   2        Grimace: 2   2        Muscle tone: 2   2        Breathin   2        Totals: 9   9            Presentation: Vertex    Position: Left Occiput Anterior  Resuscitation Method:  Tactile Stimulation;Suctioning-bulb     Meconium Stained: None      Cord Information: 3 Vessels  Complications: None  Cord around: Delayed cord clamping? Yes  Cord clamped date/time:1/3/2022  3:14 PM  Disposition of Cord Blood: Lab    Blood Gases Sent?: No    Placenta:  Date/Time: 1/3/2022  3:19 PM  Removal: Expressed      Appearance: Normal      Measurements:  Birth Weight:        Birth Length:        Head Circumference:        Chest Circumference:       Abdominal Girth: Other Providers:   MELE ROSAS, YAEL Bailey;SITA KELSYE;;;;;;;;Kolton MILESmb, Obstetrician;Primary Nurse;Primary  Nurse;Nicu Nurse;Neonatologist;Anesthesiologist;Crna;Nurse Practitioner;Midwife;Nursery Nurse;Charge Nurse     The patient delivered via vaginal delivery a viable female infant with APGARS 9/9 in SUKHWINDER position. Placenta was removed intact. A small first degree laceration was repaired with 2-0 chromic. Local anesthesia used. EBL was about 150cc. Pt tolerated delivery in stable condition. Group B Strep:   Lab Results   Component Value Date/Time    GrBStrep, External Negative 2021 12:00 AM     Information for the patient's : Maribel García, Female Piper Edouard [[id number]]   No results found for: ABORH, PCTABR, PCTDIG, BILI, ABORHEXT, ABORH     No results for input(s): PCO2CB, PO2CB, HCO3I, SO2I, IBD, PTEMPI, SPECTI, PHICB, ISITE, IDEV, IALLEN in the last 72 hours.

## 2022-01-03 NOTE — PROGRESS NOTES
0710: Bedside and Verbal shift change report given to PRASANTH Cazares RN (oncoming nurse) by Daniel Herrera RN (offgoing nurse). Report included the following information SBAR, Kardex, Procedure Summary, Intake/Output, MAR, Recent Results and Quality Measures. 3191: EFM readjusted     0725: SAMPSON Blood Given RN notifying this RN that Dr. Eliud Meza ordering to start pitocin TORB. Pt agreeable. 9931: Pitocin started at 2ml/hr per MD order. 0825: Dr. Eliud Meza at bedside. SVE per MD- pt 3/50/-3, AROM at this time- clear fluid noted with scant blood tinge d/t SVE. Lawernce Roughen Pads changed. MD made aware of elevated BP.     0835: CMP drawn and sent to lab at this time. 0935: Pt up at edge of bed. Bed raised for pt to be able to lean on bed. Birthing ball at bedside. 0714-8820: RN readjusting EFM monitor d/t maternal piostion and EFM positin. 1017: Pt sitting on birthing ball at this time. 1018: Dr. Eliud Meza calling for updates on pt. Per MD TORB- increase pitocin every 30min. 1021: Pitocin increased from 8 to 10ml/hr. 1133: IUPC placed by Dr. Eliud Meza. SVE per MD- pt 5/70. Pt turned back to L side. 1208: Dr. Eliud Meza sent perfect serve regarding IUPC and possible need     1230: IUPC replaced by Dr. Eliud Meza- Sonia Rings per MD- pt 6/70. Pt turned to R side with peanut ball in between legs. 1418: Dr. Christopher Stoddard called by this RN d/t pt contrax palpating strong, but not reading as strong on IUPC. Per MD- remove IUPC to allow pt to move more freely. MD asking this for SVE to see if pt 6 or 7cm. 1427: IUPC removed per Dr. Frances Joe. SVE with pt consent per MD order- pt 7-8/90/0 to -1.     9960: IVF bolus started for epidural. Pt using BR at this time. 1443: Dr. Christopher Stoddard introducing self to pt. MD made aware of SVE, pt plan.

## 2022-01-03 NOTE — H&P
History & Physical    Name: Jason Upton MRN: [id number]  SSN: [id number]    YOB: 1990  Age: 32 y.o. Sex: female        Subjective:     Estimated Date of Delivery: 22  OB History        5    Para   3    Term   3            AB   1    Living   3       SAB   1    IAB        Ectopic        Molar        Multiple   0    Live Births   3                Ms. Jaime Friend is admitted with pregnancy at 37w5d for induction of labor. Prenatal course was complicated by preeclampsia, covid this pregnancy, unexplained elevated MSAFP. Please see prenatal records for details. Past Medical History:   Diagnosis Date    Anemia     Chlamydia 2014    ESCOBAR II (cervical intraepithelial neoplasia II)     Dysplasia of cervix, low grade (ESCOBAR 1)     Encounter for insertion of mirena IUD 2018    Mirena placed    Encounter for IUD removal 2020    Essential hypertension     Gestational hypertension     Headache      Past Surgical History:   Procedure Laterality Date    HX ACL RECONSTRUCTION Left     HX ACL RECONSTRUCTION      HX ADENOIDECTOMY      HX COLPOSCOPY      4/28/10- ecto ESCOBAR 2----12 ESCOBAR 1---13-negative    HX GYN      IUD    HX LAP CHOLECYSTECTOMY  10/04/2016    Laparoscopic cholecystectomy by Dr. Azeem Szymanski.  HX OTHER SURGICAL      HX TONSILLECTOMY       Social History     Occupational History    Not on file   Tobacco Use    Smoking status: Former Smoker     Packs/day: 0.25     Years: 0.50     Pack years: 0.12     Quit date:      Years since quittin.0    Smokeless tobacco: Never Used   Vaping Use    Vaping Use: Never used   Substance and Sexual Activity    Alcohol use: Not Currently     Comment: social    Drug use: Not Currently     Comment:     Sexual activity: Yes     Partners: Male     Birth control/protection: None, I.U.D.      Family History   Adopted: Yes   Problem Relation Age of Onset    No Known Problems Mother     No Known Problems Father     Diabetes Maternal Uncle     Hypertension Maternal Grandmother     Gall Bladder Disease Maternal Grandmother     Anesth Problems Neg Hx        Allergies   Allergen Reactions    Depo-Provera [Medroxyprogesterone] Hives    Reclipsen (28) [Desogestrel-Ethinyl Estradiol] Hives    Sulfa (Sulfonamide Antibiotics) Hives     As child    Penicillins Hives and Rash    Sulfamethoxazole-Trimethoprim Rash     Prior to Admission medications    Medication Sig Start Date End Date Taking? Authorizing Provider   ferrous sulfate 325 mg (65 mg iron) tablet Take 1 Tablet by mouth Daily (before breakfast). 11/18/21  Yes Adina Vale MD   aspirin delayed-release 81 mg tablet Take 1 Tablet by mouth daily for 90 days. 10/26/21 1/24/22 Yes Luiza Goldsmith MD   ascorbic acid, vitamin C, (VITAMIN C) 500 mg tablet Take 1 Tablet by mouth daily. 10/17/21  Yes Dimitry Saini MD   prenatal vit-iron fumarate-fa 27 mg iron- 0.8 mg tab tablet Take 1 Tablet by mouth daily. 6/3/21  Yes Adina Vale MD   ondansetron (ZOFRAN ODT) 8 mg disintegrating tablet Take 1 Tablet by mouth every eight (8) hours as needed for Nausea or Vomiting. Patient not taking: Reported on 1/3/2022 7/1/21   Adina Vale MD        Review of Systems: A comprehensive review of systems was negative except for that written in the HPI. Objective:     Vitals:  Vitals:    01/03/22 0613 01/03/22 0618 01/03/22 0814 01/03/22 0823   BP: (!) 149/83   (!) 156/86   Pulse: (!) 102   92   Resp: 16      Temp: 98.3 °F (36.8 °C)      SpO2: 99%  100%    Weight:  223 lb (101.2 kg)     Height:  5' 7\" (1.702 m)          Physical Exam:  Patient without distress. Heart: Regular rate and rhythm  Lung: clear to auscultation throughout lung fields, no wheezes, no rales, no rhonchi and normal respiratory effort  Abdomen: soft, nontender  Fundus: soft and non tender  Perineum: blood absent, amniotic fluid absent  Membranes:  Artificial Rupture of Membranes;  Amniotic Fluid: medium amount of clear fluid  Fetal Heart Rate: Reactive    Prenatal Labs:   Lab Results   Component Value Date/Time    Rubella, External Immune 06/06/2021 12:00 AM    GrBStrep, External Negative 12/20/2021 12:00 AM    HBsAg, External NEGATIVE 06/03/2021 12:00 AM    HIV, External NON REACTIVE  06/03/2021 12:00 AM    RPR, External NEGATIVE 06/03/2021 12:00 AM    Gonorrhea, External NEGATIVE 06/03/2021 12:00 AM    Chlamydia, External NEGATIVE 06/03/2021 12:00 AM        Assessment/Plan:     Plan: Admit for Reassuring fetal status, Continue plan for vaginal delivery. Group B Strep was negative. BP mildly elevated.  Labs normal    Signed By:  Nilo Quintana MD     January 3, 2022

## 2022-01-03 NOTE — PROGRESS NOTES
8145: Patient ambulatory onto unit with FOB.    0615: This RN at bedside to provide assessment, obtain vitals, and consents. Patient verbalizing positive fetal movement. Denies contractions, LOF, or vaginal bleeding. 4079: Bedside and Verbal shift change report given to PRASANTH Elizalde RN (oncoming nurse) by Gerard Gallegos RN (offgoing nurse). Report included the following information SBAR, Intake/Output, MAR, Recent Results and Quality Measures.

## 2022-01-04 PROCEDURE — 65410000002 HC RM PRIVATE OB

## 2022-01-04 PROCEDURE — 74011250637 HC RX REV CODE- 250/637: Performed by: OBSTETRICS & GYNECOLOGY

## 2022-01-04 PROCEDURE — 59400 OBSTETRICAL CARE: CPT | Performed by: OBSTETRICS & GYNECOLOGY

## 2022-01-04 RX ORDER — FAMOTIDINE 20 MG/1
20 TABLET, FILM COATED ORAL DAILY
Status: DISCONTINUED | OUTPATIENT
Start: 2022-01-04 | End: 2022-01-05 | Stop reason: HOSPADM

## 2022-01-04 RX ADMIN — ACETAMINOPHEN 650 MG: 650 SOLUTION ORAL at 13:09

## 2022-01-04 RX ADMIN — ACETAMINOPHEN 650 MG: 650 SOLUTION ORAL at 03:06

## 2022-01-04 RX ADMIN — IBUPROFEN 800 MG: 800 TABLET, FILM COATED ORAL at 00:02

## 2022-01-04 RX ADMIN — FAMOTIDINE 20 MG: 20 TABLET, FILM COATED ORAL at 15:13

## 2022-01-04 RX ADMIN — IBUPROFEN 800 MG: 800 TABLET, FILM COATED ORAL at 15:13

## 2022-01-04 RX ADMIN — IBUPROFEN 800 MG: 800 TABLET, FILM COATED ORAL at 07:42

## 2022-01-04 RX ADMIN — IBUPROFEN 800 MG: 800 TABLET, FILM COATED ORAL at 23:26

## 2022-01-04 NOTE — ROUTINE PROCESS
Bedside shift change report given to Kim Hand RN (oncoming nurse) by Pedro Everett RN (offgoing nurse). Report included the following information SBAR and Kardex.

## 2022-01-04 NOTE — LACTATION NOTE
This note was copied from a baby's chart. Reviewed breastfeeding basics:  How milk is made and normal  breastfeeding behaviors discussed. Supply and demand,  stomach size, early feeding cues, skin to skin bonding with comfortable positioning and baby led latch-on reviewed. How to identify signs of successful breastfeeding sessions reviewed; education on assymetrical latch, signs of effective latching vs shallow, in-effective latching, normal  feeding frequency and duration and expected infant output discussed. Normal course of breastfeeding discussed including the AAP's recommendation that children receive exclusive breast milk feedings for the first six months of life with breast milk feedings to continue through the first year of life and/or beyond as complimentary table foods are added. Breastfeeding Booklet and Warm line information provided with discussion. Discussed typical  weight loss and the importance of pediatrician appointment within 24-48 hours of discharge, at 2 weeks of life and normalcy of requesting pediatric weight checks as needed in between visits. Pt will successfully establish breastfeeding by feeding in response to early feeding cues   or wake every 3h, will obtain deep latch, and will keep log of feedings/output. Taught to BF at hunger cues and or q 2-3 hrs and to offer 10-20 drops of hand expressed colostrum at any non-feeds. Breast Assessment  Left Breast: Large  Left Nipple: Short,Inverted (invert on pressure)  Right Breast: Large  Right Nipple:  Inverted,Short,Intact (inverts on pressure)  Breast- Feeding Assessment  Attends Breast-Feeding Classes: Yes (1st baby)  Breast-Feeding Experience: Yes (2 months *2)  Breast Trauma/Surgery: No  Type/Quality: Good  Lactation Consultant Visits  Breast-Feedings: Good   Mother/Infant Observation  Mother Observation: Alignment,Recognizes feeding cues,Breast comfortable  Infant Observation: Frenulum checked,Opens mouth,Lips flanged, upper,Lips flanged, lower,Rhythmic suck,Relaxed after feeding,Latches nipple and aereolae  LATCH Documentation  Latch: Repeated attempts, hold nipple in mouth, stimulate to suck  Audible Swallowing: A few with stimulation  Type of Nipple: Flat  Comfort (Breast/Nipple): Soft/non-tender  Hold (Positioning): Full assist, teach one side, mother does other, staff holds  LATCH Score: 6    Mom used a shield with previous babies. Educated parents that the natural, prone, position facilitates baby led breastfeeding behaviors. Discussed innate behaviors that the  is born with and neurophysiologic pressure points that are stimulated by being in a prone position on mother's chest. Explained to mother the three simple principals to remember about this position, body, baby, breast.  Adjust her body to a comfortable  reclining position then adjust baby  so that the baby is resting  on and being supported by mother's chest. Once both mother and baby have gravitated towards  a comfortable  position, mom may adjust her breast to aid baby with latching on. Placed  prone on mother's chest, near breast, to facilitate 's innate breastfeeding behaviors. Placing  prone on mother's chest also puts pressure on neurophysiologic pressure points that stimulate complimentary movements in both the  and mother  to facilitate  led latching. Allowing the baby to lead the breastfeeding process empowers mother to have the needed confidence to be successful at breastfeeding    Baby on and off breast, using shield, with sucking bursts in between. Baby has a slightly stuffy nose.

## 2022-01-04 NOTE — ROUTINE PROCESS
Bedside and verbal shift change report given to oncoming nurse, as assigned, by offgoing nurse, Jose Manuel Thomas RN. Report included SBAR, Kardex, I&Os, Recent Results, Procedures, MAR, and changes in patient status. Oncoming nurse and patient given opportunity for questions.

## 2022-01-04 NOTE — PROGRESS NOTES
Post-Partum Day Number 1 Progress Note    Heather De Luna       Information for the patient's : Darlene Espinosa, Female Vallery Cutter [[id number]]   Vaginal, Spontaneous    Patient doing well without significant complaint. Voiding without difficulty, normal lochia. Pt is Breastfeeding without difficulty. Vitals:  Visit Vitals  BP (!) 159/96   Pulse 70   Temp 97.9 °F (36.6 °C)   Resp 17   Ht 5' 7\" (1.702 m)   Wt 223 lb (101.2 kg)   LMP 2021 (Exact Date)   SpO2 99%   Breastfeeding Unknown   BMI 34.93 kg/m²     Temp (24hrs), Av °F (36.7 °C), Min:97.6 °F (36.4 °C), Max:98.3 °F (36.8 °C)        Exam:   Patient without distress. Abdomen soft, fundus firm, nontender                Perineum with normal lochia noted. Lower extremities are negative for swelling, cords or tenderness.     Labs:     Lab Results   Component Value Date/Time    WBC 9.9 2022 06:45 AM    WBC 7.8 2021 11:47 AM    WBC 10.2 2021 04:21 PM    WBC 11.6 (H) 2021 03:00 AM    WBC 7.9 10/23/2021 12:30 AM    WBC 8.2 10/22/2021 10:19 AM    WBC 10.7 2021 03:56 PM    WBC 6.7 2020 11:28 AM    WBC 6.6 2020 10:18 AM    WBC 6.8 2018 09:07 AM    WBC 10.3 2018 06:50 AM    WBC 10.1 2018 02:17 PM    WBC 12.0 (H) 2017 04:49 PM    WBC 11.5 (H) 2015 11:15 PM    WBC 11.1 (H) 2015 10:25 AM    WBC 10.9 2015 09:45 AM    WBC 13.1 (H) 2014 03:16 PM    WBC 7.5 2014 04:09 PM    WBC 5.2 2011 11:20 AM    WBC 7.2 2011 02:10 AM    WBC 15.9 (H) 10/04/2010 02:50 PM    HGB 11.3 (L) 2022 06:45 AM    HGB 11.6 2021 11:47 AM    HGB 12.1 2021 04:21 PM    HGB 9.2 (L) 2021 03:00 AM    HGB 9.0 (L) 10/23/2021 12:30 AM    HGB 10.3 (L) 10/22/2021 10:19 AM    HGB 12.9 2021 03:56 PM    HGB 14.3 2020 11:28 AM    HGB 13.9 2020 10:18 AM    HGB 14.4 2018 09:07 AM    HGB 11.5 2018 06:50 AM    HGB 10.6 (L) 01/24/2018 02:17 PM    HGB 13.1 09/11/2017 04:49 PM    HGB 11.2 (L) 07/02/2015 11:15 PM    HGB 11.1 04/14/2015 10:25 AM    HGB 11.0 (L) 04/12/2015 09:45 AM    HGB 13.2 11/26/2014 03:16 PM    HGB 13.5 02/04/2014 04:09 PM    HGB 14.4 11/26/2011 11:20 AM    HGB 13.7 08/31/2011 02:10 AM    HGB 12.8 10/04/2010 02:50 PM    HCT 33.6 (L) 01/03/2022 06:45 AM    HCT 37.2 12/29/2021 11:47 AM    HCT 38.4 12/27/2021 04:21 PM    HCT 26.2 (L) 11/17/2021 03:00 AM    HCT 26.6 (L) 10/23/2021 12:30 AM    HCT 30.7 (L) 10/22/2021 10:19 AM    HCT 40.1 06/03/2021 03:56 PM    HCT 43.1 11/04/2020 11:28 AM    HCT 42.0 05/20/2020 10:18 AM    HCT 42.5 11/07/2018 09:07 AM    HCT 34.6 (L) 04/13/2018 06:50 AM    HCT 32.6 (L) 01/24/2018 02:17 PM    HCT 38.8 09/11/2017 04:49 PM    HCT 34.2 (L) 07/02/2015 11:15 PM    HCT 32.9 (L) 04/14/2015 10:25 AM    HCT 34.4 (L) 04/12/2015 09:45 AM    HCT 39.8 11/26/2014 03:16 PM    HCT 40.6 02/04/2014 04:09 PM    HCT 41.5 11/26/2011 11:20 AM    HCT 39.8 08/31/2011 02:10 AM    HCT 36.4 10/04/2010 02:50 PM    PLATELET 524 74/16/6074 06:45 AM    PLATELET 211 75/48/9783 11:47 AM    PLATELET 738 09/06/8750 04:21 PM    PLATELET 667 67/18/5720 03:00 AM    PLATELET 641 05/03/6243 12:30 AM    PLATELET 690 98/14/4445 10:19 AM    PLATELET 566 99/99/6079 03:56 PM    PLATELET 941 07/04/6325 11:28 AM    PLATELET 227 57/59/6782 10:18 AM    PLATELET 015 59/52/6440 09:07 AM    PLATELET 965 96/68/6396 06:50 AM    PLATELET 947 17/60/9171 02:17 PM    PLATELET 084 63/68/0444 04:49 PM    PLATELET 415 14/27/7964 11:15 PM    PLATELET 406 94/50/3404 10:25 AM    PLATELET 576 93/55/0996 09:45 AM    PLATELET 336 41/05/4148 03:16 PM    PLATELET 242 07/45/2252 04:09 PM    PLATELET 757 82/06/8711 11:20 AM    PLATELET 126 87/85/4442 02:10 AM    PLATELET 979 09/60/4972 02:50 PM    Hgb, External 10.6 01/24/2018 12:00 AM    Hgb, External 13.1 09/11/2017 12:00 AM    Hct, External 32.6 01/24/2018 12:00 AM    Hct, External 38.8 09/11/2017 12:00 AM Platelet cnt., External 210 01/24/2018 12:00 AM    Platelet cnt., External 286 09/11/2017 12:00 AM       No results found for this or any previous visit (from the past 24 hour(s)). Assessment: Doing well, post partum day 1    Plan:  1. Continue routine postpartum and perineal care as well as maternal education.

## 2022-01-05 VITALS
SYSTOLIC BLOOD PRESSURE: 147 MMHG | WEIGHT: 223 LBS | HEIGHT: 67 IN | HEART RATE: 78 BPM | BODY MASS INDEX: 35 KG/M2 | DIASTOLIC BLOOD PRESSURE: 82 MMHG | OXYGEN SATURATION: 99 % | TEMPERATURE: 99.1 F | RESPIRATION RATE: 18 BRPM

## 2022-01-05 PROCEDURE — 74011250637 HC RX REV CODE- 250/637: Performed by: OBSTETRICS & GYNECOLOGY

## 2022-01-05 RX ORDER — IBUPROFEN 800 MG/1
800 TABLET ORAL
Qty: 30 TABLET | Refills: 0 | Status: SHIPPED | OUTPATIENT
Start: 2022-01-05

## 2022-01-05 RX ADMIN — FAMOTIDINE 20 MG: 20 TABLET, FILM COATED ORAL at 09:09

## 2022-01-05 RX ADMIN — DOCUSATE SODIUM 100 MG: 100 CAPSULE ORAL at 09:09

## 2022-01-05 RX ADMIN — IBUPROFEN 800 MG: 800 TABLET, FILM COATED ORAL at 07:22

## 2022-01-05 NOTE — DISCHARGE INSTRUCTIONS
POST DELIVERY DISCHARGE INSTRUCTIONS    Name: Bethanie Edwards  YOB: 1990  Primary Diagnosis: Active Problems:    Preeclampsia (1/3/2022)      Encounter for elective induction of labor (1/3/2022)        General:     Diet/Diet Restrictions:  Eight 8-ounce glasses of fluid daily (water, juices); avoid excessive caffeine intake. Meals/snacks as desired which are high in fiber and carbohydrates and low in fat and cholesterol. Medications:   {Medication reconciliation information is now added to the patient's AVS automatically when it is printed. There is no need to use this SmartLink in discharge instructions. Highlight this text and delete it to clear this message}      Physical Activity / Restrictions / Safety:     Avoid heavy lifting, no more that 8 lbs. For 2-3 weeks; No driving while taking narcotic pain medication. Post  patients should not drive until pain free. No intercourse 4-6 weeks, no douching or tampon use. May resume exercise in 6 weeks. Discharge Instructions/Special Treatment/Home Care Needs:     Continue prenatal vitamins. Continue to use squirt bottle with warm water on your episiotomy after each bathroom use until bleeding stops. If steri-strips applied to your incision, remove in 7 days. Take stool softeners daily. Call your doctor for the following:     Fever over 101 degrees by mouth. Vaginal bleeding heavier than a normal menstrual period or lost larger than a golf ball. Red streaks or increased swelling of legs, painful red streaks on your breast.  Painful urination, or increased pain, redness or discharge with your incision. Pain Management:     Pain Management:   Take Acetaminophen (Tylenol) or Ibuprofen (Advil, Motrin), as directed for pain. Use a warm Sitz bath 3 times daily to relieve episiotomy or hemorrhoidal discomfort. Heating pad to  incision as needed.  For hemorrhoidal discomfort, use Tucks and Anusol cream as needed and directed. Follow-Up Care:     Appointment with MD:   Follow-up Appointments   Procedures    FOLLOW UP VISIT Appointment in: One Week     Standing Status:   Standing     Number of Occurrences:   1     Order Specific Question:   Appointment in     Answer:    One Week     Telephone number: [phone]    Signed By: Danial Sorenson MD                                                                                                   Date: 1/5/2022 Time: 9:11 AM

## 2022-01-05 NOTE — LACTATION NOTE
This note was copied from a baby's chart. Lots of info reviewed with family. Printed info given. Reviewed breastfeeding basics:  How milk is made and normal  breastfeeding behaviors discussed. Supply and demand,  stomach size, early feeding cues, skin to skin bonding with comfortable positioning and baby led latch-on reviewed. How to identify signs of successful breastfeeding sessions reviewed; education on assymetrical latch, signs of effective latching vs shallow, in-effective latching, normal  feeding frequency and duration and expected infant output discussed. Normal course of breastfeeding discussed including the AAP's recommendation that children receive exclusive breast milk feedings for the first six months of life with breast milk feedings to continue through the first year of life and/or beyond as complimentary table foods are added. Breastfeeding Booklet and Warm line information provided with discussion. Discussed typical  weight loss and the importance of pediatrician appointment within 24-48 hours of discharge, at 2 weeks of life and normalcy of requesting pediatric weight checks as needed in between visits. Chart shows numerous feedings, void, stool WDL. Importance of monitoring outputs and feedings on first week Breastfeeding log and follow up with pediatrician visit for weight check in 1-2 days reviewed. Encouraged to call warm line number for any questions/problems that arise. Pt will successfully establish breastfeeding by feeding in response to early feeding cues or wake every 3h, will obtain deep latch, and will keep log of feedings/output. Taught to BF at hunger cues and or q 2-3 hrs and to offer 10-20 drops of hand expressed colostrum at any non-feeds.       Breast Assessment  Left Breast: Medium  Left Nipple: Everted,Intact  Right Breast: Medium  Right Nipple: Everted,Intact  Breast- Feeding Assessment  Attends Breast-Feeding Classes: Yes (1st baby)  Breast-Feeding Experience: Yes (2 months *2)  Breast Trauma/Surgery: No  Type/Quality: Good  Lactation Consultant Visits  Breast-Feedings: Good   Mother/Infant Observation  Mother Observation: Breast comfortable  Infant Observation: Latches nipple and aereolae,Lips flanged, lower,Lips flanged, upper,Opens mouth,Relaxed after feeding,Other (comment)  LATCH Documentation  Latch: Grasps breast, tongue down, lips flanged, rhythmic sucking  Audible Swallowing: A few with stimulation  Type of Nipple: Everted (after stimulation)  Comfort (Breast/Nipple): Soft/non-tender  Hold (Positioning): Full assist, teach one side, mother does other, staff holds  [facility] Score: 8

## 2022-01-05 NOTE — PROGRESS NOTES
Post-Partum Day Number 2 Progress Note    Heather Erickson Duplin     Information for the patient's : Orelmera Left, Female Luke Ku [[id number]]   Vaginal, Spontaneous    Patient doing well without significant complaint. Voiding without difficulty, normal lochia. Vitals:  Visit Vitals  BP (!) 147/82   Pulse 78   Temp 99.1 °F (37.3 °C)   Resp 18   Ht 5' 7\" (1.702 m)   Wt 223 lb (101.2 kg)   LMP 2021 (Exact Date)   SpO2 99%   Breastfeeding Unknown   BMI 34.93 kg/m²     Temp (24hrs), Av.4 °F (36.9 °C), Min:97.8 °F (36.6 °C), Max:99.1 °F (37.3 °C)      Exam:         Patient without distress. Abdomen soft, fundus firm, nontender                 ower extremities are negative for swelling, cords or tenderness.     Labs:     Lab Results   Component Value Date/Time    WBC 9.9 2022 06:45 AM    WBC 7.8 2021 11:47 AM    WBC 10.2 2021 04:21 PM    WBC 11.6 (H) 2021 03:00 AM    WBC 7.9 10/23/2021 12:30 AM    WBC 8.2 10/22/2021 10:19 AM    WBC 10.7 2021 03:56 PM    WBC 6.7 2020 11:28 AM    WBC 6.6 2020 10:18 AM    WBC 6.8 2018 09:07 AM    WBC 10.3 2018 06:50 AM    WBC 10.1 2018 02:17 PM    WBC 12.0 (H) 2017 04:49 PM    WBC 11.5 (H) 2015 11:15 PM    WBC 11.1 (H) 2015 10:25 AM    WBC 10.9 2015 09:45 AM    WBC 13.1 (H) 2014 03:16 PM    WBC 7.5 2014 04:09 PM    WBC 5.2 2011 11:20 AM    WBC 7.2 2011 02:10 AM    WBC 15.9 (H) 10/04/2010 02:50 PM    HGB 11.3 (L) 2022 06:45 AM    HGB 11.6 2021 11:47 AM    HGB 12.1 2021 04:21 PM    HGB 9.2 (L) 2021 03:00 AM    HGB 9.0 (L) 10/23/2021 12:30 AM    HGB 10.3 (L) 10/22/2021 10:19 AM    HGB 12.9 2021 03:56 PM    HGB 14.3 2020 11:28 AM    HGB 13.9 2020 10:18 AM    HGB 14.4 2018 09:07 AM    HGB 11.5 2018 06:50 AM    HGB 10.6 (L) 2018 02:17 PM    HGB 13.1 2017 04:49 PM    HGB 11.2 (L) 2015 11:15 PM HGB 11.1 04/14/2015 10:25 AM    HGB 11.0 (L) 04/12/2015 09:45 AM    HGB 13.2 11/26/2014 03:16 PM    HGB 13.5 02/04/2014 04:09 PM    HGB 14.4 11/26/2011 11:20 AM    HGB 13.7 08/31/2011 02:10 AM    HGB 12.8 10/04/2010 02:50 PM    HCT 33.6 (L) 01/03/2022 06:45 AM    HCT 37.2 12/29/2021 11:47 AM    HCT 38.4 12/27/2021 04:21 PM    HCT 26.2 (L) 11/17/2021 03:00 AM    HCT 26.6 (L) 10/23/2021 12:30 AM    HCT 30.7 (L) 10/22/2021 10:19 AM    HCT 40.1 06/03/2021 03:56 PM    HCT 43.1 11/04/2020 11:28 AM    HCT 42.0 05/20/2020 10:18 AM    HCT 42.5 11/07/2018 09:07 AM    HCT 34.6 (L) 04/13/2018 06:50 AM    HCT 32.6 (L) 01/24/2018 02:17 PM    HCT 38.8 09/11/2017 04:49 PM    HCT 34.2 (L) 07/02/2015 11:15 PM    HCT 32.9 (L) 04/14/2015 10:25 AM    HCT 34.4 (L) 04/12/2015 09:45 AM    HCT 39.8 11/26/2014 03:16 PM    HCT 40.6 02/04/2014 04:09 PM    HCT 41.5 11/26/2011 11:20 AM    HCT 39.8 08/31/2011 02:10 AM    HCT 36.4 10/04/2010 02:50 PM    PLATELET 771 38/45/1423 06:45 AM    PLATELET 111 22/62/0748 11:47 AM    PLATELET 312 28/32/8093 04:21 PM    PLATELET 166 48/53/9479 03:00 AM    PLATELET 578 84/03/8470 12:30 AM    PLATELET 587 84/85/7096 10:19 AM    PLATELET 379 16/13/6628 03:56 PM    PLATELET 398 19/13/6328 11:28 AM    PLATELET 409 84/70/4565 10:18 AM    PLATELET 926 94/33/4225 09:07 AM    PLATELET 084 97/66/6369 06:50 AM    PLATELET 266 04/49/0290 02:17 PM    PLATELET 021 14/35/6680 04:49 PM    PLATELET 955 13/40/5864 11:15 PM    PLATELET 401 72/56/0114 10:25 AM    PLATELET 467 61/80/5243 09:45 AM    PLATELET 171 62/15/9039 03:16 PM    PLATELET 213 27/21/4726 04:09 PM    PLATELET 379 40/07/9605 11:20 AM    PLATELET 473 07/19/7208 02:10 AM    PLATELET 598 33/23/2580 02:50 PM    Hgb, External 10.6 01/24/2018 12:00 AM    Hgb, External 13.1 09/11/2017 12:00 AM    Hct, External 32.6 01/24/2018 12:00 AM    Hct, External 38.8 09/11/2017 12:00 AM    Platelet cnt., External 210 01/24/2018 12:00 AM    Platelet cnt., External 286 09/11/2017 12:00 AM       No results found for this or any previous visit (from the past 24 hour(s)). Assessment: Doing well, post partum day 2    Plan:   1. Discharge home today  2. Follow up in office in 1 weeks with Claudia Ring MD  3. Post partum activity advised, diet as tolerated  4.  Discharge Medications: ibuprofen,  and medications prior to admission

## 2022-01-05 NOTE — DISCHARGE SUMMARY
Obstetrical Discharge Summary     Name: Josette Gardner MRN: [id number]  SSN: [id number]    YOB: 1990  Age: 32 y.o. Sex: female      Admit Date: 1/3/2022    Discharge Date: 2022     Admitting Physician: Sharon Stanley MD     Attending Physician:  Susan Curran MD     Admission Diagnoses: Encounter for elective induction of labor [Z34.90]; Preeclampsia [O14.90]    Discharge Diagnoses:   Information for the patient's : Aylin Traylor, Female Cary Gallardo [[id number]]   Delivery of a 6 lb 10.2 oz (3.01 kg) female infant via Vaginal, Spontaneous on 1/3/2022 at 3:13 PM  by Collette Bellini. Apgars were 9  and 9 . Additional Diagnoses:   Hospital Problems  Date Reviewed: 2021          Codes Class Noted POA    Preeclampsia ICD-10-CM: O14.90  ICD-9-CM: 642.40  1/3/2022 Unknown        Encounter for elective induction of labor ICD-10-CM: Z34.90  ICD-9-CM: V22.1  1/3/2022 Unknown             Lab Results   Component Value Date/Time    Rubella, External Immune 2021 12:00 AM    GrBStrep, External Negative 2021 12:00 AM       Hospital Course: Normal hospital course following the delivery. Disposition: Home  Condition: Good    Patient Instructions:   Current Discharge Medication List      START taking these medications    Details   ibuprofen (MOTRIN) 800 mg tablet Take 1 Tablet by mouth every eight (8) hours as needed for Pain. Qty: 30 Tablet, Refills: 0         CONTINUE these medications which have NOT CHANGED    Details   ferrous sulfate 325 mg (65 mg iron) tablet Take 1 Tablet by mouth Daily (before breakfast). Qty: 90 Tablet, Refills: 3      aspirin delayed-release 81 mg tablet Take 1 Tablet by mouth daily for 90 days. Qty: 90 Tablet, Refills: 2      ascorbic acid, vitamin C, (VITAMIN C) 500 mg tablet Take 1 Tablet by mouth daily. Qty: 30 Tablet, Refills: 2    Associated Diagnoses: Acute bronchitis due to infection;  Suspected COVID-19 virus infection      prenatal vit-iron fumarate-fa 27 mg iron- 0.8 mg tab tablet Take 1 Tablet by mouth daily. Qty: 90 Tablet, Refills: 4      ondansetron (ZOFRAN ODT) 8 mg disintegrating tablet Take 1 Tablet by mouth every eight (8) hours as needed for Nausea or Vomiting. Qty: 30 Tablet, Refills: 4             Reference my discharge instructions. Follow-up Appointments   Procedures    FOLLOW UP VISIT Appointment in: One Week     Standing Status:   Standing     Number of Occurrences:   1     Order Specific Question:   Appointment in     Answer:    One Week        Signed By:  Florene Gilford, MD     January 5, 2022

## 2022-01-05 NOTE — PROGRESS NOTES
Pt discharged home. Discharge instructions and education completed and patient reported she had no more questions. Bands verified on patients and infant, see footprint sheet. Infant placed in car seat by parent.      Prescriptions e-sent by MD

## 2022-01-10 ENCOUNTER — OFFICE VISIT (OUTPATIENT)
Dept: OBGYN CLINIC | Age: 32
End: 2022-01-10
Payer: MEDICAID

## 2022-01-10 VITALS
BODY MASS INDEX: 32.96 KG/M2 | WEIGHT: 210 LBS | DIASTOLIC BLOOD PRESSURE: 102 MMHG | HEIGHT: 67 IN | SYSTOLIC BLOOD PRESSURE: 145 MMHG

## 2022-01-10 PROCEDURE — 99213 OFFICE O/P EST LOW 20 MIN: CPT | Performed by: OBSTETRICS & GYNECOLOGY

## 2022-01-10 RX ORDER — NIFEDIPINE 30 MG/1
30 TABLET, EXTENDED RELEASE ORAL DAILY
Qty: 90 TABLET | Refills: 1 | Status: SHIPPED | OUTPATIENT
Start: 2022-01-10 | End: 2022-06-07 | Stop reason: ALTCHOICE

## 2022-01-10 NOTE — PROGRESS NOTES
BP check    The patient is a 32 y.o.,  Patient's last menstrual period was 2021 (exact date). .     She is here for a blood pressure check. Blood pressure today is 145/102 No headaches, baby doing well    Past Medical History:   Diagnosis Date    Anemia     Chlamydia 2014    ESCOBAR II (cervical intraepithelial neoplasia II)     Dysplasia of cervix, low grade (ESCOBAR 1)     Encounter for insertion of mirena IUD 2018    Mirena placed    Encounter for IUD removal 2020    Essential hypertension     Gestational hypertension     Headache         Past Surgical History:   Procedure Laterality Date    HX ACL RECONSTRUCTION Left     HX ACL RECONSTRUCTION      HX ADENOIDECTOMY      HX COLPOSCOPY      4/28/10- ecto ESCOBAR 2----12 ESCOBAR 1---13-negative    HX GYN      IUD    HX LAP CHOLECYSTECTOMY  10/04/2016    Laparoscopic cholecystectomy by Dr. Julio Frost.  HX OTHER SURGICAL      HX TONSILLECTOMY       Social History     Occupational History    Not on file   Tobacco Use    Smoking status: Former Smoker     Packs/day: 0.25     Years: 0.50     Pack years: 0.12     Quit date:      Years since quittin.0    Smokeless tobacco: Never Used   Vaping Use    Vaping Use: Never used   Substance and Sexual Activity    Alcohol use: Not Currently     Comment: social    Drug use: Not Currently     Comment:     Sexual activity: Yes     Partners: Male     Birth control/protection: None, I.U.D.      Family History   Adopted: Yes   Problem Relation Age of Onset    No Known Problems Mother     No Known Problems Father     Diabetes Maternal Uncle     Hypertension Maternal Grandmother     Gall Bladder Disease Maternal Grandmother     Anesth Problems Neg Hx        Allergies   Allergen Reactions    Depo-Provera [Medroxyprogesterone] Hives    Reclipsen (29) [Desogestrel-Ethinyl Estradiol] Hives    Sulfa (Sulfonamide Antibiotics) Hives     As child    Penicillins Hives and Rash    Sulfamethoxazole-Trimethoprim Rash     Prior to Admission medications    Medication Sig Start Date End Date Taking? Authorizing Provider   ibuprofen (MOTRIN) 800 mg tablet Take 1 Tablet by mouth every eight (8) hours as needed for Pain. 1/5/22   Sammy Barrera MD   ferrous sulfate 325 mg (65 mg iron) tablet Take 1 Tablet by mouth Daily (before breakfast). 11/18/21   Sammy Barrera MD   aspirin delayed-release 81 mg tablet Take 1 Tablet by mouth daily for 90 days. 10/26/21 1/24/22  Som Urban MD   ascorbic acid, vitamin C, (VITAMIN C) 500 mg tablet Take 1 Tablet by mouth daily. 10/17/21   David Martin MD   ondansetron (ZOFRAN ODT) 8 mg disintegrating tablet Take 1 Tablet by mouth every eight (8) hours as needed for Nausea or Vomiting. Patient not taking: Reported on 1/3/2022 7/1/21   Sammy Barrera MD   prenatal vit-iron fumarate-fa 27 mg iron- 0.8 mg tab tablet Take 1 Tablet by mouth daily. 6/3/21   Sammy Barrera MD      Visit Vitals  BP (!) 145/102   Ht 5' 7\" (1.702 m)   Wt 210 lb (95.3 kg)   LMP 04/14/2021 (Exact Date)   Breastfeeding Yes   BMI 32.89 kg/m²       Assessment:   Preeclampsia delivered    Plan:   Start Procardia XL 30mg   Has cuff at home - advised to report is BP >160/110        Instructions given to pt. Handouts given to pt.

## 2022-01-28 ENCOUNTER — TELEPHONE (OUTPATIENT)
Dept: OBGYN CLINIC | Age: 32
End: 2022-01-28

## 2022-01-28 NOTE — TELEPHONE ENCOUNTER
Call received at 135pm    32year old patient delivered on 1/3/2022     Patient calling about her fmla paper work and was advised that we have not received the paper work     Patient was advised to attach the paper work to her my chart  Attention CS    Patient was advised that significant others paper work needs to be sent to the cardiology office        Patient was placed on the scheduled to be seen at 8:20 am on 2/15/2022 for her pp 6 week pp check    Patient verbalized understanding.

## 2022-02-15 NOTE — PROGRESS NOTES
Postpartum evaluation    Lora Yu is a 32 y.o. female who presents for a postpartum exam.     She is now six weeks post normal spontaneous vaginal delivery 1/13/2022    Her baby is doing well. She has had menses since delivery. She has had the following significant problems since her delivery: none    The patient is bottle feeding without difficulty. The patient states her  is getting a vasectomy     She is currently taking: procardia    She is due for her next AE in 3 months.      Visit Vitals  /83   Wt 205 lb (93 kg)   LMP 02/14/2022   Breastfeeding No   BMI 32.11 kg/m²       PHYSICAL EXAMINATION    Constitutional  · Appearance: well-nourished, well developed, alert, in no acute distress    HENT  · Head and Face: appears normal    Neck  · Inspection/Palpation: normal appearance, no masses or tenderness  · Lymph Nodes: no lymphadenopathy present  · Thyroid: gland size normal, nontender, no nodules or masses present on palpation    Breasts  · Inspection of Breasts: breasts symmetrical, no skin changes, no discharge present, nipple appearance normal, no skin retraction present  · Palpation of Breasts and Axillae: no masses present on palpation, no breast tenderness  · Axillary Lymph Nodes: no lymphadenopathy present    Gastrointestinal  · Abdominal Examination: abdomen non-tender to palpation, normal bowel sounds, no masses present  · Liver and spleen: no hepatomegaly present, spleen not palpable  · Hernias: no hernias identified    Genitourinary  · External Genitalia: normal appearance for age, no discharge present, no tenderness present, no inflammatory lesions present, no masses present, no atrophy present  · Vagina: normal vaginal vault without central or paravaginal defects, no discharge present, no inflammatory lesions present, no masses present  · Bladder: non-tender to palpation  · Urethra: appears normal  · Cervix: normal   · Uterus: normal size, shape and consistency  · Adnexa: no adnexal tenderness present, no adnexal masses present  · Perineum: perineum within normal limits, no evidence of trauma, no rashes or skin lesions present  · Anus: anus within normal limits, no hemorrhoids present  · Inguinal Lymph Nodes: no lymphadenopathy present    Skin  · General Inspection: no rash, no lesions identified    Neurologic/Psychiatric  · Mental Status:  · Orientation: grossly oriented to person, place and time  · Mood and Affect: mood normal, affect appropriate    Assessment:  Normal postpartum check    Plan:  RTO for AE.   Stop procardia  Will monitor BP at home

## 2022-02-16 ENCOUNTER — OFFICE VISIT (OUTPATIENT)
Dept: OBGYN CLINIC | Age: 32
End: 2022-02-16
Payer: MEDICAID

## 2022-02-16 VITALS — BODY MASS INDEX: 32.11 KG/M2 | SYSTOLIC BLOOD PRESSURE: 118 MMHG | WEIGHT: 205 LBS | DIASTOLIC BLOOD PRESSURE: 83 MMHG

## 2022-02-16 PROCEDURE — 0503F POSTPARTUM CARE VISIT: CPT | Performed by: OBSTETRICS & GYNECOLOGY

## 2022-03-18 PROBLEM — S83.519A ACL (ANTERIOR CRUCIATE LIGAMENT) RUPTURE: Status: ACTIVE | Noted: 2019-12-02

## 2022-03-19 PROBLEM — Z34.90 ENCOUNTER FOR ELECTIVE INDUCTION OF LABOR: Status: ACTIVE | Noted: 2022-01-03

## 2022-03-19 PROBLEM — Z34.80 SUPERVISION OF OTHER NORMAL PREGNANCY: Status: ACTIVE | Noted: 2021-06-08

## 2022-03-19 PROBLEM — O14.90 PREECLAMPSIA: Status: ACTIVE | Noted: 2022-01-03

## 2022-03-19 PROBLEM — U07.1 COVID-19 AFFECTING PREGNANCY IN THIRD TRIMESTER: Status: ACTIVE | Noted: 2021-10-22

## 2022-03-19 PROBLEM — O98.513 COVID-19 AFFECTING PREGNANCY IN THIRD TRIMESTER: Status: ACTIVE | Noted: 2021-10-22

## 2022-05-06 NOTE — PATIENT INSTRUCTIONS
Weeks 6 to 10 of Your Pregnancy: Care Instructions  Your Care Instructions    Congratulations on your pregnancy. This is an exciting and important time for you. During the first 6 to 10 weeks of your pregnancy, your body goes through many changes. Your baby grows very fast, even though you cannot feel it yet. You may start to notice that you feel different, both in your body and your emotions. Because each woman's pregnancy is unique, there is no right way to feel. You may feel the healthiest you have ever been, or you may feel tired or sick to your stomach (\"morning sickness\"). These early weeks are a time to make healthy choices and to eat the best foods for you and your baby. This care sheet will give you some ideas. This is also a good time to think about birth defects testing. These are tests done during pregnancy to look for possible problems with the baby. First trimester tests for birth defects can be done between 8 and 17 weeks of pregnancy, depending on the test. Talk with your doctor about what kinds of tests are available. Follow-up care is a key part of your treatment and safety. Be sure to make and go to all appointments, and call your doctor if you are having problems. It's also a good idea to know your test results and keep a list of the medicines you take. How can you care for yourself at home? Eat well  · Eat at least 3 meals and 2 healthy snacks every day. Eat fresh, whole foods, including:  ¨ 7 or more servings of bread, tortillas, cereal, rice, pasta, or oatmeal.  ¨ 3 or more servings of vegetables, especially leafy green vegetables. ¨ 2 or more servings of fruits. ¨ 3 or more servings of milk, yogurt, or cheese. ¨ 2 or more servings of meat, turkey, chicken, fish, eggs, or dried beans. · Drink plenty of fluids, especially water. Avoid sodas and other sweetened drinks. · Choose foods that have important vitamins for your baby, such as calcium, iron, and folate.   ¨ Dairy products, tofu, canned fish with bones, almonds, broccoli, dark leafy greens, corn tortillas, and fortified orange juice are good sources of calcium. ¨ Beef, poultry, liver, spinach, lentils, dried beans, fortified cereals, and dried fruits are rich in iron. ¨ Dark leafy greens, broccoli, asparagus, liver, fortified cereals, orange juice, peanuts, and almonds are good sources of folate. · Avoid foods that could harm your baby. ¨ Do not eat raw or undercooked meat, chicken, or fish (such as sushi or raw oysters). ¨ Do not eat raw eggs or foods that contain raw eggs, such as Caesar dressing. ¨ Do not eat soft cheeses and unpasteurized dairy foods, such as Brie, feta, or blue cheese. ¨ Do not eat fish that contains a lot of mercury, such as shark, swordfish, tilefish, or oma mackerel. Do not eat more than 6 ounces of tuna each week. ¨ Do not eat raw sprouts, especially alfalfa sprouts. ¨ Cut down on caffeine, such as coffee, tea, and cola. Protect yourself and your baby  · Do not touch hollie litter or cat feces. They can cause an infection that could harm your baby. · High body temperature can be harmful to your baby. So if you want to use a sauna or hot tub, be sure to talk to your doctor about how to use it safely. [place] with morning sickness  · Sip small amounts of water, juices, or shakes. Try drinking between meals, not with meals. · Eat 5 or 6 small meals a day. Try dry toast or crackers when you first get up, and eat breakfast a little later. · Avoid spicy, greasy, and fatty foods. · When you feel sick, open your windows or go for a short walk to get fresh air. · Try nausea wristbands. These help some women. · Tell your doctor if you think your prenatal vitamins make you sick. Where can you learn more? Go to http://hector.info/. Enter G112 in the search box to learn more about \"Weeks 6 to 10 of Your Pregnancy: Care Instructions. \"  Current as of: March 16, 2017  Content Version: 11.3  © 5578-5523 [employer]. Care instructions adapted under license by [employer] (which disclaims liability or warranty for this information). If you have questions about a medical condition or this instruction, always ask your healthcare professional. [address] any warranty or liability for your use of this information. [employer] Counseling:  I discussed with the patient the risks of mycophenolate mofetil including but not limited to infection/immunosuppression, GI upset, hypokalemia, hypercholesterolemia, bone marrow suppression, lymphoproliferative disorders, malignancy, GI ulceration/bleed/perforation, colitis, interstitial lung disease, kidney failure, progressive multifocal leukoencephalopathy, and birth defects.  The patient understands that monitoring is required including a baseline creatinine and regular CBC testing. In addition, patient must alert us immediately if symptoms of infection or other concerning signs are noted.

## 2022-06-02 ENCOUNTER — TELEPHONE (OUTPATIENT)
Dept: FAMILY MEDICINE CLINIC | Age: 32
End: 2022-06-02

## 2022-06-02 NOTE — TELEPHONE ENCOUNTER
Called patient state she went to [address] [place] [address] for dehydration suggest to patient drink plenty of liquids and if nausea get worst go to [facility] facility.  Scheduled patient 6/7/2022

## 2022-06-07 ENCOUNTER — OFFICE VISIT (OUTPATIENT)
Dept: FAMILY MEDICINE CLINIC | Age: 32
End: 2022-06-07
Payer: MEDICAID

## 2022-06-07 VITALS
BODY MASS INDEX: 32.49 KG/M2 | DIASTOLIC BLOOD PRESSURE: 77 MMHG | HEIGHT: 67 IN | OXYGEN SATURATION: 98 % | RESPIRATION RATE: 20 BRPM | SYSTOLIC BLOOD PRESSURE: 118 MMHG | HEART RATE: 83 BPM | TEMPERATURE: 97.8 F | WEIGHT: 207 LBS

## 2022-06-07 DIAGNOSIS — R11.0 NAUSEA: ICD-10-CM

## 2022-06-07 DIAGNOSIS — E55.9 VITAMIN D DEFICIENCY: ICD-10-CM

## 2022-06-07 DIAGNOSIS — R53.83 FATIGUE, UNSPECIFIED TYPE: ICD-10-CM

## 2022-06-07 DIAGNOSIS — R35.0 FREQUENCY OF URINATION: ICD-10-CM

## 2022-06-07 DIAGNOSIS — E78.5 DYSLIPIDEMIA: ICD-10-CM

## 2022-06-07 DIAGNOSIS — A09 TRAVELER'S DIARRHEA: Primary | ICD-10-CM

## 2022-06-07 DIAGNOSIS — R73.02 IGT (IMPAIRED GLUCOSE TOLERANCE): ICD-10-CM

## 2022-06-07 DIAGNOSIS — E03.9 HYPOTHYROIDISM, UNSPECIFIED TYPE: ICD-10-CM

## 2022-06-07 DIAGNOSIS — D64.9 ANEMIA, NORMOCYTIC NORMOCHROMIC: ICD-10-CM

## 2022-06-07 PROCEDURE — 99214 OFFICE O/P EST MOD 30 MIN: CPT | Performed by: INTERNAL MEDICINE

## 2022-06-07 RX ORDER — CIPROFLOXACIN 250 MG/1
250 TABLET, FILM COATED ORAL EVERY 12 HOURS
Qty: 10 TABLET | Refills: 0 | Status: SHIPPED | OUTPATIENT
Start: 2022-06-07 | End: 2022-06-12

## 2022-06-07 RX ORDER — AZELASTINE 1 MG/ML
SPRAY, METERED NASAL
COMMUNITY
Start: 2022-04-30 | End: 2022-08-06

## 2022-06-07 RX ORDER — METRONIDAZOLE 250 MG/1
250 TABLET ORAL 2 TIMES DAILY
Qty: 10 TABLET | Refills: 0 | Status: SHIPPED | OUTPATIENT
Start: 2022-06-07 | End: 2022-06-12

## 2022-06-07 RX ORDER — MONTELUKAST SODIUM 10 MG/1
10 TABLET ORAL DAILY
COMMUNITY
Start: 2022-05-07 | End: 2022-08-06

## 2022-06-07 RX ORDER — ONDANSETRON 4 MG/1
4 TABLET, ORALLY DISINTEGRATING ORAL
Qty: 20 TABLET | Refills: 0 | Status: SHIPPED | OUTPATIENT
Start: 2022-06-07 | End: 2022-08-06

## 2022-06-07 NOTE — PATIENT INSTRUCTIONS
Traveler's Diarrhea: Care Instructions  Your Care Instructions     Traveler's diarrhea is loose, watery bowel movements you can get when you travel. It also can cause vomiting and belly cramps. This kind of diarrhea is usually caused by bacteria. But sometimes it is caused by a parasite or virus. Most people get it when they eat undercooked, raw, or contaminated foods. You can also get it if you drink contaminated water or if you drink something that has contaminated ice cubes in it. In some cases, new foods can cause diarrhea. In other cases, the stress and anxiety of travel can cause it. Traveler's diarrhea usually isn't serious. Most of the time, bowel movements return to normal quickly. The most important thing is to prevent dehydration. Make sure to drink a lot of fluids. Follow-up care is a key part of your treatment and safety. Be sure to make and go to all appointments, and call your doctor if you are having problems. It's also a good idea to know your test results and keep a list of the medicines you take. How can you care for yourself at home? · Watch for signs of dehydration. This means your body has lost too much water. Dehydration is serious and needs to be treated right away. Signs of dehydration are:  ? Feeling more thirsty than usual.  ? Dry eyes and mouth. ? Feeling faint or lightheaded. ? A smaller amount of urine than normal.  · To prevent dehydration, drink plenty of fluids. Choose bottled or boiled water and other clear liquids until you feel better. If you have kidney, heart, or liver disease and have to limit fluids, talk with your doctor before you increase the amount of fluids you drink. · Start to eat small amounts of mild foods if you feel like it. · Your doctor may recommend an over-the-counter medicine. These may include bismuth subsalicylate (Pepto-Bismol) or loperamide (Imodium). Read and follow all instructions on the label.  Do not use these medicines if your doctor does not recommend them. · Be safe with medicines. If your doctor recommends prescription medicine, take it as prescribed. Call your doctor if you think you are having a problem with your medicine. You will get more details on the specific medicines your doctor prescribes. · If your doctor prescribes antibiotics, take them as directed. Do not stop taking them just because you feel better. You need to take the full course of antibiotics. When should you call for help? Call 911 anytime you think you may need emergency care. For example, call if:    · You passed out (lost consciousness).     · Your stools are maroon or very bloody. Call your doctor now or seek immediate medical care if:    · You are dizzy or lightheaded, or you feel like you may faint.     · Your stools are black and look like tar, or they have streaks of blood.     · You have diarrhea and your belly pain or cramps are worse.     · You have signs of needing more fluids. You have sunken eyes, a dry mouth, and pass only a little urine. Watch closely for changes in your health, and be sure to contact your doctor if:    · You have 12 or more loose stools in 24 hours.     · You see pus in the diarrhea.     · You have a new or higher fever.     · Your diarrhea does not get better or is more frequent. Where can you learn more? Go to http://www.gray.com/  Enter L368 in the search box to learn more about \"Traveler's Diarrhea: Care Instructions. \"  Current as of: September 20, 2021               Content Version: 13.2  © 0129-1965 [employer]. Care instructions adapted under license by [employer] (which disclaims liability or warranty for this information). If you have questions about a medical condition or this instruction, always ask your healthcare professional. [address] any warranty or liability for your use of this information.

## 2022-06-07 NOTE — PROGRESS NOTES
Chief Complaint   Patient presents with    ED Follow-up     HPI:  Katerina Gamboa is a 32 y.o. AA female presents for overdue follow up. Patient was recently seen in [address] [place] [address] ER for diarrhea, nausea, treated for dehydration and advised to f/u with pcp. She reports she still has watery stool with some mucus, occasional abdominal cramps and nausea. Denies bloody stools, vomiting, fever/chills. She has a recent h/o traveling which makes me consider traveling diarrhea. Review of Systems  As per hpi    Past Medical History:   Diagnosis Date    Anemia     Chlamydia 2014    ESCOBAR II (cervical intraepithelial neoplasia II)     Dysplasia of cervix, low grade (ESCOBAR 1)     Encounter for insertion of mirena IUD 2018    Mirena placed    Encounter for IUD removal 2020    Essential hypertension     Gestational hypertension     Headache      Past Surgical History:   Procedure Laterality Date    HX ACL RECONSTRUCTION Left     HX ACL RECONSTRUCTION  2019    HX ADENOIDECTOMY  1994    HX COLPOSCOPY      4/28/10- ecto ESCOBAR 2----12 ESCOBAR 1---13-negative    HX GYN      IUD    HX LAP CHOLECYSTECTOMY  10/04/2016    Laparoscopic cholecystectomy by Dr. Yong Weldon.  HX OTHER SURGICAL      HX TONSILLECTOMY       Social History     Socioeconomic History    Marital status: SINGLE   Tobacco Use    Smoking status: Former Smoker     Packs/day: 0.25     Years: 0.50     Pack years: 0.12     Quit date:      Years since quittin.4    Smokeless tobacco: Never Used   Vaping Use    Vaping Use: Never used   Substance and Sexual Activity    Alcohol use: Not Currently     Comment: social    Drug use: Not Currently     Comment:     Sexual activity: Yes     Partners: Male     Birth control/protection: None, I.U.D.      Family History   Adopted: Yes   Problem Relation Age of Onset    No Known Problems Mother     No Known Problems Father     Diabetes Maternal Uncle     Hypertension Maternal Grandmother     Gall Bladder Disease Maternal Grandmother     Anesth Problems Neg Hx      Current Outpatient Medications   Medication Sig Dispense Refill    azelastine (ASTELIN) 137 mcg (0.1 %) nasal spray SPRAY 1-2 SPRAY INTO BOTH NOSTRILS TWICE A DAY      montelukast (SINGULAIR) 10 mg tablet Take 10 mg by mouth daily.  ibuprofen (MOTRIN) 800 mg tablet Take 1 Tablet by mouth every eight (8) hours as needed for Pain. 30 Tablet 0    ondansetron (ZOFRAN ODT) 8 mg disintegrating tablet Take 1 Tablet by mouth every eight (8) hours as needed for Nausea or Vomiting. (Patient not taking: Reported on 1/3/2022) 30 Tablet 4     Allergies   Allergen Reactions    Depo-Provera [Medroxyprogesterone] Hives    Reclipsen (28) [Desogestrel-Ethinyl Estradiol] Hives    Sulfa (Sulfonamide Antibiotics) Hives     As child    Penicillins Hives and Rash    Sulfamethoxazole-Trimethoprim Rash       Objective:  Visit Vitals  /77   Pulse 83   Temp 97.8 °F (36.6 °C) (Temporal)   Resp 20   Ht 5' 7\" (1.702 m)   Wt 207 lb (93.9 kg)   LMP 06/07/2022   SpO2 98%   BMI 32.42 kg/m²     Physical Exam:   General appearance - alert, well appearing in no distress  Mental status - alert, oriented to person, place, and time  EYE-PERRL, EOMI  ENT-ENT exam normal, no neck nodes or sinus tenderness  Neck - supple, no significant adenopathy   Chest - clear to auscultation, no wheezes, rales or rhonchi  Heart - normal rate, regular rhythm, no murmurs  Abdomen - soft, nontender, nondistended, no organomegaly  Ext-peripheral pulses normal, no pedal edema  Skin-Warm and dry.  no hyperpigmentation, vitiligo, or suspicious lesions  Neuro -alert, oriented, normal speech, no focal findings or movement disorder noted  Back-full range of motion, no tenderness, palpable spasm or pain on motion     Results for orders placed or performed during the hospital encounter of 01/03/22   CBC WITH AUTOMATED DIFF   Result Value Ref Range    WBC 9.9 3.6 - 11.0 K/uL RBC 3.54 (L) 3.80 - 5.20 M/uL    HGB 11.3 (L) 11.5 - 16.0 g/dL    HCT 33.6 (L) 35.0 - 47.0 %    MCV 94.9 80.0 - 99.0 FL    MCH 31.9 26.0 - 34.0 PG    MCHC 33.6 30.0 - 36.5 g/dL    RDW 14.6 (H) 11.5 - 14.5 %    PLATELET 308 293 - 882 K/uL    MPV 11.0 8.9 - 12.9 FL    NRBC 0.4 (H) 0  WBC    ABSOLUTE NRBC 0.04 (H) 0.00 - 0.01 K/uL    NEUTROPHILS 73 32 - 75 %    LYMPHOCYTES 15 12 - 49 %    MONOCYTES 7 5 - 13 %    EOSINOPHILS 2 0 - 7 %    BASOPHILS 0 0 - 1 %    IMMATURE GRANULOCYTES 3 (H) 0.0 - 0.5 %    ABS. NEUTROPHILS 7.2 1.8 - 8.0 K/UL    ABS. LYMPHOCYTES 1.5 0.8 - 3.5 K/UL    ABS. MONOCYTES 0.7 0.0 - 1.0 K/UL    ABS. EOSINOPHILS 0.2 0.0 - 0.4 K/UL    ABS. BASOPHILS 0.0 0.0 - 0.1 K/UL    ABS. IMM. GRANS. 0.3 (H) 0.00 - 0.04 K/UL    DF SMEAR SCANNED      RBC COMMENTS NORMOCYTIC, NORMOCHROMIC     RUBELLA AB, IGG, EXTERNAL   Result Value Ref Range    Rubella, External Immune    GYN RAPID GP B STREP, EXTERNAL   Result Value Ref Range    GrBStrep, External Negative    METABOLIC PANEL, COMPREHENSIVE   Result Value Ref Range    Sodium 140 136 - 145 mmol/L    Potassium 4.1 3.5 - 5.1 mmol/L    Chloride 112 (H) 97 - 108 mmol/L    CO2 21 21 - 32 mmol/L    Anion gap 7 5 - 15 mmol/L    Glucose 88 65 - 100 mg/dL    BUN 5 (L) 6 - 20 MG/DL    Creatinine 0.75 0.55 - 1.02 MG/DL    BUN/Creatinine ratio 7 (L) 12 - 20      GFR est AA >60 >60 ml/min/1.73m2    GFR est non-AA >60 >60 ml/min/1.73m2    Calcium 8.4 (L) 8.5 - 10.1 MG/DL    Bilirubin, total 0.4 0.2 - 1.0 MG/DL    ALT (SGPT) 31 12 - 78 U/L    AST (SGOT) 35 15 - 37 U/L    Alk. phosphatase 231 (H) 45 - 117 U/L    Protein, total 5.8 (L) 6.4 - 8.2 g/dL    Albumin 2.5 (L) 3.5 - 5.0 g/dL    Globulin 3.3 2.0 - 4.0 g/dL    A-G Ratio 0.8 (L) 1.1 - 2.2         Assessment/Plan:  Diagnoses and all orders for this visit:    Traveler's diarrhea  -     METABOLIC PANEL, COMPREHENSIVE; Future  -     CBC WITH AUTOMATED DIFF; Future  -     metroNIDAZOLE (FLAGYL) 250 mg tablet;  Take 1 Tablet by mouth two (2) times a day for 5 days. , Normal, Disp-10 Tablet, R-0  -     ciprofloxacin HCl (CIPRO) 250 mg tablet; Take 1 Tablet by mouth every twelve (12) hours for 5 days. , Normal, Disp-10 Tablet, R-0  -     METABOLIC PANEL, COMPREHENSIVE  -     CBC WITH AUTOMATED DIFF    Vitamin D deficiency  -     VITAMIN D, 25 HYDROXY; Future  -     VITAMIN D, 25 HYDROXY    Anemia, normocytic normochromic  -     CBC WITH AUTOMATED DIFF; Future  -     CBC WITH AUTOMATED DIFF    IGT (impaired glucose tolerance)  -     HEMOGLOBIN A1C WITH EAG; Future  -     HEMOGLOBIN A1C WITH EAG    Frequency of urination  -     URINALYSIS W/ RFLX MICROSCOPIC; Future  -     URINALYSIS W/ RFLX MICROSCOPIC    Dyslipidemia  -     LIPID PANEL; Future  -     LIPID PANEL    Fatigue, unspecified type  -     METABOLIC PANEL, COMPREHENSIVE; Future  -     CBC WITH AUTOMATED DIFF; Future  -     METABOLIC PANEL, COMPREHENSIVE  -     CBC WITH AUTOMATED DIFF    Hypothyroidism, unspecified type  -     TSH 3RD GENERATION; Future  -     TSH 3RD GENERATION    Nausea  -     ondansetron (ZOFRAN ODT) 4 mg disintegrating tablet; Take 1 Tablet by mouth every eight (8) hours as needed for Nausea or Vomiting., Normal, Disp-20 Tablet, R-0    Other orders  -     MICROSCOPIC EXAMINATION      Patient Instructions          Traveler's Diarrhea: Care Instructions  Your Care Instructions     Traveler's diarrhea is loose, watery bowel movements you can get when you travel. It also can cause vomiting and belly cramps. This kind of diarrhea is usually caused by bacteria. But sometimes it is caused by a parasite or virus. Most people get it when they eat undercooked, raw, or contaminated foods. You can also get it if you drink contaminated water or if you drink something that has contaminated ice cubes in it. In some cases, new foods can cause diarrhea. In other cases, the stress and anxiety of travel can cause it. Traveler's diarrhea usually isn't serious.  Most of the time, bowel movements return to normal quickly. The most important thing is to prevent dehydration. Make sure to drink a lot of fluids. Follow-up care is a key part of your treatment and safety. Be sure to make and go to all appointments, and call your doctor if you are having problems. It's also a good idea to know your test results and keep a list of the medicines you take. How can you care for yourself at home? · Watch for signs of dehydration. This means your body has lost too much water. Dehydration is serious and needs to be treated right away. Signs of dehydration are:  ? Feeling more thirsty than usual.  ? Dry eyes and mouth. ? Feeling faint or lightheaded. ? A smaller amount of urine than normal.  · To prevent dehydration, drink plenty of fluids. Choose bottled or boiled water and other clear liquids until you feel better. If you have kidney, heart, or liver disease and have to limit fluids, talk with your doctor before you increase the amount of fluids you drink. · Start to eat small amounts of mild foods if you feel like it. · Your doctor may recommend an over-the-counter medicine. These may include bismuth subsalicylate (Pepto-Bismol) or loperamide (Imodium). Read and follow all instructions on the label. Do not use these medicines if your doctor does not recommend them. · Be safe with medicines. If your doctor recommends prescription medicine, take it as prescribed. Call your doctor if you think you are having a problem with your medicine. You will get more details on the specific medicines your doctor prescribes. · If your doctor prescribes antibiotics, take them as directed. Do not stop taking them just because you feel better. You need to take the full course of antibiotics. When should you call for help? Call 911 anytime you think you may need emergency care. For example, call if:    · You passed out (lost consciousness).     · Your stools are maroon or very bloody.    Call your doctor now or seek immediate medical care if:    · You are dizzy or lightheaded, or you feel like you may faint.     · Your stools are black and look like tar, or they have streaks of blood.     · You have diarrhea and your belly pain or cramps are worse.     · You have signs of needing more fluids. You have sunken eyes, a dry mouth, and pass only a little urine. Watch closely for changes in your health, and be sure to contact your doctor if:    · You have 12 or more loose stools in 24 hours.     · You see pus in the diarrhea.     · You have a new or higher fever.     · Your diarrhea does not get better or is more frequent. Where can you learn more? Go to http://www.gray.com/  Enter L368 in the search box to learn more about \"Traveler's Diarrhea: Care Instructions. \"  Current as of: September 20, 2021               Content Version: 13.2  © 6874-3088 [employer]. Care instructions adapted under license by [employer] (which disclaims liability or warranty for this information). If you have questions about a medical condition or this instruction, always ask your healthcare professional. [place] [address] any warranty or liability for your use of this information. Follow-up and Dispositions    · Return in about 8 days (around 6/15/2022) for routine follow up.

## 2022-06-08 LAB
25(OH)D3+25(OH)D2 SERPL-MCNC: 28.4 NG/ML (ref 30–100)
ALBUMIN SERPL-MCNC: 4.8 G/DL (ref 3.8–4.8)
ALBUMIN/GLOB SERPL: 2.1 {RATIO} (ref 1.2–2.2)
ALP SERPL-CCNC: 87 IU/L (ref 44–121)
ALT SERPL-CCNC: 16 IU/L (ref 0–32)
APPEARANCE UR: CLEAR
AST SERPL-CCNC: 15 IU/L (ref 0–40)
BACTERIA #/AREA URNS HPF: ABNORMAL /[HPF]
BASOPHILS # BLD AUTO: 0.1 X10E3/UL (ref 0–0.2)
BASOPHILS NFR BLD AUTO: 1 %
BILIRUB SERPL-MCNC: 0.5 MG/DL (ref 0–1.2)
BILIRUB UR QL STRIP: NEGATIVE
BUN SERPL-MCNC: 11 MG/DL (ref 6–20)
BUN/CREAT SERPL: 12 (ref 9–23)
CALCIUM SERPL-MCNC: 9.7 MG/DL (ref 8.7–10.2)
CASTS URNS QL MICRO: ABNORMAL /LPF
CHLORIDE SERPL-SCNC: 104 MMOL/L (ref 96–106)
CHOLEST SERPL-MCNC: 189 MG/DL (ref 100–199)
CO2 SERPL-SCNC: 24 MMOL/L (ref 20–29)
COLOR UR: YELLOW
CREAT SERPL-MCNC: 0.95 MG/DL (ref 0.57–1)
EGFR: 82 ML/MIN/1.73
EOSINOPHIL # BLD AUTO: 0.3 X10E3/UL (ref 0–0.4)
EOSINOPHIL NFR BLD AUTO: 3 %
EPI CELLS #/AREA URNS HPF: ABNORMAL /HPF (ref 0–10)
ERYTHROCYTE [DISTWIDTH] IN BLOOD BY AUTOMATED COUNT: 11.7 % (ref 11.7–15.4)
EST. AVERAGE GLUCOSE BLD GHB EST-MCNC: 100 MG/DL
GLOBULIN SER CALC-MCNC: 2.3 G/DL (ref 1.5–4.5)
GLUCOSE SERPL-MCNC: 80 MG/DL (ref 65–99)
GLUCOSE UR QL STRIP: NEGATIVE
HBA1C MFR BLD: 5.1 % (ref 4.8–5.6)
HCT VFR BLD AUTO: 45.1 % (ref 34–46.6)
HDLC SERPL-MCNC: 47 MG/DL
HGB BLD-MCNC: 15.6 G/DL (ref 11.1–15.9)
HGB UR QL STRIP: ABNORMAL
IMM GRANULOCYTES # BLD AUTO: 0 X10E3/UL (ref 0–0.1)
IMM GRANULOCYTES NFR BLD AUTO: 0 %
KETONES UR QL STRIP: ABNORMAL
LDLC SERPL CALC-MCNC: 130 MG/DL (ref 0–99)
LEUKOCYTE ESTERASE UR QL STRIP: ABNORMAL
LYMPHOCYTES # BLD AUTO: 2.1 X10E3/UL (ref 0.7–3.1)
LYMPHOCYTES NFR BLD AUTO: 27 %
MCH RBC QN AUTO: 32.3 PG (ref 26.6–33)
MCHC RBC AUTO-ENTMCNC: 34.6 G/DL (ref 31.5–35.7)
MCV RBC AUTO: 93 FL (ref 79–97)
MICRO URNS: ABNORMAL
MONOCYTES # BLD AUTO: 0.6 X10E3/UL (ref 0.1–0.9)
MONOCYTES NFR BLD AUTO: 7 %
NEUTROPHILS # BLD AUTO: 4.8 X10E3/UL (ref 1.4–7)
NEUTROPHILS NFR BLD AUTO: 62 %
NITRITE UR QL STRIP: NEGATIVE
PH UR STRIP: 5.5 [PH] (ref 5–7.5)
PLATELET # BLD AUTO: 332 X10E3/UL (ref 150–450)
POTASSIUM SERPL-SCNC: 4.2 MMOL/L (ref 3.5–5.2)
PROT SERPL-MCNC: 7.1 G/DL (ref 6–8.5)
PROT UR QL STRIP: ABNORMAL
RBC # BLD AUTO: 4.83 X10E6/UL (ref 3.77–5.28)
RBC #/AREA URNS HPF: ABNORMAL /HPF (ref 0–2)
SODIUM SERPL-SCNC: 143 MMOL/L (ref 134–144)
SP GR UR STRIP: >=1.03 (ref 1–1.03)
TRIGL SERPL-MCNC: 65 MG/DL (ref 0–149)
TSH SERPL DL<=0.005 MIU/L-ACNC: 1.68 UIU/ML (ref 0.45–4.5)
UROBILINOGEN UR STRIP-MCNC: 0.2 MG/DL (ref 0.2–1)
VLDLC SERPL CALC-MCNC: 12 MG/DL (ref 5–40)
WBC # BLD AUTO: 7.8 X10E3/UL (ref 3.4–10.8)
WBC #/AREA URNS HPF: ABNORMAL /HPF (ref 0–5)

## 2022-06-15 ENCOUNTER — OFFICE VISIT (OUTPATIENT)
Dept: FAMILY MEDICINE CLINIC | Age: 32
End: 2022-06-15
Payer: MEDICAID

## 2022-06-15 VITALS
WEIGHT: 210 LBS | TEMPERATURE: 97.5 F | HEART RATE: 78 BPM | HEIGHT: 67 IN | OXYGEN SATURATION: 98 % | SYSTOLIC BLOOD PRESSURE: 124 MMHG | RESPIRATION RATE: 20 BRPM | DIASTOLIC BLOOD PRESSURE: 73 MMHG | BODY MASS INDEX: 32.96 KG/M2

## 2022-06-15 DIAGNOSIS — E78.5 DYSLIPIDEMIA: ICD-10-CM

## 2022-06-15 DIAGNOSIS — E55.9 VITAMIN D DEFICIENCY: Primary | ICD-10-CM

## 2022-06-15 PROCEDURE — 99214 OFFICE O/P EST MOD 30 MIN: CPT | Performed by: INTERNAL MEDICINE

## 2022-06-15 RX ORDER — ACETAMINOPHEN 500 MG
2000 TABLET ORAL DAILY
Qty: 90 CAPSULE | Refills: 1 | Status: SHIPPED | OUTPATIENT
Start: 2022-06-15

## 2022-06-15 NOTE — PATIENT INSTRUCTIONS
Learning About Foods With Healthy Fats  What foods contain healthy fats? The foods you eat contain nutrients, such as vitamins and minerals. Fat is a nutrient. Your body needs the right amount to stay healthy and work as it should. You can use the list below to help you make choices about which foods to eat. Here are some foods that contain healthy fats. Unsaturated fats and oils  · Canola oil  · Corn oil  · Flaxseed oil  · Olive oil  · Peanut oil  · Safflower oil  · Sunflower oil  Seafood  · Herring  · Lake trout  · Mackerel  · Oysters  · [place]  · Sardines  Nuts and seeds  · Almonds  · [place] seeds  · Flaxseeds (ground)  · Hazelnuts  · Nut butters (such as peanut and almond)  · Pumpkin seeds  · Sunflower seeds  · Walnuts  Fruits  · Avocados  · Olives  Work with your doctor to find out how much of this nutrient you need. Depending on your health, you may need more or less of it in your diet. Where can you learn more? Go to http://www.francisco.com/  Enter F360 in the search box to learn more about \"Learning About Foods With Healthy Fats. \"  Current as of: September 8, 2021               Content Version: 13.2  © 2006-2022 [employer]. Care instructions adapted under license by [employer] (which disclaims liability or warranty for this information). If you have questions about a medical condition or this instruction, always ask your healthcare professional. [place] [address] any warranty or liability for your use of this information.

## 2022-06-15 NOTE — LETTER
6/15/2022    Ms. Chante Huitron  [id number]      Dear Chante Huitron:    Please find your most recent results below. LDL is slightly increased, Vit D is slightly low, Urine shows infection    Resulted Orders   METABOLIC PANEL, COMPREHENSIVE   Result Value Ref Range    Glucose 80 65 - 99 mg/dL    BUN 11 6 - 20 mg/dL    Creatinine 0.95 0.57 - 1.00 mg/dL    eGFR 82 >59 mL/min/1.73    BUN/Creatinine ratio 12 9 - 23    Sodium 143 134 - 144 mmol/L    Potassium 4.2 3.5 - 5.2 mmol/L    Chloride 104 96 - 106 mmol/L    CO2 24 20 - 29 mmol/L    Calcium 9.7 8.7 - 10.2 mg/dL    Protein, total 7.1 6.0 - 8.5 g/dL    Albumin 4.8 3.8 - 4.8 g/dL    GLOBULIN, TOTAL 2.3 1.5 - 4.5 g/dL    A-G Ratio 2.1 1.2 - 2.2    Bilirubin, total 0.5 0.0 - 1.2 mg/dL    Alk. phosphatase 87 44 - 121 IU/L    AST (SGOT) 15 0 - 40 IU/L    ALT (SGPT) 16 0 - 32 IU/L    Narrative    Performed at:  [address] [facility] [address]  [id number]  : Matt Baer MD, Phone:  [phone]   CBC WITH AUTOMATED DIFF   Result Value Ref Range    WBC 7.8 3.4 - 10.8 x10E3/uL    RBC 4.83 3.77 - 5.28 x10E6/uL    HGB 15.6 11.1 - 15.9 g/dL    HCT 45.1 34.0 - 46.6 %    MCV 93 79 - 97 fL    MCH 32.3 26.6 - 33.0 pg    MCHC 34.6 31.5 - 35.7 g/dL    RDW 11.7 11.7 - 15.4 %    PLATELET 303 953 - 797 x10E3/uL    NEUTROPHILS 62 Not Estab. %    Lymphocytes 27 Not Estab. %    MONOCYTES 7 Not Estab. %    EOSINOPHILS 3 Not Estab. %    BASOPHILS 1 Not Estab. %    ABS. NEUTROPHILS 4.8 1.4 - 7.0 x10E3/uL    Abs Lymphocytes 2.1 0.7 - 3.1 x10E3/uL    ABS. MONOCYTES 0.6 0.1 - 0.9 x10E3/uL    ABS. EOSINOPHILS 0.3 0.0 - 0.4 x10E3/uL    ABS. BASOPHILS 0.1 0.0 - 0.2 x10E3/uL    IMMATURE GRANULOCYTES 0 Not Estab. %    ABS. IMM.  GRANS. 0.0 0.0 - 0.1 x10E3/uL    Narrative    Performed at:  [facility]  [address]  [id number]  : Matt Baer MD, Phone:  [phone]   LIPID PANEL   Result Value Ref Range    Cholesterol, total 189 100 - 199 mg/dL    Triglyceride 65 0 - 149 mg/dL    HDL Cholesterol 47 >39 mg/dL    VLDL, calculated 12 5 - 40 mg/dL    LDL, calculated 130 (H) 0 - 99 mg/dL    Narrative    Performed at:  [facility] [address]  [id number]  : Cl Padilla MD, Phone:  [phone]   HEMOGLOBIN A1C WITH EAG   Result Value Ref Range    Hemoglobin A1c 5.1 4.8 - 5.6 %    Estimated average glucose 100 mg/dL    Narrative    Performed at:  [address] [place] [address]  [id number]  : Cl Padilla MD, Phone:  [phone]   TSH 3RD GENERATION   Result Value Ref Range    TSH 1.680 0.450 - 4.500 uIU/mL    Narrative    Performed at:  [address] [place] [address]  [id number]  : Cl Padilla MD, Phone:  [phone]   VITAMIN D, 25 HYDROXY   Result Value Ref Range    VITAMIN D, 25-HYDROXY 28.4 (L) 30.0 - 100.0 ng/mL    Narrative    Performed at:  [address] [place] [address]  [id number]  : Cl Padilla MD, Phone:  [phone]   URINALYSIS W/ RFLX MICROSCOPIC   Result Value Ref Range    Specific Gravity      >=1.030 (A) 1.005 - 1.030    pH (UA) 5.5 5.0 - 7.5    Color Yellow Yellow    Appearance Clear Clear    Leukocyte Esterase 1+ (A) Negative    Protein 1+ (A) Negative/Trace    Glucose Negative Negative    Ketone Trace (A) Negative    Blood 3+ (A) Negative    Bilirubin Negative Negative    Urobilinogen 0.2 0.2 - 1.0 mg/dL    Nitrites Negative Negative    Microscopic Examination See additional order     Narrative    Performed at:  [address] [place] [address]  [id number]  : Cl Padilla MD, Phone:  [phone]   MICROSCOPIC EXAMINATION   Result Value Ref Range    WBC 6-10 (A) 0 - 5 /hpf    RBC 3-10 (A) 0 - 2 /hpf    Epithelial cells 0-10 0 - 10 /hpf    Casts None seen None seen /lpf Bacteria Few None seen/Few    Narrative    Performed at:  [facility]  [address]  [id number]  : Jari Krabbe MD, Phone:  [phone]       RECOMMENDATIONS:  Work on diet and exercise. Continue with current  medications.     Please call me if you have any questions: [phone]    Sincerely,      Kalen Radford MD

## 2022-06-15 NOTE — PROGRESS NOTES
Chief Complaint   Patient presents with    Results     HPI:  Lynette Blanc is a 32 y.o. female presents to review results. LDL is slightly increased, Vit D is slightly low, Urine shows infection  Also, diarrhea has results. Review of Systems  As per hpi    Past Medical History:   Diagnosis Date    Anemia     Chlamydia 2014    ESCOBAR II (cervical intraepithelial neoplasia II)     Dysplasia of cervix, low grade (ESCOBAR 1)     Encounter for insertion of mirena IUD 2018    Mirena placed    Encounter for IUD removal 2020    Essential hypertension     Gestational hypertension     Headache      Past Surgical History:   Procedure Laterality Date    HX ACL RECONSTRUCTION Left     HX ACL RECONSTRUCTION  2019    HX ADENOIDECTOMY      HX COLPOSCOPY      4/28/10- ecto ESCOBAR 2----12 ESCOBAR 1---13-negative    HX GYN      IUD    HX LAP CHOLECYSTECTOMY  10/04/2016    Laparoscopic cholecystectomy by Dr. Fermin Hernandez.  HX OTHER SURGICAL      HX TONSILLECTOMY       Social History     Socioeconomic History    Marital status: SINGLE   Tobacco Use    Smoking status: Former Smoker     Packs/day: 0.25     Years: 0.50     Pack years: 0.12     Quit date:      Years since quittin.4    Smokeless tobacco: Never Used   Vaping Use    Vaping Use: Never used   Substance and Sexual Activity    Alcohol use: Not Currently     Comment: social    Drug use: Not Currently     Comment:     Sexual activity: Yes     Partners: Male     Birth control/protection: None, I.U.D.      Family History   Adopted: Yes   Problem Relation Age of Onset    No Known Problems Mother     No Known Problems Father     Diabetes Maternal Uncle     Hypertension Maternal Grandmother     Gall Bladder Disease Maternal Grandmother     Anesth Problems Neg Hx      Current Outpatient Medications   Medication Sig Dispense Refill    azelastine (ASTELIN) 137 mcg (0.1 %) nasal spray SPRAY 1-2 SPRAY INTO BOTH NOSTRILS TWICE A DAY      montelukast (SINGULAIR) 10 mg tablet Take 10 mg by mouth daily.  ondansetron (ZOFRAN ODT) 4 mg disintegrating tablet Take 1 Tablet by mouth every eight (8) hours as needed for Nausea or Vomiting. 20 Tablet 0    ibuprofen (MOTRIN) 800 mg tablet Take 1 Tablet by mouth every eight (8) hours as needed for Pain. 30 Tablet 0    ondansetron (ZOFRAN ODT) 8 mg disintegrating tablet Take 1 Tablet by mouth every eight (8) hours as needed for Nausea or Vomiting.  30 Tablet 4     Allergies   Allergen Reactions    Depo-Provera [Medroxyprogesterone] Hives    Reclipsen (28) [Desogestrel-Ethinyl Estradiol] Hives    Sulfa (Sulfonamide Antibiotics) Hives     As child    Penicillins Hives and Rash    Sulfamethoxazole-Trimethoprim Rash       Objective:  Visit Vitals  /73   Pulse 78   Temp 97.5 °F (36.4 °C) (Temporal)   Resp 20   Ht 5' 7\" (1.702 m)   Wt 210 lb (95.3 kg)   LMP 06/07/2022   SpO2 98%   BMI 32.89 kg/m²     Physical Exam:   General appearance - alert, well appearing in no distress  Mental status - alert, oriented to person, place, and time  EYE-PERRL, EOMI  Neck - supple, no significant adenopathy   Chest - clear to auscultation, no wheezes, rales or rhonchi  Heart - normal rate, regular rhythm, no murmurs  Abdomen - soft, nontender, nondistended, no organomegaly  Ext-peripheral pulses normal, no pedal edema    Results for orders placed or performed in visit on 94/19/43   METABOLIC PANEL, COMPREHENSIVE   Result Value Ref Range    Glucose 80 65 - 99 mg/dL    BUN 11 6 - 20 mg/dL    Creatinine 0.95 0.57 - 1.00 mg/dL    eGFR 82 >59 mL/min/1.73    BUN/Creatinine ratio 12 9 - 23    Sodium 143 134 - 144 mmol/L    Potassium 4.2 3.5 - 5.2 mmol/L    Chloride 104 96 - 106 mmol/L    CO2 24 20 - 29 mmol/L    Calcium 9.7 8.7 - 10.2 mg/dL    Protein, total 7.1 6.0 - 8.5 g/dL    Albumin 4.8 3.8 - 4.8 g/dL    GLOBULIN, TOTAL 2.3 1.5 - 4.5 g/dL    A-G Ratio 2.1 1.2 - 2.2    Bilirubin, total 0.5 0.0 - 1.2 mg/dL Alk. phosphatase 87 44 - 121 IU/L    AST (SGOT) 15 0 - 40 IU/L    ALT (SGPT) 16 0 - 32 IU/L   CBC WITH AUTOMATED DIFF   Result Value Ref Range    WBC 7.8 3.4 - 10.8 x10E3/uL    RBC 4.83 3.77 - 5.28 x10E6/uL    HGB 15.6 11.1 - 15.9 g/dL    HCT 45.1 34.0 - 46.6 %    MCV 93 79 - 97 fL    MCH 32.3 26.6 - 33.0 pg    MCHC 34.6 31.5 - 35.7 g/dL    RDW 11.7 11.7 - 15.4 %    PLATELET 187 491 - 469 x10E3/uL    NEUTROPHILS 62 Not Estab. %    Lymphocytes 27 Not Estab. %    MONOCYTES 7 Not Estab. %    EOSINOPHILS 3 Not Estab. %    BASOPHILS 1 Not Estab. %    ABS. NEUTROPHILS 4.8 1.4 - 7.0 x10E3/uL    Abs Lymphocytes 2.1 0.7 - 3.1 x10E3/uL    ABS. MONOCYTES 0.6 0.1 - 0.9 x10E3/uL    ABS. EOSINOPHILS 0.3 0.0 - 0.4 x10E3/uL    ABS. BASOPHILS 0.1 0.0 - 0.2 x10E3/uL    IMMATURE GRANULOCYTES 0 Not Estab. %    ABS. IMM.  GRANS. 0.0 0.0 - 0.1 x10E3/uL   LIPID PANEL   Result Value Ref Range    Cholesterol, total 189 100 - 199 mg/dL    Triglyceride 65 0 - 149 mg/dL    HDL Cholesterol 47 >39 mg/dL    VLDL, calculated 12 5 - 40 mg/dL    LDL, calculated 130 (H) 0 - 99 mg/dL   HEMOGLOBIN A1C WITH EAG   Result Value Ref Range    Hemoglobin A1c 5.1 4.8 - 5.6 %    Estimated average glucose 100 mg/dL   TSH 3RD GENERATION   Result Value Ref Range    TSH 1.680 0.450 - 4.500 uIU/mL   VITAMIN D, 25 HYDROXY   Result Value Ref Range    VITAMIN D, 25-HYDROXY 28.4 (L) 30.0 - 100.0 ng/mL   URINALYSIS W/ RFLX MICROSCOPIC   Result Value Ref Range    Specific Gravity      >=1.030 (A) 1.005 - 1.030    pH (UA) 5.5 5.0 - 7.5    Color Yellow Yellow    Appearance Clear Clear    Leukocyte Esterase 1+ (A) Negative    Protein 1+ (A) Negative/Trace    Glucose Negative Negative    Ketone Trace (A) Negative    Blood 3+ (A) Negative    Bilirubin Negative Negative    Urobilinogen 0.2 0.2 - 1.0 mg/dL    Nitrites Negative Negative    Microscopic Examination See additional order    MICROSCOPIC EXAMINATION   Result Value Ref Range    WBC 6-10 (A) 0 - 5 /hpf    RBC 3-10 (A) 0 - 2 /hpf    Epithelial cells 0-10 0 - 10 /hpf    Casts None seen None seen /lpf    Bacteria Few None seen/Few     Assessment/Plan:  Diagnoses and all orders for this visit:    Vitamin D deficiency  -     cholecalciferol (VITAMIN D3) (2,000 UNITS /50 MCG) cap capsule; Take 1 Capsule by mouth daily. , Normal, Disp-90 Capsule, R-1    Dyslipidemia      Patient Instructions        Learning About Foods With Healthy Fats  What foods contain healthy fats? The foods you eat contain nutrients, such as vitamins and minerals. Fat is a nutrient. Your body needs the right amount to stay healthy and work as it should. You can use the list below to help you make choices about which foods to eat. Here are some foods that contain healthy fats. Unsaturated fats and oils  · Canola oil  · Corn oil  · Flaxseed oil  · Olive oil  · Peanut oil  · Safflower oil  · Sunflower oil  Seafood  · Herring  · Lake trout  · Mackerel  · Oysters  · Gunter  · Sardines  Nuts and seeds  · Almonds  · Britton seeds  · Flaxseeds (ground)  · Hazelnuts  · Nut butters (such as peanut and almond)  · Pumpkin seeds  · Sunflower seeds  · Walnuts  Fruits  · Avocados  · Olives  Work with your doctor to find out how much of this nutrient you need. Depending on your health, you may need more or less of it in your diet. Where can you learn more? Go to http://www.francisco.com/  Enter F360 in the search box to learn more about \"Learning About Foods With Healthy Fats. \"  Current as of: September 8, 2021               Content Version: 13.2  © 2006-2022 [employer]. Care instructions adapted under license by [employer] (which disclaims liability or warranty for this information). If you have questions about a medical condition or this instruction, always ask your healthcare professional. [place] [address] any warranty or liability for your use of this information.         Follow-up and Dispositions    · Return in about 6 months (around 12/15/2022), or if symptoms worsen or fail to improve, for routine follow up. a few with stimulation

## 2022-07-13 NOTE — PROGRESS NOTES
Bleeding minimal. Occ spotting. Baby moving. Glucola today. Rh pos.  Tdap given Pt wanting to speak with NP regarding Pap Smear results, Please advise

## 2022-08-06 ENCOUNTER — HOSPITAL ENCOUNTER (EMERGENCY)
Age: 32
Discharge: HOME | End: 2022-08-06
Attending: STUDENT IN AN ORGANIZED HEALTH CARE EDUCATION/TRAINING PROGRAM
Payer: MEDICAID

## 2022-08-06 ENCOUNTER — APPOINTMENT (OUTPATIENT)
Dept: GENERAL RADIOLOGY | Age: 32
End: 2022-08-06
Attending: PHYSICIAN ASSISTANT
Payer: MEDICAID

## 2022-08-06 VITALS
TEMPERATURE: 98.1 F | SYSTOLIC BLOOD PRESSURE: 125 MMHG | OXYGEN SATURATION: 100 % | HEIGHT: 67 IN | BODY MASS INDEX: 32.96 KG/M2 | HEART RATE: 86 BPM | DIASTOLIC BLOOD PRESSURE: 85 MMHG | RESPIRATION RATE: 18 BRPM | WEIGHT: 210 LBS

## 2022-08-06 DIAGNOSIS — M79.672 LEFT FOOT PAIN: Primary | ICD-10-CM

## 2022-08-06 PROCEDURE — 74011250637 HC RX REV CODE- 250/637: Performed by: PHYSICIAN ASSISTANT

## 2022-08-06 PROCEDURE — 99283 EMERGENCY DEPT VISIT LOW MDM: CPT

## 2022-08-06 PROCEDURE — 73630 X-RAY EXAM OF FOOT: CPT

## 2022-08-06 RX ORDER — DICLOFENAC SODIUM 10 MG/G
1 GEL TOPICAL 4 TIMES DAILY
Qty: 150 G | Refills: 0 | Status: SHIPPED | OUTPATIENT
Start: 2022-08-06

## 2022-08-06 RX ORDER — METHYLPREDNISOLONE 4 MG/1
4 TABLET ORAL
Qty: 1 DOSE PACK | Refills: 0 | Status: SHIPPED | OUTPATIENT
Start: 2022-08-06

## 2022-08-06 RX ORDER — HYDROCODONE BITARTRATE AND ACETAMINOPHEN 5; 325 MG/1; MG/1
1 TABLET ORAL
Qty: 5 TABLET | Refills: 0 | Status: SHIPPED | OUTPATIENT
Start: 2022-08-06 | End: 2022-08-09

## 2022-08-06 RX ORDER — HYDROCODONE BITARTRATE AND ACETAMINOPHEN 5; 325 MG/1; MG/1
1 TABLET ORAL ONCE
Status: COMPLETED | OUTPATIENT
Start: 2022-08-06 | End: 2022-08-06

## 2022-08-06 RX ADMIN — HYDROCODONE BITARTRATE AND ACETAMINOPHEN 1 TABLET: 5; 325 TABLET ORAL at 10:21

## 2022-08-06 NOTE — ED PROVIDER NOTES
EMERGENCY DEPARTMENT HISTORY AND PHYSICAL EXAM      Date: 8/6/2022  Patient Name: Nanci Williamson    History of Presenting Illness     Chief Complaint   Patient presents with    Foot Pain     Pt wheelchair into triage with cc of left foot pain from arch of foot to heel, she dove for a ball yesterday and felt that something was over stretched. Unable to bear weight at this time       History Provided By: Patient    HPI: Nanci Williamson, 32 y.o. female with significant PMHx for anemia and HTN, per patient and record review, presents  to the ED with cc of left foot pain since yesterday. Patient states she went to dive for a ball and feels as though she over stretching something in her foot. States she was on the balls of her feet and pushed off and felt the pain. Denies head strike, LOC or sustaining any additional injuries from the incident. States the pain is a constant aching pain to the bottom of her left foot from the arch of the foot to the heel. She has been able to weight-bear, although pain is worse with ambulation. Minimal improvement with over-the-counter pain relievers and with compression (wrap). Denies swelling, redness, and warmth. Denies extremity numbness, weakness, or tingling. No prior history of surgeries to the area. Denies fevers or chills, cough, chest pain or shortness of breath. States he is otherwise in her usual state of health. There are no other complaints, changes, or physical findings at this time. PCP: Deja Gardiner MD    No current facility-administered medications on file prior to encounter. Current Outpatient Medications on File Prior to Encounter   Medication Sig Dispense Refill    cholecalciferol (VITAMIN D3) (2,000 UNITS /50 MCG) cap capsule Take 1 Capsule by mouth daily. 90 Capsule 1    ibuprofen (MOTRIN) 800 mg tablet Take 1 Tablet by mouth every eight (8) hours as needed for Pain.  30 Tablet 0    [DISCONTINUED] azelastine (ASTELIN) 137 mcg (0.1 %) nasal spray SPRAY 1-2 SPRAY INTO BOTH NOSTRILS TWICE A DAY      [DISCONTINUED] montelukast (SINGULAIR) 10 mg tablet Take 10 mg by mouth daily. [DISCONTINUED] ondansetron (ZOFRAN ODT) 4 mg disintegrating tablet Take 1 Tablet by mouth every eight (8) hours as needed for Nausea or Vomiting. 20 Tablet 0       Past History     Past Medical History:  Past Medical History:   Diagnosis Date    Anemia     Chlamydia 2014    ESCOBAR II (cervical intraepithelial neoplasia II)     Dysplasia of cervix, low grade (ESCOBAR 1)     Encounter for insertion of mirena IUD 2018    Mirena placed    Encounter for IUD removal 2020    Essential hypertension     Gestational hypertension     Headache        Past Surgical History:  Past Surgical History:   Procedure Laterality Date    HX ACL RECONSTRUCTION Left     HX ACL RECONSTRUCTION      HX ADENOIDECTOMY      HX COLPOSCOPY      4/28/10- ecto ESCOBAR 2----12 ESCOBAR 1---13-negative    HX GYN      IUD    HX LAP CHOLECYSTECTOMY  10/04/2016    Laparoscopic cholecystectomy by Dr. Jeanie Lowry. HX OTHER SURGICAL      HX TONSILLECTOMY         Family History:  Family History   Adopted: Yes   Problem Relation Age of Onset    No Known Problems Mother     No Known Problems Father     Diabetes Maternal Uncle     Hypertension Maternal Grandmother     Gall Bladder Disease Maternal Grandmother     Anesth Problems Neg Hx        Social History:  Social History     Tobacco Use    Smoking status: Former     Packs/day: 0.25     Years: 0.50     Pack years: 0.13     Types: Cigarettes     Quit date:      Years since quittin.5    Smokeless tobacco: Never   Vaping Use    Vaping Use: Never used   Substance Use Topics    Alcohol use: Not Currently     Comment: social    Drug use: Not Currently     Comment:        Allergies:   Allergies   Allergen Reactions    Depo-Provera [Medroxyprogesterone] Hives    Reclipsen (28) [Desogestrel-Ethinyl Estradiol] Hives    Sulfa (Sulfonamide Antibiotics) Hives As child    Penicillins Hives and Rash    Sulfamethoxazole-Trimethoprim Rash         Review of Systems   Review of Systems   Constitutional:  Negative for appetite change, chills and fever. HENT:  Negative for congestion. Eyes:  Negative for pain. Respiratory:  Negative for cough and shortness of breath. Cardiovascular:  Negative for chest pain. Gastrointestinal:  Negative for abdominal pain, constipation, diarrhea, nausea and vomiting. Genitourinary:  Negative for difficulty urinating, dysuria and frequency. Musculoskeletal:  Positive for arthralgias and gait problem. Negative for neck pain and neck stiffness. Skin:  Negative for rash. Neurological:  Negative for syncope, weakness, numbness and headaches. All other systems reviewed and are negative. Physical Exam   Physical Exam  Vitals and nursing note reviewed. Constitutional:       General: She is not in acute distress. Appearance: Normal appearance. She is not ill-appearing, toxic-appearing or diaphoretic. Comments: 32 y.o. female   HENT:      Head: Normocephalic and atraumatic. Nose: Nose normal.      Mouth/Throat:      Mouth: Mucous membranes are moist.   Eyes:      Extraocular Movements: Extraocular movements intact. Conjunctiva/sclera: Conjunctivae normal.   Cardiovascular:      Rate and Rhythm: Normal rate. Pulses: Normal pulses. Pulmonary:      Effort: Pulmonary effort is normal. No respiratory distress. Abdominal:      General: There is no distension. Musculoskeletal:         General: Normal range of motion. Cervical back: Normal range of motion. Feet:    Feet:      Comments: TTP to bottom of let foot and over arch. No tenderness to medial and lateral malleolus, no midfoot tenderness, no lateral joint line tenderness, no proximal fibular tenderness, no Achilles tenderness. No warmth, erythema, or edema.   Compartment soft, no obvious deformities, distal sensation intact, strong DP and PT pulses. Skin:     General: Skin is warm and dry. Neurological:      General: No focal deficit present. Mental Status: She is alert and oriented to person, place, and time. Psychiatric:         Mood and Affect: Mood normal.         Behavior: Behavior normal.       Diagnostic Study Results     Labs -   No results found for this or any previous visit (from the past 12 hour(s)). Radiologic Studies -   XR FOOT LT MIN 3 V   Final Result   No acute abnormality. CT Results  (Last 48 hours)      None          CXR Results  (Last 48 hours)      None              Medical Decision Making   I am the first provider for this patient. I reviewed the vital signs, available nursing notes, past medical history, past surgical history, family history and social history. Vital Signs-Reviewed the patient's vital signs. Patient Vitals for the past 12 hrs:   Temp Pulse Resp BP SpO2   08/06/22 0950 98.1 °F (36.7 °C) 86 18 125/85 100 %       Records Reviewed: Nursing Notes and Old Medical Records    Provider Notes (Medical Decision Making):   Neurovascularly intact, ROM intact. Xray without evidence of acute fracture. Shared decision making performed and care plan created together, discussed imaging results and that a negative xray does not rule out occult fracture or other injury. No evidence of emergent conditions requiring further evaluation/management acutely here at this time. Given minimal improvement with NSAIDs, will place on short course steroid taper and provide short course pain medication. Counseled additional symptomatic management. Orthopedic follow-up. Verbal return precautions advised. Patient verbalizes understanding and agreement of current plan of care. ED Course:   Initial assessment performed. The patients presenting problems have been discussed, and they are in agreement with the care plan formulated and outlined with them.   I have encouraged them to ask questions as they arise throughout their visit. Critical Care Time: None    Disposition:  Discharge   PLAN:  1. Discharge Medication List as of 8/6/2022 10:54 AM        START taking these medications    Details   diclofenac (VOLTAREN) 1 % gel Apply 1 g to affected area four (4) times daily. , Normal, Disp-150 g, R-0      methylPREDNISolone (Medrol, Christian,) 4 mg tablet Take 1 Tablet by mouth Specific Days and Specific Times., Normal, Disp-1 Dose Pack, R-0      HYDROcodone-acetaminophen (NORCO) 5-325 mg per tablet Take 1 Tablet by mouth every six (6) hours as needed for Pain for up to 3 days. Max Daily Amount: 4 Tablets., Normal, Disp-5 Tablet, R-0           CONTINUE these medications which have NOT CHANGED    Details   cholecalciferol (VITAMIN D3) (2,000 UNITS /50 MCG) cap capsule Take 1 Capsule by mouth daily. , Normal, Disp-90 Capsule, R-1      ibuprofen (MOTRIN) 800 mg tablet Take 1 Tablet by mouth every eight (8) hours as needed for Pain., Normal, Disp-30 Tablet, R-0           2. Follow-up Information       Follow up With Specialties Details Why Contact Info    [facility] EMERGENCY DEPT Emergency Medicine  As needed, If symptoms worsen [address] [place] [address]    Bonny Govea MD Internal Medicine Physician   [address] [facility]  [address]  [place] [address] [address]  [address], [address] [place] [address] Consuelo Mojica, OSIEL Podiatry   [address] [phone]      Elizabeth Venegas MD Orthopedic Surgery   [address] [facility]  [address]  [place] [phone]            Return to ED if worse     Diagnosis     Clinical Impression:   1. Left foot pain          Please note that this dictation was completed with [employer], the [employer] voice recognition software.  Quite often unanticipated grammatical, syntax, homophones, and other interpretive errors are inadvertently transcribed by the computer software. Please disregards these errors. Please excuse any errors that have escaped final proofreading.

## 2022-08-06 NOTE — DISCHARGE INSTRUCTIONS
Alternate ibuprofen (oral NSAID) and Tylenol as directed, as needed for mild-moderate pain  Diclofenac gel (topical NSAID)   Lortab as directed, as needed for severe pain (be careful as this medication may cause drowsiness, therefore do not take with other sedating substances)  Steroid taper as prescribed  Rest, ice/heat, stretching, elevation, compression (wrap etc.)

## 2022-08-06 NOTE — Clinical Note
[address]  [facility] EMERGENCY DEPT  [address] [place] [address]  [place] [address] [phone]    Work/School Note    Date: 8/6/2022    To Whom It May concern:    Gabrielle Grove was seen and treated today in the emergency room by the following provider(s):  Attending Provider: Laine Breaux MD  Physician Assistant: [facility] Riz[facility]. Gabrielle Grove is excused from work/school on 8/6/2022 through 8/8/2022. She is medically clear to return to work/school on 8/9/2022.      Please excuse the next 1-3 days as needed/if symptoms persist, thank you     Sincerely,          KAITLYN Torres

## 2022-09-12 NOTE — PROGRESS NOTES
Minerva Lowe is a ,  28 y.o. female BLACK/ whose Patient's last menstrual period was 2022. was on 2022 who presents for her annual checkup. She is having  spotting in between cycles, more after intercourse . Started a few months ago. Usually bleeding will stop morning after IC. Cycles were initially normal after delivery    With regard to the Gardisil vaccine, she has not received it yet. Menstrual status:    Her periods are light, moderate in flow. She is using three to five pads or tampons per day, usually regular and last 26-30 days. She denies dysmenorrhea. She reports no premenstrual symptoms. Contraception:    The current method of family planning is vasectomy and She declines contraception and counseling. Sexual history:    She  reports being sexually active and has had partner(s) who are male. She reports using the following method of birth control/protection: Surgical.    Medical conditions:    Since her last annual GYN exam about one year ago, she has not the following changes in her health history: none. Pap and Mammogram History:    Her most recent Pap smear was 2020 normal/HPV neg    The patient has never had a mammogram.    The patient does not have a family history of breast cancer.     Past Medical History:   Diagnosis Date    Anemia     Chlamydia 2014    ESCOBAR II (cervical intraepithelial neoplasia II)     Dysplasia of cervix, low grade (ESCOBAR 1)     Encounter for insertion of mirena IUD 2018    Mirena placed    Encounter for IUD removal 2020    Essential hypertension     Gestational hypertension     Headache      Past Surgical History:   Procedure Laterality Date    HX ACL RECONSTRUCTION Left     HX ACL RECONSTRUCTION  2019    HX ADENOIDECTOMY      HX COLPOSCOPY      4/28/10- ecto ESCOBAR 2----12 ESCOBAR 1---13-negative    HX GYN      IUD    HX LAP CHOLECYSTECTOMY  10/04/2016    Laparoscopic cholecystectomy by  Napolean Hamburg OTHER SURGICAL      HX TONSILLECTOMY  2014       Current Outpatient Medications   Medication Sig Dispense Refill    diclofenac (VOLTAREN) 1 % gel Apply 1 g to affected area four (4) times daily. 150 g 0    methylPREDNISolone (Medrol, Christian,) 4 mg tablet Take 1 Tablet by mouth Specific Days and Specific Times. 1 Dose Pack 0    cholecalciferol (VITAMIN D3) (2,000 UNITS /50 MCG) cap capsule Take 1 Capsule by mouth daily. 90 Capsule 1    ibuprofen (MOTRIN) 800 mg tablet Take 1 Tablet by mouth every eight (8) hours as needed for Pain.  30 Tablet 0     Allergies: Depo-provera [medroxyprogesterone], Reclipsen (28) [desogestrel-ethinyl estradiol], Sulfa (sulfonamide antibiotics), Penicillins, and Sulfamethoxazole-trimethoprim   Social History     Socioeconomic History    Marital status: SINGLE     Spouse name: Not on file    Number of children: Not on file    Years of education: Not on file    Highest education level: Not on file   Occupational History    Not on file   Tobacco Use    Smoking status: Former     Packs/day: 0.25     Years: 0.50     Pack years: 0.13     Types: Cigarettes     Quit date:      Years since quittin.7    Smokeless tobacco: Never   Vaping Use    Vaping Use: Never used   Substance and Sexual Activity    Alcohol use: Not Currently     Comment: social    Drug use: Not Currently     Comment:     Sexual activity: Yes     Partners: Male     Birth control/protection: Surgical     Comment: vasectomy   Other Topics Concern     Service Not Asked    Blood Transfusions Not Asked    Caffeine Concern Not Asked    Occupational Exposure Not Asked    Hobby Hazards Not Asked    Sleep Concern Not Asked    Stress Concern Not Asked    Weight Concern Not Asked    Special Diet Not Asked    Back Care Not Asked    Exercise Not Asked    Bike Helmet Not Asked    Seat Belt Not Asked    Self-Exams Not Asked   Social History Narrative    Not on file     Social Determinants of Health     Financial Resource Strain: Not on file   Food Insecurity: Not on file   Transportation Needs: Not on file   Physical Activity: Not on file   Stress: Not on file   Social Connections: Not on file   Intimate Partner Violence: Not on file   Housing Stability: Not on file     Tobacco History:  reports that she quit smoking about 5 years ago. Her smoking use included cigarettes. She has a 0.13 pack-year smoking history. She has never used smokeless tobacco.  Alcohol Abuse:  reports that she does not currently use alcohol. Drug Abuse:  reports that she does not currently use drugs.     Patient Active Problem List   Diagnosis Code    Environmental allergies Z91.09    ACL (anterior cruciate ligament) rupture S83.519A    Supervision of other normal pregnancy Z34.80    COVID-19 affecting pregnancy in third trimester O98.513, U07.1    Preeclampsia O14.90    Encounter for elective induction of labor Z34.90       Review of Systems - History obtained from the patient  Constitutional: negative for weight loss, fever, night sweats  HEENT: negative for hearing loss, earache, congestion, snoring, sorethroat  CV: negative for chest pain, palpitations, edema  Resp: negative for cough, shortness of breath, wheezing  GI: negative for change in bowel habits, abdominal pain, black or bloody stools  : negative for frequency, dysuria, hematuria, vaginal discharge  MSK: negative for back pain, joint pain, muscle pain  Breast: negative for breast lumps, nipple discharge, galactorrhea  Skin :negative for itching, rash, hives  Neuro: negative for dizziness, headache, confusion, weakness  Psych: negative for anxiety, depression, change in mood  Heme/lymph: negative for bleeding, bruising, pallor    Physical Exam    Visit Vitals  BP (!) 137/90   Wt 220 lb 6.4 oz (100 kg)   LMP 09/12/2022   BMI 34.52 kg/m²       Constitutional  Appearance: well-nourished, well developed, alert, in no acute distress    HENT  Head and Face: appears normal    Neck  Inspection/Palpation: normal appearance, no masses or tenderness  Lymph Nodes: no lymphadenopathy present  Thyroid: gland size normal, nontender, no nodules or masses present on palpation    Chest  Respiratory Effort: breathing normal  Auscultation: normal breath sounds    Cardiovascular  Heart: Auscultation: regular rate and rhythm without murmur    Breasts  Inspection of Breasts: breasts symmetrical, no skin changes, no discharge present, nipple appearance normal, no skin retraction present  Palpation of Breasts and Axillae: no masses present on palpation, no breast tenderness  Axillary Lymph Nodes: no lymphadenopathy present    Gastrointestinal  Abdominal Examination: abdomen non-tender to palpation, normal bowel sounds, no masses present  Liver and spleen: no hepatomegaly present, spleen not palpable  Hernias: no hernias identified    Genitourinary  External Genitalia: normal appearance for age, no discharge present, no tenderness present, no inflammatory lesions present, no masses present, no atrophy present  Vagina: normal vaginal vault without central or paravaginal defects, bleeding present, no inflammatory lesions present, no masses present  Bladder: non-tender to palpation  Urethra: appears normal  Cervix: normal   Uterus: normal size, shape and consistency  Adnexa: no adnexal tenderness present, no adnexal masses present  Perineum: perineum within normal limits, no evidence of trauma, no rashes or skin lesions present  Anus: anus within normal limits, no hemorrhoids present  Inguinal Lymph Nodes: no lymphadenopathy present    Skin  General Inspection: no rash, no lesions identified    Neurologic/Psychiatric  Mental Status:  Orientation: grossly oriented to person, place and time  Mood and Affect: mood normal, affect appropriate    .   Assessment:  Routine gynecologic examination  Her current medical status is satisfactory with no evidence of significant gynecologic issues. Metrorrhagia/PCB  Plan:  Counseled re: diet, exercise, healthy lifestyle  Return for yearly wellness visits  Pt counseled regarding co-testing for high risk HPV with pap - may be unsat due to bleeding  Try Provera x 10d  ECC done       ECC procedure  Using small curette the endocervix was sharply curetted and small amount of tissue sent for pathology. No complications.

## 2022-09-13 ENCOUNTER — OFFICE VISIT (OUTPATIENT)
Dept: OBGYN CLINIC | Age: 32
End: 2022-09-13
Payer: MEDICAID

## 2022-09-13 VITALS — SYSTOLIC BLOOD PRESSURE: 137 MMHG | BODY MASS INDEX: 34.52 KG/M2 | WEIGHT: 220.4 LBS | DIASTOLIC BLOOD PRESSURE: 90 MMHG

## 2022-09-13 DIAGNOSIS — Z01.419 ENCOUNTER FOR GYNECOLOGICAL EXAMINATION (GENERAL) (ROUTINE) WITHOUT ABNORMAL FINDINGS: Primary | ICD-10-CM

## 2022-09-13 DIAGNOSIS — N93.0 POSTCOITAL BLEEDING: ICD-10-CM

## 2022-09-13 PROCEDURE — 57505 ENDOCERVICAL CURETTAGE: CPT | Performed by: OBSTETRICS & GYNECOLOGY

## 2022-09-13 PROCEDURE — 99395 PREV VISIT EST AGE 18-39: CPT | Performed by: OBSTETRICS & GYNECOLOGY

## 2022-09-13 RX ORDER — MEDROXYPROGESTERONE ACETATE 10 MG/1
10 TABLET ORAL DAILY
Qty: 10 TABLET | Refills: 0 | Status: SHIPPED | OUTPATIENT
Start: 2022-09-13

## 2022-09-19 LAB
CPT DISCLAIMER: NORMAL
CYTOLOGIST CVX/VAG CYTO: NORMAL
CYTOLOGY CVX/VAG DOC CYTO: NORMAL
CYTOLOGY CVX/VAG DOC THIN PREP: NORMAL
CYTOLOGY HISTORY:: NORMAL
DIAGNOSIS SYNOPSIS:: NORMAL
DX ICD CODE: NORMAL
DX ICD CODE: NORMAL
HPV I/H RISK 4 DNA CVX QL PROBE+SIG AMP: NEGATIVE
Lab: NORMAL
Lab: NORMAL
OTHER STN SPEC: NORMAL
PATH REPORT.FINAL DX SPEC: NORMAL
PATH REPORT.GROSS SPEC: NORMAL
PATH REPORT.RELEVANT HX SPEC: NORMAL
PATH REPORT.SITE OF ORIGIN SPEC: NORMAL
PATHOLOGIST NAME: NORMAL
PAYMENT PROCEDURE: NORMAL
STAT OF ADQ CVX/VAG CYTO-IMP: NORMAL

## 2023-05-30 NOTE — PROGRESS NOTES
Phil Barry is a 28 y.o. female presents for a problem visit. Chief Complaint   Patient presents with    Vaginal Bleeding     Patient's last menstrual period was 05/31/2023. Birth Control: vasectomy. Last Pap: 9/13/2022 normal/HPV neg    The patient is reporting having: irregular cycles.  Patient states she gets more than one cycle a month, very heavy, painful, bleeding after sex, weight gain, fatigue, always cold          Examination chaperoned by Grace Shah MA.

## 2023-06-01 ENCOUNTER — OFFICE VISIT (OUTPATIENT)
Age: 33
End: 2023-06-01
Payer: MEDICAID

## 2023-06-01 VITALS — WEIGHT: 209.2 LBS | DIASTOLIC BLOOD PRESSURE: 82 MMHG | BODY MASS INDEX: 32.77 KG/M2 | SYSTOLIC BLOOD PRESSURE: 119 MMHG

## 2023-06-01 DIAGNOSIS — N93.9 ABNORMAL UTERINE BLEEDING (AUB): Primary | ICD-10-CM

## 2023-06-01 LAB — HEMOGLOBIN, POC: 14.5 G/DL

## 2023-06-01 PROCEDURE — 99214 OFFICE O/P EST MOD 30 MIN: CPT | Performed by: OBSTETRICS & GYNECOLOGY

## 2023-06-01 PROCEDURE — 85018 HEMOGLOBIN: CPT | Performed by: OBSTETRICS & GYNECOLOGY

## 2023-06-01 RX ORDER — MEDROXYPROGESTERONE ACETATE 10 MG/1
10 TABLET ORAL DAILY
Qty: 10 TABLET | Refills: 0 | Status: SHIPPED | OUTPATIENT
Start: 2023-06-01

## 2023-06-01 NOTE — PROGRESS NOTES
OB/GYN Problem VIsit    SELAM Shaw is a No obstetric history on file. ,  28 y.o. female who presents for a problem visit. Took provera last fall for PCB. Worked great not issues until early this year bleeding erratic. No pattern. Bleeds about every 1-2 weeks. Partner has vasectomy - checked and negative. Also has weight gain, tired, not feeling well. Past Medical History:   Diagnosis Date    Anemia     Chlamydia 2014    DOMENICO II (cervical intraepithelial neoplasia II)     Dysplasia of cervix, low grade (DOMENICO 1)     Encounter for insertion of mirena IUD 2018    Mirena placed    Encounter for IUD removal 2020    Essential hypertension     Gestational hypertension     Headache      Past Surgical History:   Procedure Laterality Date    ADENOIDECTOMY      ANTERIOR CRUCIATE LIGAMENT REPAIR Left 2008    ANTERIOR CRUCIATE LIGAMENT REPAIR      CHOLECYSTECTOMY, LAPAROSCOPIC  10/04/2016    Laparoscopic cholecystectomy by Dr. Regina Miguel.     COLPOSCOPY      4/28/10- ecto DOMENICO 2----12 DOMENICO 1---13-negative    GYN      IUD    OTHER SURGICAL HISTORY      TONSILLECTOMY       Social History     Occupational History    Not on file   Tobacco Use    Smoking status: Former     Packs/day: 0.25     Types: Cigarettes     Quit date: 2017     Years since quittin.4    Smokeless tobacco: Never   Substance and Sexual Activity    Alcohol use: Not Currently    Drug use: Not Currently    Sexual activity: Yes     Partners: Male     Birth control/protection: Surgical     Comment: vasectomy     Family History   Adopted: Yes   Problem Relation Age of Onset    No Known Problems Mother     Anesth Problems Neg Hx     Gall Bladder Disease Maternal Grandmother     Hypertension Maternal Grandmother     Diabetes Maternal Uncle     No Known Problems Father        Allergies   Allergen Reactions    Desogestrel-Ethinyl Estradiol Hives    Medroxyprogesterone Hives    Sulfa Antibiotics Hives     As child    Penicillins

## 2023-06-02 LAB
HCG INTACT+B SERPL-ACNC: <1 MIU/ML
PROLACTIN SERPL-MCNC: 6.3 NG/ML (ref 4.8–23.3)
TSH SERPL DL<=0.005 MIU/L-ACNC: 1.89 UIU/ML (ref 0.45–4.5)

## 2023-06-16 NOTE — TELEPHONE ENCOUNTER
----- Message from Molly Reed sent at 6/2/2022  8:20 AM EDT -----  Subject: Appointment Request    Reason for Call: Urgent (Patient Request) ED Follow Up Visit    QUESTIONS  Type of Appointment? Established Patient  Reason for appointment request? Available appointments did not meet   patient need  Additional Information for Provider? Patient was seen in the ED for   dehydration and GI issues. She was told to see PCP as soon as possible for   follow up. She is wanting an appt for today or tomorrow. No answer at the   practice. ---------------------------------------------------------------------------  --------------  [facility]  What is the best way for the office to contact you? OK to leave message on   voicemail  Preferred Call Back Phone Number? [phone]  ---------------------------------------------------------------------------  --------------  SCRIPT ANSWERS  Relationship to Patient? Self  (Patient requests to see provider urgently. )? Yes  Have you been diagnosed with, awaiting test results for, or told that you   are suspected of having COVID-19 (Coronavirus)? (If patient has tested   negative or was tested as a requirement for work, school, or travel and   not based on symptoms, answer no)? No  Within the past 10 days have you developed any of the following symptoms   (answer no if symptoms have been present longer than 10 days or began   more than 10 days ago)? Fever or Chills, Cough, Shortness of breath or   difficulty breathing, Loss of taste or smell, Sore throat, Nasal   congestion, Sneezing or runny nose, Fatigue or generalized body aches   (answer no if pain is specific to a body part e.g. back pain), Diarrhea,   Headache? No  Have you had close contact with someone with COVID-19 in the last 7 days? No  (Service Expert  click yes below to proceed with [email or web address] As Usual   Scheduling)?  Yes Bandar Hidalgo is a 66 year old male presenting for   Chief Complaint   Patient presents with   • New Patient   • Medicare Wellness Visit     Denies Latex allergy or sensitivity.    Medication verified and med list updated  Refills needed today: Yes    Health Maintenance Due   Topic Date Due   • COVID-19 Vaccine (1) Never done   • Pneumococcal Vaccine 65+ (1 - PCV) Never done   • Shingles Vaccine (1 of 2) Never done   • Diabetes Eye Exam  05/03/2022   • Abdominal Aortic Aneurysm (AAA) Screening  Never done   • Traditional Medicare- Medicare Wellness Visit  Never done   • Diabetes Foot Exam  01/20/2023       Patient is due for topics as listed above but is not proceeding with Immunization(s) COVID-19, Pneumococcal and Shingles, Diabetes Eye Exam and Diabetes Foot Exam at this time.           Last lab results:   Hemoglobin A1C (%)   Date Value   04/06/2023 5.8 (H)     Cholesterol (mg/dL)   Date Value   04/06/2023 221 (H)     HDL (mg/dL)   Date Value   04/06/2023 57     Triglycerides (mg/dL)   Date Value   04/06/2023 95     LDL (mg/dL)   Date Value   04/06/2023 145 (H)     MICROALBUMIN, UA (TTL) (mg/dL)   Date Value   07/23/2019 0.70     Creatinine, Urine (mg/dL)   Date Value   04/06/2023 76.30     Microalbumin/ Creatinine Ratio (mg/g)   Date Value   04/06/2023 35.5 (H)     Lab Results   Component Value Date/Time    IFOB Negative 04/06/2023 09:21 AM    IFOB NEGATIVE 07/14/2017 02:38 PM                  Depression Screening:  Recent Review Flowsheet Data     Date 6/4/2023    Adult PHQ 2 Score 0    Adult PHQ 2 Interpretation No further screening needed    Little interest or pleasure in activity? Not at all    Feeling down, depressed or hopeless? Not at all           Advance Directives:  on file

## 2023-07-25 ENCOUNTER — OFFICE VISIT (OUTPATIENT)
Age: 33
End: 2023-07-25
Payer: MEDICAID

## 2023-07-25 VITALS
HEART RATE: 81 BPM | RESPIRATION RATE: 20 BRPM | WEIGHT: 225 LBS | OXYGEN SATURATION: 100 % | BODY MASS INDEX: 35.31 KG/M2 | TEMPERATURE: 98 F | SYSTOLIC BLOOD PRESSURE: 116 MMHG | HEIGHT: 67 IN | DIASTOLIC BLOOD PRESSURE: 75 MMHG

## 2023-07-25 DIAGNOSIS — E55.9 VITAMIN D DEFICIENCY: ICD-10-CM

## 2023-07-25 DIAGNOSIS — R73.03 BORDERLINE TYPE 2 DIABETES MELLITUS: ICD-10-CM

## 2023-07-25 DIAGNOSIS — E78.5 DYSLIPIDEMIA (HIGH LDL; LOW HDL): ICD-10-CM

## 2023-07-25 DIAGNOSIS — H66.002 ACUTE SUPPURATIVE OTITIS MEDIA OF LEFT EAR WITHOUT SPONTANEOUS RUPTURE OF TYMPANIC MEMBRANE, RECURRENCE NOT SPECIFIED: ICD-10-CM

## 2023-07-25 DIAGNOSIS — R58 ECCHYMOSIS: Primary | ICD-10-CM

## 2023-07-25 DIAGNOSIS — H83.2X3: ICD-10-CM

## 2023-07-25 DIAGNOSIS — E53.8 B12 DEFICIENCY: ICD-10-CM

## 2023-07-25 PROCEDURE — 99214 OFFICE O/P EST MOD 30 MIN: CPT | Performed by: INTERNAL MEDICINE

## 2023-07-25 RX ORDER — CEFUROXIME AXETIL 250 MG/1
250 TABLET ORAL 2 TIMES DAILY
Qty: 14 TABLET | Refills: 0 | Status: SHIPPED | OUTPATIENT
Start: 2023-07-25 | End: 2023-08-01

## 2023-07-25 RX ORDER — ISOPROPYL ALCOHOL 950 MG/ML
LIQUID AURICULAR (OTIC)
Qty: 29.57 ML | Refills: 0 | Status: SHIPPED | OUTPATIENT
Start: 2023-07-25

## 2023-07-25 SDOH — ECONOMIC STABILITY: INCOME INSECURITY: HOW HARD IS IT FOR YOU TO PAY FOR THE VERY BASICS LIKE FOOD, HOUSING, MEDICAL CARE, AND HEATING?: NOT HARD AT ALL

## 2023-07-25 SDOH — ECONOMIC STABILITY: FOOD INSECURITY: WITHIN THE PAST 12 MONTHS, YOU WORRIED THAT YOUR FOOD WOULD RUN OUT BEFORE YOU GOT MONEY TO BUY MORE.: NEVER TRUE

## 2023-07-25 SDOH — ECONOMIC STABILITY: FOOD INSECURITY: WITHIN THE PAST 12 MONTHS, THE FOOD YOU BOUGHT JUST DIDN'T LAST AND YOU DIDN'T HAVE MONEY TO GET MORE.: NEVER TRUE

## 2023-07-25 SDOH — ECONOMIC STABILITY: HOUSING INSECURITY
IN THE LAST 12 MONTHS, WAS THERE A TIME WHEN YOU DID NOT HAVE A STEADY PLACE TO SLEEP OR SLEPT IN A SHELTER (INCLUDING NOW)?: NO

## 2023-07-25 ASSESSMENT — PATIENT HEALTH QUESTIONNAIRE - PHQ9
SUM OF ALL RESPONSES TO PHQ QUESTIONS 1-9: 0
SUM OF ALL RESPONSES TO PHQ QUESTIONS 1-9: 0
SUM OF ALL RESPONSES TO PHQ9 QUESTIONS 1 & 2: 0
1. LITTLE INTEREST OR PLEASURE IN DOING THINGS: 0
SUM OF ALL RESPONSES TO PHQ QUESTIONS 1-9: 0
SUM OF ALL RESPONSES TO PHQ QUESTIONS 1-9: 0
2. FEELING DOWN, DEPRESSED OR HOPELESS: 0

## 2023-07-25 ASSESSMENT — ANXIETY QUESTIONNAIRES
7. FEELING AFRAID AS IF SOMETHING AWFUL MIGHT HAPPEN: 0
2. NOT BEING ABLE TO STOP OR CONTROL WORRYING: 0
3. WORRYING TOO MUCH ABOUT DIFFERENT THINGS: 0
1. FEELING NERVOUS, ANXIOUS, OR ON EDGE: 0
IF YOU CHECKED OFF ANY PROBLEMS ON THIS QUESTIONNAIRE, HOW DIFFICULT HAVE THESE PROBLEMS MADE IT FOR YOU TO DO YOUR WORK, TAKE CARE OF THINGS AT HOME, OR GET ALONG WITH OTHER PEOPLE: NOT DIFFICULT AT ALL
6. BECOMING EASILY ANNOYED OR IRRITABLE: 0
4. TROUBLE RELAXING: 0
5. BEING SO RESTLESS THAT IT IS HARD TO SIT STILL: 0
GAD7 TOTAL SCORE: 0

## 2023-07-25 NOTE — PROGRESS NOTES
Chief Complaint   Patient presents with    Follow-up     Blotches on legs no healing      HPI:  Ashwini Christopher is a 28 y.o. AA female presents for overdue follow-up. Patient is concern for blotches on legs the are not healing as quickly. She is not on aspirin, NSAIDs. Review of Systems  As per hpi    Past Medical History:   Diagnosis Date    Anemia     Chlamydia 2014    DOMENICO II (cervical intraepithelial neoplasia II)     Dysplasia of cervix, low grade (DOMENICO 1)     Encounter for insertion of mirena IUD 2018    Mirena placed    Encounter for IUD removal 2020    Essential hypertension     Gestational hypertension     Headache      Past Surgical History:   Procedure Laterality Date    ADENOIDECTOMY      ANTERIOR CRUCIATE LIGAMENT REPAIR Left 2008    ANTERIOR CRUCIATE LIGAMENT REPAIR  2019    CHOLECYSTECTOMY, LAPAROSCOPIC  10/04/2016    Laparoscopic cholecystectomy by Dr. Onofre King.     COLPOSCOPY      4/28/10- ecto DOMENICO 2----12 DOMENICO 1---13-negative    GYN      IUD    OTHER SURGICAL HISTORY      TONSILLECTOMY  2014     Social History     Socioeconomic History    Marital status: Single     Spouse name: None    Number of children: None    Years of education: None    Highest education level: None   Tobacco Use    Smoking status: Former     Packs/day: 0.25     Types: Cigarettes     Quit date: 2017     Years since quittin.5    Smokeless tobacco: Never   Substance and Sexual Activity    Alcohol use: Not Currently    Drug use: Not Currently    Sexual activity: Yes     Partners: Male     Birth control/protection: Surgical     Comment: vasectomy     Social Determinants of Health     Financial Resource Strain: Low Risk     Difficulty of Paying Living Expenses: Not hard at all   Food Insecurity: No Food Insecurity    Worried About Running Out of Food in the Last Year: Never true    Ran Out of Food in the Last Year: Never true   Transportation Needs: Unknown    Lack of Transportation

## 2023-07-26 LAB
25(OH)D3+25(OH)D2 SERPL-MCNC: 24.7 NG/ML (ref 30–100)
ALBUMIN SERPL-MCNC: 4.5 G/DL (ref 3.9–4.9)
ALBUMIN/GLOB SERPL: 2 {RATIO} (ref 1.2–2.2)
ALP SERPL-CCNC: 82 IU/L (ref 44–121)
ALT SERPL-CCNC: 12 IU/L (ref 0–32)
AST SERPL-CCNC: 16 IU/L (ref 0–40)
BASOPHILS # BLD AUTO: 0.1 X10E3/UL (ref 0–0.2)
BASOPHILS NFR BLD AUTO: 1 %
BILIRUB SERPL-MCNC: 0.5 MG/DL (ref 0–1.2)
BUN SERPL-MCNC: 6 MG/DL (ref 6–20)
BUN/CREAT SERPL: 7 (ref 9–23)
CALCIUM SERPL-MCNC: 9.1 MG/DL (ref 8.7–10.2)
CHLORIDE SERPL-SCNC: 103 MMOL/L (ref 96–106)
CHOLEST SERPL-MCNC: 187 MG/DL (ref 100–199)
CO2 SERPL-SCNC: 24 MMOL/L (ref 20–29)
CREAT SERPL-MCNC: 0.85 MG/DL (ref 0.57–1)
EGFRCR SERPLBLD CKD-EPI 2021: 93 ML/MIN/1.73
EOSINOPHIL # BLD AUTO: 0.1 X10E3/UL (ref 0–0.4)
EOSINOPHIL NFR BLD AUTO: 1 %
ERYTHROCYTE [DISTWIDTH] IN BLOOD BY AUTOMATED COUNT: 12.1 % (ref 11.7–15.4)
FOLATE SERPL-MCNC: 4.1 NG/ML
GLOBULIN SER CALC-MCNC: 2.2 G/DL (ref 1.5–4.5)
GLUCOSE SERPL-MCNC: 84 MG/DL (ref 70–99)
HBA1C MFR BLD: 5.1 % (ref 4.8–5.6)
HCT VFR BLD AUTO: 42.5 % (ref 34–46.6)
HDLC SERPL-MCNC: 52 MG/DL
HGB BLD-MCNC: 14.1 G/DL (ref 11.1–15.9)
IMM GRANULOCYTES # BLD AUTO: 0 X10E3/UL (ref 0–0.1)
IMM GRANULOCYTES NFR BLD AUTO: 0 %
LDLC SERPL CALC-MCNC: 123 MG/DL (ref 0–99)
LYMPHOCYTES # BLD AUTO: 1.8 X10E3/UL (ref 0.7–3.1)
LYMPHOCYTES NFR BLD AUTO: 24 %
MCH RBC QN AUTO: 31.3 PG (ref 26.6–33)
MCHC RBC AUTO-ENTMCNC: 33.2 G/DL (ref 31.5–35.7)
MCV RBC AUTO: 94 FL (ref 79–97)
MONOCYTES # BLD AUTO: 0.4 X10E3/UL (ref 0.1–0.9)
MONOCYTES NFR BLD AUTO: 5 %
NEUTROPHILS # BLD AUTO: 5.3 X10E3/UL (ref 1.4–7)
NEUTROPHILS NFR BLD AUTO: 69 %
PLATELET # BLD AUTO: 314 X10E3/UL (ref 150–450)
POTASSIUM SERPL-SCNC: 4.3 MMOL/L (ref 3.5–5.2)
PROT SERPL-MCNC: 6.7 G/DL (ref 6–8.5)
RBC # BLD AUTO: 4.51 X10E6/UL (ref 3.77–5.28)
SODIUM SERPL-SCNC: 142 MMOL/L (ref 134–144)
TRIGL SERPL-MCNC: 64 MG/DL (ref 0–149)
VIT B12 SERPL-MCNC: 194 PG/ML (ref 232–1245)
VLDLC SERPL CALC-MCNC: 12 MG/DL (ref 5–40)
WBC # BLD AUTO: 7.6 X10E3/UL (ref 3.4–10.8)

## 2023-08-01 DIAGNOSIS — R58 ECCHYMOSIS: ICD-10-CM

## 2023-08-01 DIAGNOSIS — E78.5 DYSLIPIDEMIA (HIGH LDL; LOW HDL): ICD-10-CM

## 2023-08-01 DIAGNOSIS — R73.03 BORDERLINE TYPE 2 DIABETES MELLITUS: ICD-10-CM

## 2023-08-01 DIAGNOSIS — E53.8 B12 DEFICIENCY: ICD-10-CM

## 2023-08-01 DIAGNOSIS — H66.002 ACUTE SUPPURATIVE OTITIS MEDIA OF LEFT EAR WITHOUT SPONTANEOUS RUPTURE OF TYMPANIC MEMBRANE, RECURRENCE NOT SPECIFIED: ICD-10-CM

## 2023-08-01 DIAGNOSIS — E55.9 VITAMIN D DEFICIENCY: ICD-10-CM

## 2023-08-01 DIAGNOSIS — H83.2X3: ICD-10-CM

## 2023-08-15 ENCOUNTER — OFFICE VISIT (OUTPATIENT)
Age: 33
End: 2023-08-15
Payer: MEDICAID

## 2023-08-15 VITALS
TEMPERATURE: 98.7 F | SYSTOLIC BLOOD PRESSURE: 111 MMHG | HEIGHT: 67 IN | HEART RATE: 80 BPM | RESPIRATION RATE: 20 BRPM | OXYGEN SATURATION: 100 % | BODY MASS INDEX: 32.18 KG/M2 | WEIGHT: 205 LBS | DIASTOLIC BLOOD PRESSURE: 73 MMHG

## 2023-08-15 DIAGNOSIS — E78.5 DYSLIPIDEMIA (HIGH LDL; LOW HDL): ICD-10-CM

## 2023-08-15 DIAGNOSIS — E53.8 B12 DEFICIENCY: ICD-10-CM

## 2023-08-15 DIAGNOSIS — E55.9 VITAMIN D DEFICIENCY, UNSPECIFIED: Primary | ICD-10-CM

## 2023-08-15 PROCEDURE — 99214 OFFICE O/P EST MOD 30 MIN: CPT | Performed by: INTERNAL MEDICINE

## 2023-08-15 NOTE — PROGRESS NOTES
Chief Complaint   Patient presents with    Results     HPI:  Hai Davidson is a 35 y.o. AA female presents for Results review. Serum vit D is slight low. Advised to continue supplements. B12 level is below normal. B12 supplement has been ordered. LDL is slightly above normal. Diet and exercise are encouraged. Review of Systems  As per hpi    Past Medical History:   Diagnosis Date    Anemia     Chlamydia 2014    DOMENICO II (cervical intraepithelial neoplasia II)     Dysplasia of cervix, low grade (DOMENICO 1)     Encounter for insertion of mirena IUD 2018    Mirena placed    Encounter for IUD removal 2020    Essential hypertension     Gestational hypertension     Headache      Past Surgical History:   Procedure Laterality Date    ADENOIDECTOMY      ANTERIOR CRUCIATE LIGAMENT REPAIR Left 2008    ANTERIOR CRUCIATE LIGAMENT REPAIR  2019    CHOLECYSTECTOMY, LAPAROSCOPIC  10/04/2016    Laparoscopic cholecystectomy by Dr. Bo Ford.     COLPOSCOPY      4/28/10- ecto DOMENICO 2----12 DOMENICO 1---13-negative    GYN      IUD    OTHER SURGICAL HISTORY      TONSILLECTOMY       Social History     Socioeconomic History    Marital status: Single   Tobacco Use    Smoking status: Former     Packs/day: 0.25     Types: Cigarettes     Quit date: 2017     Years since quittin.6    Smokeless tobacco: Never   Substance and Sexual Activity    Alcohol use: Not Currently    Drug use: Not Currently    Sexual activity: Yes     Partners: Male     Birth control/protection: Surgical     Comment: vasectomy     Social Determinants of Health     Financial Resource Strain: Low Risk     Difficulty of Paying Living Expenses: Not hard at all   Food Insecurity: No Food Insecurity    Worried About [place] in the Last Year: Never true    Ran Out of Food in the Last Year: Never true   Transportation Needs: Unknown    Lack of Transportation (Non-Medical): No   Housing Stability: Unknown    Unstable Housing in the Last

## 2023-10-18 ENCOUNTER — OFFICE VISIT (OUTPATIENT)
Age: 33
End: 2023-10-18

## 2023-10-18 VITALS
DIASTOLIC BLOOD PRESSURE: 80 MMHG | TEMPERATURE: 97 F | BODY MASS INDEX: 30.83 KG/M2 | RESPIRATION RATE: 18 BRPM | WEIGHT: 203.4 LBS | SYSTOLIC BLOOD PRESSURE: 128 MMHG | HEART RATE: 100 BPM | OXYGEN SATURATION: 100 % | HEIGHT: 68 IN

## 2023-10-18 DIAGNOSIS — S83.281S TEAR OF LATERAL MENISCUS OF RIGHT KNEE, UNSPECIFIED TEAR TYPE, UNSPECIFIED WHETHER OLD OR CURRENT TEAR, SEQUELA: Primary | ICD-10-CM

## 2023-10-18 ASSESSMENT — PATIENT HEALTH QUESTIONNAIRE - PHQ9
1. LITTLE INTEREST OR PLEASURE IN DOING THINGS: 0
SUM OF ALL RESPONSES TO PHQ9 QUESTIONS 1 & 2: 0
SUM OF ALL RESPONSES TO PHQ QUESTIONS 1-9: 0
SUM OF ALL RESPONSES TO PHQ QUESTIONS 1-9: 0
2. FEELING DOWN, DEPRESSED OR HOPELESS: 0
SUM OF ALL RESPONSES TO PHQ QUESTIONS 1-9: 0
SUM OF ALL RESPONSES TO PHQ QUESTIONS 1-9: 0

## 2023-12-19 ENCOUNTER — TELEPHONE (OUTPATIENT)
Age: 33
End: 2023-12-19

## 2023-12-28 ENCOUNTER — APPOINTMENT (OUTPATIENT)
Facility: HOSPITAL | Age: 33
End: 2023-12-28
Payer: MEDICAID

## 2023-12-28 ENCOUNTER — HOSPITAL ENCOUNTER (EMERGENCY)
Facility: HOSPITAL | Age: 33
Discharge: HOME | End: 2023-12-28
Attending: STUDENT IN AN ORGANIZED HEALTH CARE EDUCATION/TRAINING PROGRAM
Payer: MEDICAID

## 2023-12-28 VITALS
HEART RATE: 84 BPM | BODY MASS INDEX: 28.47 KG/M2 | RESPIRATION RATE: 16 BRPM | SYSTOLIC BLOOD PRESSURE: 121 MMHG | TEMPERATURE: 98.8 F | WEIGHT: 187.83 LBS | HEIGHT: 68 IN | OXYGEN SATURATION: 100 % | DIASTOLIC BLOOD PRESSURE: 74 MMHG

## 2023-12-28 DIAGNOSIS — J11.1 INFLUENZA: Primary | ICD-10-CM

## 2023-12-28 DIAGNOSIS — R19.7 DIARRHEA, UNSPECIFIED TYPE: ICD-10-CM

## 2023-12-28 DIAGNOSIS — R05.1 ACUTE COUGH: ICD-10-CM

## 2023-12-28 LAB
ALBUMIN SERPL-MCNC: 3.6 G/DL (ref 3.5–5)
ALBUMIN/GLOB SERPL: 1.1 (ref 1.1–2.2)
ALP SERPL-CCNC: 60 U/L (ref 45–117)
ALT SERPL-CCNC: 23 U/L (ref 12–78)
ANION GAP SERPL CALC-SCNC: 11 MMOL/L (ref 5–15)
AST SERPL-CCNC: 12 U/L (ref 15–37)
BASOPHILS # BLD: 0 K/UL (ref 0–0.1)
BASOPHILS NFR BLD: 0 % (ref 0–1)
BILIRUB SERPL-MCNC: 0.6 MG/DL (ref 0.2–1)
BUN SERPL-MCNC: 4 MG/DL (ref 6–20)
BUN/CREAT SERPL: 5 (ref 12–20)
CALCIUM SERPL-MCNC: 8.7 MG/DL (ref 8.5–10.1)
CHLORIDE SERPL-SCNC: 105 MMOL/L (ref 97–108)
CO2 SERPL-SCNC: 26 MMOL/L (ref 21–32)
CREAT SERPL-MCNC: 0.84 MG/DL (ref 0.55–1.02)
DIFFERENTIAL METHOD BLD: ABNORMAL
EOSINOPHIL # BLD: 0.1 K/UL (ref 0–0.4)
EOSINOPHIL NFR BLD: 2 % (ref 0–7)
ERYTHROCYTE [DISTWIDTH] IN BLOOD BY AUTOMATED COUNT: 12.3 % (ref 11.5–14.5)
GLOBULIN SER CALC-MCNC: 3.4 G/DL (ref 2–4)
GLUCOSE SERPL-MCNC: 87 MG/DL (ref 65–100)
HCT VFR BLD AUTO: 40 % (ref 35–47)
HGB BLD-MCNC: 13.3 G/DL (ref 11.5–16)
IMM GRANULOCYTES # BLD AUTO: 0 K/UL (ref 0–0.04)
IMM GRANULOCYTES NFR BLD AUTO: 1 % (ref 0–0.5)
LYMPHOCYTES # BLD: 1.9 K/UL (ref 0.8–3.5)
LYMPHOCYTES NFR BLD: 23 % (ref 12–49)
MAGNESIUM SERPL-MCNC: 2 MG/DL (ref 1.6–2.4)
MCH RBC QN AUTO: 30.6 PG (ref 26–34)
MCHC RBC AUTO-ENTMCNC: 33.3 G/DL (ref 30–36.5)
MCV RBC AUTO: 92.2 FL (ref 80–99)
MONOCYTES # BLD: 0.6 K/UL (ref 0–1)
MONOCYTES NFR BLD: 7 % (ref 5–13)
NEUTS SEG # BLD: 5.4 K/UL (ref 1.8–8)
NEUTS SEG NFR BLD: 67 % (ref 32–75)
NRBC # BLD: 0 K/UL (ref 0–0.01)
NRBC BLD-RTO: 0 PER 100 WBC
PLATELET # BLD AUTO: 352 K/UL (ref 150–400)
PMV BLD AUTO: 9.4 FL (ref 8.9–12.9)
POTASSIUM SERPL-SCNC: 3.8 MMOL/L (ref 3.5–5.1)
PROT SERPL-MCNC: 7 G/DL (ref 6.4–8.2)
RBC # BLD AUTO: 4.34 M/UL (ref 3.8–5.2)
SODIUM SERPL-SCNC: 142 MMOL/L (ref 136–145)
WBC # BLD AUTO: 8 K/UL (ref 3.6–11)

## 2023-12-28 PROCEDURE — 83735 ASSAY OF MAGNESIUM: CPT

## 2023-12-28 PROCEDURE — 99284 EMERGENCY DEPT VISIT MOD MDM: CPT

## 2023-12-28 PROCEDURE — 85025 COMPLETE CBC W/AUTO DIFF WBC: CPT

## 2023-12-28 PROCEDURE — 96360 HYDRATION IV INFUSION INIT: CPT

## 2023-12-28 PROCEDURE — 71046 X-RAY EXAM CHEST 2 VIEWS: CPT

## 2023-12-28 PROCEDURE — 36415 COLL VENOUS BLD VENIPUNCTURE: CPT

## 2023-12-28 PROCEDURE — 80053 COMPREHEN METABOLIC PANEL: CPT

## 2023-12-28 PROCEDURE — 2580000003 HC RX 258: Performed by: FAMILY MEDICINE

## 2023-12-28 RX ORDER — 0.9 % SODIUM CHLORIDE 0.9 %
1000 INTRAVENOUS SOLUTION INTRAVENOUS ONCE
Status: COMPLETED | OUTPATIENT
Start: 2023-12-28 | End: 2023-12-28

## 2023-12-28 RX ORDER — BENZONATATE 100 MG/1
100-200 CAPSULE ORAL 3 TIMES DAILY PRN
Qty: 60 CAPSULE | Refills: 0 | Status: SHIPPED | OUTPATIENT
Start: 2023-12-28 | End: 2024-01-04

## 2023-12-28 RX ADMIN — SODIUM CHLORIDE 1000 ML: 9 INJECTION, SOLUTION INTRAVENOUS at 13:20

## 2023-12-28 NOTE — ED TRIAGE NOTES
Pt reports she tested positive for flu A on the 16th of December at [facility] ED. Pt reports her fever has gone away and cam back last night and she is having diarrhea 3-4 times a day for the past 2 days and her cough is not getting any better.

## 2023-12-28 NOTE — DISCHARGE INSTRUCTIONS
Thank you for allowing us to provide you with medical care today. We realize that you have many choices for your emergency care needs. We thank you for choosing [facility]. Please choose us in the future for any continued health care needs. The exam and treatment you received in the emergency department were for an emergent problem and are not intended as complete care. It is important that you follow-up with a doctor. If your symptoms worsen or you do not improve you should return to the emergency department. We are available 24 hours a day. Please make an appointment with your health care provider for follow-up of your emergency department visit. Take this sheet with you when you go to your follow-up visit.

## 2024-01-23 ENCOUNTER — OFFICE VISIT (OUTPATIENT)
Age: 34
End: 2024-01-23
Payer: MEDICAID

## 2024-01-23 VITALS — SYSTOLIC BLOOD PRESSURE: 129 MMHG | DIASTOLIC BLOOD PRESSURE: 81 MMHG | WEIGHT: 189.2 LBS | BODY MASS INDEX: 28.77 KG/M2

## 2024-01-23 DIAGNOSIS — N93.9 ABNORMAL UTERINE BLEEDING (AUB): Primary | ICD-10-CM

## 2024-01-23 LAB
HCG, PREGNANCY, URINE, POC: NEGATIVE
HEMOGLOBIN, POC: 13.2 G/DL
VALID INTERNAL CONTROL, POC: YES

## 2024-01-23 PROCEDURE — 99213 OFFICE O/P EST LOW 20 MIN: CPT | Performed by: OBSTETRICS & GYNECOLOGY

## 2024-01-23 PROCEDURE — 85018 HEMOGLOBIN: CPT | Performed by: OBSTETRICS & GYNECOLOGY

## 2024-01-23 PROCEDURE — 81025 URINE PREGNANCY TEST: CPT | Performed by: OBSTETRICS & GYNECOLOGY

## 2024-01-23 RX ORDER — MEDROXYPROGESTERONE ACETATE 10 MG/1
10 TABLET ORAL DAILY
Qty: 10 TABLET | Refills: 0 | Status: SHIPPED | OUTPATIENT
Start: 2024-01-23

## 2024-01-23 ASSESSMENT — PATIENT HEALTH QUESTIONNAIRE - PHQ9
SUM OF ALL RESPONSES TO PHQ QUESTIONS 1-9: 0
1. LITTLE INTEREST OR PLEASURE IN DOING THINGS: 0
2. FEELING DOWN, DEPRESSED OR HOPELESS: 0
SUM OF ALL RESPONSES TO PHQ9 QUESTIONS 1 & 2: 0

## 2024-01-23 NOTE — PROGRESS NOTES
Sharon Leong is a 33 y.o. female presents for a problem visit.    Chief Complaint   Patient presents with    Vaginal Bleeding     Patient's last menstrual period was 12/26/2023.      The patient is reporting having:  AUB. Patient states she has bled heavy on and off since 12/26, stopped on 1/20. Has noticed bruising that will not go away recently       Examination chaperoned by Aga Johnson MA.  
present  Vagina: normal vaginal vault without central or paravaginal defects, no discharge present, no inflammatory lesions present, no masses present  Bladder: non-tender to palpation  Urethra: appears normal  Cervix: normal   Uterus: normal size, shape and consistency  Adnexa: no adnexal tenderness present, no adnexal masses present  Perineum: perineum within normal limits, no evidence of trauma, no rashes or skin lesions present  Anus: anus within normal limits, no hemorrhoids present  Inguinal Lymph Nodes: no lymphadenopathy present    Skin  General Inspection: no rash, no lesions identified    Neurologic/Psychiatric  Mental Status:  Orientation: grossly oriented to person, place and time  Mood and Affect: mood normal, affect appropriate  Results for orders placed or performed in visit on 01/23/24   AMB POC URINE PREGNANCY TEST, VISUAL COLOR COMPARISON   Result Value Ref Range    Valid Internal Control, POC yes     HCG, Pregnancy, Urine, POC Negative    AMB POC HEMOGLOBIN (HGB)   Result Value Ref Range    Hemoglobin, POC 13.2 G/DL         ASSESSMENT:    ICD-10-CM    1. Abnormal uterine bleeding (AUB)  N93.9 AMB POC URINE PREGNANCY TEST, VISUAL COLOR COMPARISON     AMB POC HEMOGLOBIN (HGB)          PLAN:  Orders Placed This Encounter    AMB POC URINE PREGNANCY TEST, VISUAL COLOR COMPARISON    AMB POC HEMOGLOBIN (HGB)    medroxyPROGESTERone (PROVERA) 10 MG tablet     Sig: Take 1 tablet by mouth daily     Dispense:  10 tablet     Refill:  0   Expect heavy withdrawal with Provera. Call to repeat if needed. Has had issues with Mirena/nexplanon/DP/some OCP so would prefer not to use.     RTO prn if symptoms persist or worsen.  Instructions given to pt.  Handouts given to pt.

## 2024-02-15 ENCOUNTER — OFFICE VISIT (OUTPATIENT)
Age: 34
End: 2024-02-15
Payer: MEDICAID

## 2024-02-15 VITALS
TEMPERATURE: 98 F | WEIGHT: 193 LBS | SYSTOLIC BLOOD PRESSURE: 129 MMHG | OXYGEN SATURATION: 100 % | RESPIRATION RATE: 20 BRPM | BODY MASS INDEX: 29.25 KG/M2 | HEIGHT: 68 IN | HEART RATE: 78 BPM | DIASTOLIC BLOOD PRESSURE: 70 MMHG

## 2024-02-15 DIAGNOSIS — E03.9 HYPOTHYROIDISM, UNSPECIFIED TYPE: ICD-10-CM

## 2024-02-15 DIAGNOSIS — R94.31 ABNORMAL EKG: ICD-10-CM

## 2024-02-15 DIAGNOSIS — R58 ECCHYMOSIS: ICD-10-CM

## 2024-02-15 DIAGNOSIS — R00.9 HEART BEAT ABNORMALITY: Primary | ICD-10-CM

## 2024-02-15 DIAGNOSIS — R00.2 PALPITATIONS: ICD-10-CM

## 2024-02-15 PROCEDURE — 99214 OFFICE O/P EST MOD 30 MIN: CPT | Performed by: INTERNAL MEDICINE

## 2024-02-15 PROCEDURE — 93010 ELECTROCARDIOGRAM REPORT: CPT | Performed by: INTERNAL MEDICINE

## 2024-02-15 PROCEDURE — 93005 ELECTROCARDIOGRAM TRACING: CPT | Performed by: INTERNAL MEDICINE

## 2024-02-15 NOTE — PROGRESS NOTES
Chief Complaint   Patient presents with    Follow-up    Breathing Problem    Palpitations     HPI:  Sharon Leong is a 33 y.o. AA female presents for follow-up.  Patient has c/o recurrent heart palpitations associated with fast breathing. Denies chest pain.   She was evaluated in the ER in a recent past. Patient wants to see a cardiologist. I agree with her request.  EKG in office today showed some abnormality.  Also, she has additional complaints of easy bruising of thigh/legs. She not on any blood thinner.  Platelets are within normal limits.    Review of Systems  As per hpi    Past Medical History:   Diagnosis Date    Anemia     Chlamydia 2014    DOMENICO II (cervical intraepithelial neoplasia II)     Dysplasia of cervix, low grade (DOMENICO 1)     Encounter for insertion of mirena IUD 2018    Mirena placed    Encounter for IUD removal 2020    Essential hypertension     Gestational hypertension     Headache      Past Surgical History:   Procedure Laterality Date    ADENOIDECTOMY      ANTERIOR CRUCIATE LIGAMENT REPAIR Left 2008    ANTERIOR CRUCIATE LIGAMENT REPAIR  2019    CHOLECYSTECTOMY, LAPAROSCOPIC  10/04/2016    Laparoscopic cholecystectomy by Dr. Avalos.    COLPOSCOPY      4/28/10- ecto DOMENICO 2----12 DOMENICO 1---13-negative    GYN      IUD    KNEE ARTHROSCOPY      KNEE SURGERY      OTHER SURGICAL HISTORY      TONSILLECTOMY  2014     Social History     Socioeconomic History    Marital status: Single     Spouse name: None    Number of children: None    Years of education: None    Highest education level: None   Tobacco Use    Smoking status: Former     Current packs/day: 0.00     Types: Cigarettes     Quit date: 2017     Years since quittin.1    Smokeless tobacco: Never   Vaping Use    Vaping Use: Never used   Substance and Sexual Activity    Alcohol use: Yes    Drug use: Not Currently    Sexual activity: Yes     Partners: Male     Birth control/protection: Surgical     Comment: vasectomy

## 2024-02-17 LAB
ALBUMIN SERPL-MCNC: 4.3 G/DL (ref 3.9–4.9)
ALBUMIN/GLOB SERPL: 2.2 {RATIO} (ref 1.2–2.2)
ALP SERPL-CCNC: 69 IU/L (ref 44–121)
ALT SERPL-CCNC: 7 IU/L (ref 0–32)
AST SERPL-CCNC: 14 IU/L (ref 0–40)
BASOPHILS # BLD AUTO: 0 X10E3/UL (ref 0–0.2)
BASOPHILS NFR BLD AUTO: 1 %
BILIRUB SERPL-MCNC: 0.5 MG/DL (ref 0–1.2)
BUN SERPL-MCNC: 8 MG/DL (ref 6–20)
BUN/CREAT SERPL: 9 (ref 9–23)
CALCIUM SERPL-MCNC: 9.3 MG/DL (ref 8.7–10.2)
CHLORIDE SERPL-SCNC: 105 MMOL/L (ref 96–106)
CO2 SERPL-SCNC: 24 MMOL/L (ref 20–29)
CREAT SERPL-MCNC: 0.85 MG/DL (ref 0.57–1)
EGFRCR SERPLBLD CKD-EPI 2021: 93 ML/MIN/1.73
EOSINOPHIL # BLD AUTO: 0.1 X10E3/UL (ref 0–0.4)
EOSINOPHIL NFR BLD AUTO: 1 %
ERYTHROCYTE [DISTWIDTH] IN BLOOD BY AUTOMATED COUNT: 12.2 % (ref 11.7–15.4)
GLOBULIN SER CALC-MCNC: 2 G/DL (ref 1.5–4.5)
GLUCOSE SERPL-MCNC: 93 MG/DL (ref 70–99)
HCT VFR BLD AUTO: 41.4 % (ref 34–46.6)
HGB BLD-MCNC: 13.8 G/DL (ref 11.1–15.9)
IMM GRANULOCYTES # BLD AUTO: 0 X10E3/UL (ref 0–0.1)
IMM GRANULOCYTES NFR BLD AUTO: 0 %
LYMPHOCYTES # BLD AUTO: 1.5 X10E3/UL (ref 0.7–3.1)
LYMPHOCYTES NFR BLD AUTO: 23 %
MCH RBC QN AUTO: 31.4 PG (ref 26.6–33)
MCHC RBC AUTO-ENTMCNC: 33.3 G/DL (ref 31.5–35.7)
MCV RBC AUTO: 94 FL (ref 79–97)
MONOCYTES # BLD AUTO: 0.3 X10E3/UL (ref 0.1–0.9)
MONOCYTES NFR BLD AUTO: 5 %
NEUTROPHILS # BLD AUTO: 4.5 X10E3/UL (ref 1.4–7)
NEUTROPHILS NFR BLD AUTO: 70 %
PLATELET # BLD AUTO: 295 X10E3/UL (ref 150–450)
POTASSIUM SERPL-SCNC: 4.5 MMOL/L (ref 3.5–5.2)
PROT SERPL-MCNC: 6.3 G/DL (ref 6–8.5)
RBC # BLD AUTO: 4.39 X10E6/UL (ref 3.77–5.28)
SODIUM SERPL-SCNC: 141 MMOL/L (ref 134–144)
TSH SERPL DL<=0.005 MIU/L-ACNC: 1.93 UIU/ML (ref 0.45–4.5)
WBC # BLD AUTO: 6.3 X10E3/UL (ref 3.4–10.8)

## 2024-02-22 DIAGNOSIS — R00.2 PALPITATIONS: ICD-10-CM

## 2024-02-22 DIAGNOSIS — E03.9 HYPOTHYROIDISM, UNSPECIFIED TYPE: ICD-10-CM

## 2024-02-22 DIAGNOSIS — R00.9 HEART BEAT ABNORMALITY: ICD-10-CM

## 2024-02-22 DIAGNOSIS — R58 ECCHYMOSIS: ICD-10-CM

## 2024-05-20 ENCOUNTER — OFFICE VISIT (OUTPATIENT)
Age: 34
End: 2024-05-20
Payer: MEDICAID

## 2024-05-20 VITALS
BODY MASS INDEX: 30.16 KG/M2 | RESPIRATION RATE: 20 BRPM | SYSTOLIC BLOOD PRESSURE: 119 MMHG | OXYGEN SATURATION: 100 % | TEMPERATURE: 97.8 F | WEIGHT: 199 LBS | HEIGHT: 68 IN | HEART RATE: 79 BPM | DIASTOLIC BLOOD PRESSURE: 85 MMHG

## 2024-05-20 DIAGNOSIS — E55.9 VITAMIN D DEFICIENCY, UNSPECIFIED: ICD-10-CM

## 2024-05-20 DIAGNOSIS — E53.8 B12 DEFICIENCY: ICD-10-CM

## 2024-05-20 PROCEDURE — 99214 OFFICE O/P EST MOD 30 MIN: CPT | Performed by: INTERNAL MEDICINE

## 2024-05-20 ASSESSMENT — PATIENT HEALTH QUESTIONNAIRE - PHQ9
SUM OF ALL RESPONSES TO PHQ QUESTIONS 1-9: 0
2. FEELING DOWN, DEPRESSED OR HOPELESS: NOT AT ALL
SUM OF ALL RESPONSES TO PHQ QUESTIONS 1-9: 0
SUM OF ALL RESPONSES TO PHQ9 QUESTIONS 1 & 2: 0
1. LITTLE INTEREST OR PLEASURE IN DOING THINGS: NOT AT ALL

## 2024-05-20 NOTE — PROGRESS NOTES
(Non-Medical): No   Housing Stability: Unknown (7/25/2023)    Housing Stability Vital Sign     Unstable Housing in the Last Year: No     Family History   Adopted: Yes   Problem Relation Age of Onset    No Known Problems Mother     Anesth Problems Neg Hx     Gall Bladder Disease Maternal Grandmother     Hypertension Maternal Grandmother     Diabetes Maternal Uncle     No Known Problems Father      Current Outpatient Medications   Medication Sig Dispense Refill    vitamin D (CHOLECALCIFEROL) 50 MCG (2000 UT) CAPS capsule Take 1 capsule by mouth daily 90 capsule 1    cyanocobalamin ([facility] VITAMIN B12) 1000 MCG tablet Take 1 tablet by mouth daily 30 tablet 3     No current facility-administered medications for this visit.     Allergies   Allergen Reactions    Desogestrel-Ethinyl Estradiol Hives    Sulfa Antibiotics Hives     As child    Penicillins Hives and Rash    Sulfamethoxazole-Trimethoprim Rash     Objective:  /85   Pulse 79   Temp 97.8 °F (36.6 °C) (Temporal)   Resp 20   Ht 1.727 m (5' 8\")   Wt 90.3 kg (199 lb)   LMP 05/13/2024   SpO2 100%   BMI 30.26 kg/m²   Physical Exam:   General appearance - alert, well appearing in no distress  Mental status - alert, oriented to person, place, and time  Neck - supple, no significant adenopathy   Chest - no wheezes, rales or rhonchi, symmetric air entry   Heart - normal S1, S2, no murmurs, rubs  Abdomen - soft, nontender, nondistended, no organomegaly  Ext-peripheral pulses normal, no pedal edema  Neuro -no focal findings     Assessment/Plan:  Sharon was seen today for 3 month follow-up.    Diagnoses and all orders for this visit:    B12 deficiency  -     cyanocobalamin ([facility] VITAMIN B12) 1000 MCG tablet; Take 1 tablet by mouth daily    Vitamin D deficiency, unspecified  -     vitamin D (CHOLECALCIFEROL) 50 MCG (2000 UT) CAPS capsule; Take 1 capsule by mouth daily        Return in about 4 months (around 9/20/2024) for routine follow up.

## 2024-11-15 ENCOUNTER — OFFICE VISIT (OUTPATIENT)
Age: 34
End: 2024-11-15

## 2024-11-15 VITALS
WEIGHT: 207 LBS | OXYGEN SATURATION: 100 % | HEART RATE: 91 BPM | TEMPERATURE: 98.3 F | SYSTOLIC BLOOD PRESSURE: 121 MMHG | DIASTOLIC BLOOD PRESSURE: 80 MMHG | BODY MASS INDEX: 31.47 KG/M2 | RESPIRATION RATE: 16 BRPM

## 2024-11-15 DIAGNOSIS — R42 VERTIGO: Primary | ICD-10-CM

## 2024-11-15 RX ORDER — MECLIZINE HYDROCHLORIDE 25 MG/1
25 TABLET ORAL 3 TIMES DAILY PRN
Qty: 30 TABLET | Refills: 0 | Status: SHIPPED | OUTPATIENT
Start: 2024-11-15 | End: 2024-11-25

## 2024-11-15 NOTE — PROGRESS NOTES
11/15/2024   Sharon Leong (: 1990) is a 34 y.o. female, New patient, here for evaluation of the following chief complaint(s):  Dizziness (Vertigo?, ears ringing, vision blurry- feels like room is spinning, started this week. )     ASSESSMENT/PLAN:  Below is the assessment and plan developed based on review of pertinent history, physical exam, labs, studies, and medications.  Assessment & Plan  Vertigo       Orders:    meclizine (ANTIVERT) 25 MG tablet; Take 1 tablet by mouth 3 times daily as needed for Dizziness    Exam and history are consistent with vertigo.  Given positive Bellflower-Hallpike, likely BPPV versus Ménière's versus vestibular neuritis.  Epley maneuver improved symptoms in office.  -Positioning maneuvers, such as the Epley, discussed.  May do at home as needed for treatment of symptoms  -Meclizine for symptomatic relief  -Exam reassuring for no acute ear infection.  Please follow-up with PCP in the next 1 to 2 weeks    If symptoms persist or worsen, please contact PCP and/or return to Urgent Care.     Handout given with care instructions  2. OTC for symptom management. Increase fluid intake, ensure adequate nutritional intake.  3. Follow up with PCP as needed.  4. Go to ED with development of any acute symptoms.     Follow up:  No follow-ups on file.  Follow up immediately for any new, worsening or changes or if symptoms are not improving over the next 5-7 days.     SUBJECTIVE/OBJECTIVE:  HPI   Ms Leong presents with complaints of ear ringing, blurry vision, and room spinning with certain positional changes for the past week.  She states she had similar symptoms in the past when she had vertigo along with a bilateral ear infection.  She states she has to go on a trip early next week, and is wondering if there is anything she can do to improve her symptoms.  Otherwise, she denies any current headache, fever, chills, shortness of breath, chest pain, or other systemic complaints or concerns at

## 2024-11-15 NOTE — PATIENT INSTRUCTIONS
You for visiting [facility]!    Exam and history are consistent with vertigo  -Positioning maneuvers, such as the Epley, discussed.  May do at home as needed for treatment of symptoms  -Meclizine for symptomatic relief  -Exam reassuring for no acute ear infection.  Please follow-up with PCP in the next 1 to 2 weeks    If symptoms persist or worsen, please contact PCP and/or return to Urgent Care.

## 2024-12-12 ENCOUNTER — TELEPHONE (OUTPATIENT)
Age: 34
End: 2024-12-12

## 2024-12-12 NOTE — TELEPHONE ENCOUNTER
Patient is requesting the following labs    YANETH  And any other labs that need to be checked    Patient # [phone]

## 2024-12-12 NOTE — TELEPHONE ENCOUNTER
Spoke with patient. Informed her Dr. Clements out of office. Instructed her to schedule appt with Dr. Clements (she was due for follow up 9/2024). Patient said she would schedule appt..

## 2025-01-14 NOTE — PROGRESS NOTES
Sharon Leong is a 34 y.o. female returns for an annual exam     Chief Complaint   Patient presents with    Annual Exam     Patient's last menstrual period was 01/05/2025 (exact date).  Her periods are varying in flow and usually regular with a 26-32 day interval with 3-7 day duration.  She does not have dysmenorrhea.  Problems: no problems  Birth Control: vasectomy.  Last Pap: Normal, HPV Negative obtained 9/13/22  She does not have a history of DOMENICO 2, 3 or cervical cancer.   With regard to the Gardisil vaccine, she has not received it yet      1. Have you been to the ER, urgent care clinic, or hospitalized since your last visit? No    2. Have you seen or consulted any other health care providers outside of the [facility] since your last visit? No    Examination chaperoned by Nithya Sharpe LPN..

## 2025-01-17 ENCOUNTER — OFFICE VISIT (OUTPATIENT)
Age: 35
End: 2025-01-17
Payer: MEDICAID

## 2025-01-17 VITALS
RESPIRATION RATE: 18 BRPM | BODY MASS INDEX: 33.21 KG/M2 | SYSTOLIC BLOOD PRESSURE: 126 MMHG | HEIGHT: 67 IN | DIASTOLIC BLOOD PRESSURE: 83 MMHG | WEIGHT: 211.6 LBS | HEART RATE: 105 BPM

## 2025-01-17 DIAGNOSIS — Z01.419 ENCOUNTER FOR GYNECOLOGICAL EXAMINATION (GENERAL) (ROUTINE) WITHOUT ABNORMAL FINDINGS: Primary | ICD-10-CM

## 2025-01-17 PROBLEM — Z34.80 SUPERVISION OF OTHER NORMAL PREGNANCY: Status: RESOLVED | Noted: 2021-06-08 | Resolved: 2025-01-17

## 2025-01-17 PROCEDURE — 99395 PREV VISIT EST AGE 18-39: CPT | Performed by: OBSTETRICS & GYNECOLOGY

## 2025-01-17 PROCEDURE — 99459 PELVIC EXAMINATION: CPT | Performed by: OBSTETRICS & GYNECOLOGY

## 2025-01-17 ASSESSMENT — PATIENT HEALTH QUESTIONNAIRE - PHQ9
SUM OF ALL RESPONSES TO PHQ QUESTIONS 1-9: 1
SUM OF ALL RESPONSES TO PHQ9 QUESTIONS 1 & 2: 1
1. LITTLE INTEREST OR PLEASURE IN DOING THINGS: SEVERAL DAYS
2. FEELING DOWN, DEPRESSED OR HOPELESS: NOT AT ALL

## 2025-01-17 NOTE — PROGRESS NOTES
Sharon Leong is a No obstetric history on file.,  34 y.o. female Black /  whose Patient's last menstrual period was 01/05/2025 (exact date).  was on 1/5/2025 who presents for her annual checkup. She is having no problems.  Her 3-year-old was diagnosed with type 1 diabetes.      Menstrual status:    Her periods are light, moderate in flow, regular    She denies dysmenorrhea.        Contraception:    The current method of family planning is vasectomy    Sexual history:    She  reports being sexually active and has had partner(s) who are male. She reports using the following method of birth control/protection: Surgical.      Pap and Mammogram History:  Last Pap: Normal, HPV Negative obtained 9/13/22  She does not have a history of DOMENICO 2, 3 or cervical cancer.   With regard to the Gardisil vaccine, she has not received it yet    The patient does not have a family history of breast cancer.  Family History   Adopted: Yes   Problem Relation Age of Onset    No Known Problems Mother     Anesth Problems Neg Hx     Gall Bladder Disease Maternal Grandmother     Hypertension Maternal Grandmother     Diabetes Maternal Uncle     No Known Problems Father        Past Medical History:   Diagnosis Date    Anemia     Chlamydia 1/8/2014    DOMENICO II (cervical intraepithelial neoplasia II)     Dysplasia of cervix, low grade (DOMENICO 1)     Encounter for insertion of Mirena IUD 05/25/2018    Mirena placed    Encounter for IUD removal 08/27/2020    Essential hypertension     Gestational hypertension     Headache      Past Surgical History:   Procedure Laterality Date    ADENOIDECTOMY  1994    ANTERIOR CRUCIATE LIGAMENT REPAIR Left 2008    ANTERIOR CRUCIATE LIGAMENT REPAIR  2019    CHOLECYSTECTOMY, LAPAROSCOPIC  10/04/2016    Laparoscopic cholecystectomy by Dr. Avalos.    COLPOSCOPY      4/28/10- ecto DOMENICO 2----4/5/12 DOMENICO 1---7/18/13-negative    GYN      IUD    KNEE ARTHROSCOPY      KNEE SURGERY      OTHER SURGICAL HISTORY

## 2025-01-24 ENCOUNTER — OFFICE VISIT (OUTPATIENT)
Age: 35
End: 2025-01-24
Payer: MEDICAID

## 2025-01-24 VITALS
RESPIRATION RATE: 16 BRPM | SYSTOLIC BLOOD PRESSURE: 128 MMHG | OXYGEN SATURATION: 100 % | BODY MASS INDEX: 33.15 KG/M2 | DIASTOLIC BLOOD PRESSURE: 77 MMHG | HEART RATE: 85 BPM | TEMPERATURE: 98.2 F | HEIGHT: 67 IN | WEIGHT: 211.2 LBS

## 2025-01-24 DIAGNOSIS — R35.0 FREQUENCY OF URINATION: ICD-10-CM

## 2025-01-24 DIAGNOSIS — E78.5 DYSLIPIDEMIA (HIGH LDL; LOW HDL): ICD-10-CM

## 2025-01-24 DIAGNOSIS — E03.9 HYPOTHYROIDISM, UNSPECIFIED TYPE: ICD-10-CM

## 2025-01-24 DIAGNOSIS — E55.9 VITAMIN D DEFICIENCY: ICD-10-CM

## 2025-01-24 DIAGNOSIS — Z91.09 ENVIRONMENTAL ALLERGIES: ICD-10-CM

## 2025-01-24 DIAGNOSIS — R73.03 BORDERLINE TYPE 2 DIABETES MELLITUS: ICD-10-CM

## 2025-01-24 DIAGNOSIS — R23.3 EASY BRUISABILITY: ICD-10-CM

## 2025-01-24 DIAGNOSIS — Z00.00 ADULT GENERAL MEDICAL EXAM: Primary | ICD-10-CM

## 2025-01-24 DIAGNOSIS — M25.449 SWELLING OF FINGER JOINT, UNSPECIFIED LATERALITY: ICD-10-CM

## 2025-01-24 PROCEDURE — 99214 OFFICE O/P EST MOD 30 MIN: CPT | Performed by: INTERNAL MEDICINE

## 2025-01-24 RX ORDER — MULTIVITAMIN WITH IRON
1 TABLET ORAL DAILY
COMMUNITY

## 2025-01-24 SDOH — ECONOMIC STABILITY: FOOD INSECURITY: WITHIN THE PAST 12 MONTHS, YOU WORRIED THAT YOUR FOOD WOULD RUN OUT BEFORE YOU GOT MONEY TO BUY MORE.: NEVER TRUE

## 2025-01-24 SDOH — ECONOMIC STABILITY: FOOD INSECURITY: WITHIN THE PAST 12 MONTHS, THE FOOD YOU BOUGHT JUST DIDN'T LAST AND YOU DIDN'T HAVE MONEY TO GET MORE.: NEVER TRUE

## 2025-01-24 NOTE — PROGRESS NOTES
Chief Complaint   Patient presents with    Bleeding/Bruising    Urticaria     With weather changes    Fever     After getting over the Flu       \"Have you been to the ER, urgent care clinic since your last visit?  Hospitalized since your last visit?\"    NO    “Have you seen or consulted any other health care providers outside of [facility] since your last visit?”    NO            Click Here for Release of Records Request       There were no vitals filed for this visit.   Health Maintenance Due   Topic Date Due    Varicella vaccine (1 of 2 - 13+ 2-dose series) Never done    Hepatitis B vaccine (1 of 3 - 19+ 3-dose series) Never done    Flu vaccine (1) 2024    COVID-19 Vaccine (2023- season) Never done        The patient, Sharon Leong, identity was verified by name and .     
sinus tenderness  Neck - supple, no significant adenopathy   Chest - no wheezes, rales or rhonchi, symmetric air entry   Heart - normal S1, S2, no murmurs, rubs  Abdomen - soft, nontender, nondistended, no organomegaly  Ext-peripheral pulses normal, no pedal edema  Skin-Warm and dry. no hyperpigmentation  Neuro -no focal findings   Back-full range of motion, no tenderness, palpable spasm or pain on motion     Assessment/Plan:  Sharon was seen today for bleeding/bruising, urticaria and fever.    Diagnoses and all orders for this visit:    Adult general medical exam  -     Comprehensive Metabolic Panel; Future  -     CBC with Auto Differential; Future  -     CBC with Auto Differential  -     Comprehensive Metabolic Panel    Environmental allergies  -     Comprehensive Metabolic Panel; Future  -     CBC with Auto Differential; Future    Hypothyroidism, unspecified type  -     TSH; Future  -     TSH    Dyslipidemia (high LDL; low HDL)  -     Lipid Panel; Future  -     Lipid Panel    Borderline type 2 diabetes mellitus  -     Hemoglobin A1C; Future  -     Hemoglobin A1C    Vitamin D deficiency  -     Vitamin D 25 Hydroxy; Future  -     Vitamin D 25 Hydroxy    Frequency of urination  -     Urinalysis with Reflex to Culture; Future    Swelling of finger joint, unspecified laterality  -     Comprehensive Metabolic Panel; Future  -     CBC with Auto Differential; Future  -     CBC with Auto Differential  -     Comprehensive Metabolic Panel    Easy bruisability  -     CBC with Auto Differential; Future  -     CBC with Auto Differential      Return 2-3 weeks, for results review.

## 2025-01-25 LAB
25(OH)D3+25(OH)D2 SERPL-MCNC: 24.2 NG/ML (ref 30–100)
ALBUMIN SERPL-MCNC: 4.4 G/DL (ref 3.9–4.9)
ALP SERPL-CCNC: 60 IU/L (ref 44–121)
ALT SERPL-CCNC: 12 IU/L (ref 0–32)
AST SERPL-CCNC: 20 IU/L (ref 0–40)
BASOPHILS # BLD AUTO: 0 X10E3/UL (ref 0–0.2)
BASOPHILS NFR BLD AUTO: 1 %
BILIRUB SERPL-MCNC: 0.7 MG/DL (ref 0–1.2)
BUN SERPL-MCNC: 6 MG/DL (ref 6–20)
BUN/CREAT SERPL: 7 (ref 9–23)
CALCIUM SERPL-MCNC: 9.1 MG/DL (ref 8.7–10.2)
CHLORIDE SERPL-SCNC: 103 MMOL/L (ref 96–106)
CHOLEST SERPL-MCNC: 184 MG/DL (ref 100–199)
CO2 SERPL-SCNC: 24 MMOL/L (ref 20–29)
CREAT SERPL-MCNC: 0.85 MG/DL (ref 0.57–1)
EGFRCR SERPLBLD CKD-EPI 2021: 92 ML/MIN/1.73
EOSINOPHIL # BLD AUTO: 0.1 X10E3/UL (ref 0–0.4)
EOSINOPHIL NFR BLD AUTO: 1 %
ERYTHROCYTE [DISTWIDTH] IN BLOOD BY AUTOMATED COUNT: 11.6 % (ref 11.7–15.4)
GLOBULIN SER CALC-MCNC: 2 G/DL (ref 1.5–4.5)
GLUCOSE SERPL-MCNC: 89 MG/DL (ref 70–99)
HBA1C MFR BLD: 5 % (ref 4.8–5.6)
HCT VFR BLD AUTO: 41 % (ref 34–46.6)
HDLC SERPL-MCNC: 64 MG/DL
HGB BLD-MCNC: 13.7 G/DL (ref 11.1–15.9)
IMM GRANULOCYTES # BLD AUTO: 0 X10E3/UL (ref 0–0.1)
IMM GRANULOCYTES NFR BLD AUTO: 0 %
LDLC SERPL CALC-MCNC: 105 MG/DL (ref 0–99)
LYMPHOCYTES # BLD AUTO: 2.2 X10E3/UL (ref 0.7–3.1)
LYMPHOCYTES NFR BLD AUTO: 30 %
MCH RBC QN AUTO: 31.6 PG (ref 26.6–33)
MCHC RBC AUTO-ENTMCNC: 33.4 G/DL (ref 31.5–35.7)
MCV RBC AUTO: 95 FL (ref 79–97)
MONOCYTES # BLD AUTO: 0.5 X10E3/UL (ref 0.1–0.9)
MONOCYTES NFR BLD AUTO: 6 %
NEUTROPHILS # BLD AUTO: 4.6 X10E3/UL (ref 1.4–7)
NEUTROPHILS NFR BLD AUTO: 62 %
PLATELET # BLD AUTO: 329 X10E3/UL (ref 150–450)
POTASSIUM SERPL-SCNC: 4 MMOL/L (ref 3.5–5.2)
PROT SERPL-MCNC: 6.4 G/DL (ref 6–8.5)
RBC # BLD AUTO: 4.34 X10E6/UL (ref 3.77–5.28)
SODIUM SERPL-SCNC: 141 MMOL/L (ref 134–144)
TRIGL SERPL-MCNC: 82 MG/DL (ref 0–149)
TSH SERPL DL<=0.005 MIU/L-ACNC: 2.61 UIU/ML (ref 0.45–4.5)
VLDLC SERPL CALC-MCNC: 15 MG/DL (ref 5–40)
WBC # BLD AUTO: 7.4 X10E3/UL (ref 3.4–10.8)

## 2025-01-28 ENCOUNTER — TELEPHONE (OUTPATIENT)
Age: 35
End: 2025-01-28

## 2025-01-28 DIAGNOSIS — N30.01 ACUTE CYSTITIS WITH HEMATURIA: Primary | ICD-10-CM

## 2025-01-28 LAB
APPEARANCE UR: ABNORMAL
BACTERIA #/AREA URNS HPF: ABNORMAL /[HPF]
BACTERIA UR CULT: NORMAL
BILIRUB UR QL STRIP: NEGATIVE
CASTS URNS QL MICRO: ABNORMAL /LPF
COLOR UR: YELLOW
EPI CELLS #/AREA URNS HPF: ABNORMAL /HPF (ref 0–10)
GLUCOSE UR QL STRIP: NEGATIVE
HGB UR QL STRIP: ABNORMAL
KETONES UR QL STRIP: NEGATIVE
LEUKOCYTE ESTERASE UR QL STRIP: ABNORMAL
MICRO URNS: ABNORMAL
NITRITE UR QL STRIP: NEGATIVE
PH UR STRIP: 7 [PH] (ref 5–7.5)
PROT UR QL STRIP: NEGATIVE
RBC #/AREA URNS HPF: ABNORMAL /HPF (ref 0–2)
SP GR UR STRIP: 1.01 (ref 1–1.03)
URINALYSIS REFLEX: ABNORMAL
UROBILINOGEN UR STRIP-MCNC: 0.2 MG/DL (ref 0.2–1)
WBC #/AREA URNS HPF: ABNORMAL /HPF (ref 0–5)

## 2025-01-28 RX ORDER — CIPROFLOXACIN 500 MG/1
500 TABLET, FILM COATED ORAL 2 TIMES DAILY
Qty: 14 TABLET | Refills: 0 | Status: SHIPPED | OUTPATIENT
Start: 2025-01-28 | End: 2025-02-04

## 2025-01-28 NOTE — TELEPHONE ENCOUNTER
Patient called,   she states the urine results are in  mychart came back today    She states it shows she has a UTI moderate     She has still has a low grade fever for about 1 week  she is still having low back pain  off & on    Can Dr Clements review the results and prescribe something for her    Patient has a follow up appointment on 1-31-25     She would like something prescribed before then    [facility]    Patient's phone [phone]

## 2025-01-28 NOTE — TELEPHONE ENCOUNTER
Message printed and given to DR Clements to review results.. Patient was advised message was forwarded but currently has no burning or urinary frequency.

## 2025-01-31 ENCOUNTER — OFFICE VISIT (OUTPATIENT)
Age: 35
End: 2025-01-31
Payer: MEDICAID

## 2025-01-31 VITALS
RESPIRATION RATE: 16 BRPM | WEIGHT: 208 LBS | HEART RATE: 89 BPM | HEIGHT: 67 IN | BODY MASS INDEX: 32.65 KG/M2 | SYSTOLIC BLOOD PRESSURE: 114 MMHG | OXYGEN SATURATION: 97 % | DIASTOLIC BLOOD PRESSURE: 80 MMHG | TEMPERATURE: 98 F

## 2025-01-31 DIAGNOSIS — R23.3 EASY BRUISABILITY: ICD-10-CM

## 2025-01-31 DIAGNOSIS — M06.9 RHEUMATOID ARTHRITIS OF OTHER SITE, UNSPECIFIED WHETHER RHEUMATOID FACTOR PRESENT (HCC): ICD-10-CM

## 2025-01-31 DIAGNOSIS — R73.03 BORDERLINE TYPE 2 DIABETES MELLITUS: ICD-10-CM

## 2025-01-31 DIAGNOSIS — E55.9 VITAMIN D DEFICIENCY: ICD-10-CM

## 2025-01-31 DIAGNOSIS — M25.449 SWELLING OF FINGER JOINT, UNSPECIFIED LATERALITY: ICD-10-CM

## 2025-01-31 DIAGNOSIS — E78.5 DYSLIPIDEMIA (HIGH LDL; LOW HDL): ICD-10-CM

## 2025-01-31 DIAGNOSIS — E03.9 HYPOTHYROIDISM, UNSPECIFIED TYPE: ICD-10-CM

## 2025-01-31 DIAGNOSIS — Z91.09 ENVIRONMENTAL ALLERGIES: ICD-10-CM

## 2025-01-31 DIAGNOSIS — M06.9 RHEUMATOID ARTHRITIS OF OTHER SITE, UNSPECIFIED WHETHER RHEUMATOID FACTOR PRESENT (HCC): Primary | ICD-10-CM

## 2025-01-31 DIAGNOSIS — Z00.00 ADULT GENERAL MEDICAL EXAM: ICD-10-CM

## 2025-01-31 PROCEDURE — 99214 OFFICE O/P EST MOD 30 MIN: CPT | Performed by: INTERNAL MEDICINE

## 2025-01-31 NOTE — PROGRESS NOTES
Chief Complaint   Patient presents with    Follow-up       \"Have you been to the ER, urgent care clinic since your last visit?  Hospitalized since your last visit?\"    NO    “Have you seen or consulted any other health care providers outside of [facility] since your last visit?”    NO            Click Here for Release of Records Request       There were no vitals filed for this visit.   Health Maintenance Due   Topic Date Due    Varicella vaccine (1 of 2 - 13+ 2-dose series) Never done    Hepatitis B vaccine (1 of 3 - 19+ 3-dose series) Never done        The patient, Sharon Leong, identity was verified by name and .

## 2025-02-03 NOTE — PROGRESS NOTES
Chief Complaint   Patient presents with    Follow-up     HPI:  Sharon Leong is a 34 y.o. female presents to discuss lab results.  Also, she has c/o persistent, recurrent joint pain. Denies swelling  She requesting to see a rheumatologist.    Review of Systems  As per hpi    Past Medical History:   Diagnosis Date    Anemia     Chlamydia 2014    DOMENICO II (cervical intraepithelial neoplasia II)     Dysplasia of cervix, low grade (DOMENICO 1)     Encounter for insertion of Mirena IUD 2018    Mirena placed    Encounter for IUD removal 2020    Essential hypertension     Gestational hypertension     Headache      Past Surgical History:   Procedure Laterality Date    ADENOIDECTOMY      ANTERIOR CRUCIATE LIGAMENT REPAIR Left 2008    ANTERIOR CRUCIATE LIGAMENT REPAIR  2019    CHOLECYSTECTOMY, LAPAROSCOPIC  10/04/2016    Laparoscopic cholecystectomy by Dr. Avalos.    COLPOSCOPY      4/28/10- ecto DOMENICO 2----12 DOMENICO 1---13-negative    GYN      IUD    KNEE ARTHROSCOPY      KNEE SURGERY      OTHER SURGICAL HISTORY      TONSILLECTOMY  2014     Social History     Socioeconomic History    Marital status: Single     Spouse name: None    Number of children: None    Years of education: None    Highest education level: None   Tobacco Use    Smoking status: Former     Current packs/day: 0.00     Types: Cigarettes     Quit date: 2017     Years since quittin.0    Smokeless tobacco: Never   Vaping Use    Vaping status: Never Used   Substance and Sexual Activity    Alcohol use: Yes    Drug use: Not Currently    Sexual activity: Yes     Partners: Male     Birth control/protection: Surgical     Comment: vasectomy     Social Determinants of Health     Financial Resource Strain: Low Risk  (2023)    Overall Financial Resource Strain (CARDIA)     Difficulty of Paying Living Expenses: Not hard at all   Food Insecurity: No Food Insecurity (2025)    Hunger Vital Sign     Worried About Running Out of Food in the

## 2025-02-14 LAB
ANA SER QL: NEGATIVE
CRP SERPL HS-MCNC: <0.15 MG/L (ref 0–3)
ERYTHROCYTE [SEDIMENTATION RATE] IN BLOOD BY WESTERGREN METHOD: 6 MM/HR (ref 0–32)

## 2025-02-24 LAB
14-3-3 ETA AG SER IA-MCNC: <0.2 NG/ML
CCP IGA+IGG SERPL IA-ACNC: <20 UNITS
RHEUMATOID FACT SERPL-ACNC: <14 UNITS/ML

## 2025-05-26 NOTE — TELEPHONE ENCOUNTER
Changed appointment to VV on 12/22/2023  
Sharon damon  [id number]  [phone]   Went to [facility] er yesterday   And still having bad symptoms   Bronictics etc   I  made her appt for [id number] and she still waits to talk to the nurse to know what else she can do for her symptoms I think she said she was throwing up and getting dehirated  
Statement Selected

## 2025-06-17 NOTE — PROGRESS NOTES
Sharon Leong is a 34 y.o. female presents for a problem visit.    Chief Complaint   Patient presents with    Other     Patient's last menstrual period was 06/04/2025.  Birth Control: vasectomy.  Last Pap: Normal, HPV Negative obtained 9/13/22    The patient is here to review empower results      1. Have you been to the ER, urgent care clinic, or hospitalized since your last visit? No    2. Have you seen or consulted any other health care providers outside of the [facility] since your last visit? No    Examination chaperoned by Nithya Sharpe LPN.

## 2025-06-18 ENCOUNTER — RESULTS FOLLOW-UP (OUTPATIENT)
Age: 35
End: 2025-06-18

## 2025-06-18 ENCOUNTER — OFFICE VISIT (OUTPATIENT)
Age: 35
End: 2025-06-18
Payer: MEDICAID

## 2025-06-18 VITALS
OXYGEN SATURATION: 100 % | HEART RATE: 90 BPM | DIASTOLIC BLOOD PRESSURE: 84 MMHG | RESPIRATION RATE: 16 BRPM | WEIGHT: 220 LBS | SYSTOLIC BLOOD PRESSURE: 119 MMHG | HEIGHT: 67 IN | BODY MASS INDEX: 34.53 KG/M2 | TEMPERATURE: 98.2 F

## 2025-06-18 DIAGNOSIS — R73.03 BORDERLINE TYPE 2 DIABETES MELLITUS: ICD-10-CM

## 2025-06-18 DIAGNOSIS — E55.9 VITAMIN D DEFICIENCY, UNSPECIFIED: ICD-10-CM

## 2025-06-18 DIAGNOSIS — R35.0 FREQUENCY OF URINATION: ICD-10-CM

## 2025-06-18 DIAGNOSIS — L50.9 URTICARIA: Primary | ICD-10-CM

## 2025-06-18 DIAGNOSIS — E55.9 VITAMIN D DEFICIENCY: ICD-10-CM

## 2025-06-18 DIAGNOSIS — E53.8 B12 DEFICIENCY: ICD-10-CM

## 2025-06-18 DIAGNOSIS — R23.3 EASY BRUISABILITY: ICD-10-CM

## 2025-06-18 PROBLEM — R55 SYNCOPE: Status: ACTIVE | Noted: 2024-02-16

## 2025-06-18 PROBLEM — R00.2 PALPITATIONS: Status: ACTIVE | Noted: 2024-02-16

## 2025-06-18 PROBLEM — Z95.818 IMPLANTABLE LOOP RECORDER PRESENT: Status: ACTIVE | Noted: 2024-07-11

## 2025-06-18 PROCEDURE — 99214 OFFICE O/P EST MOD 30 MIN: CPT | Performed by: INTERNAL MEDICINE

## 2025-06-18 RX ORDER — CYANOCOBALAMIN 1000 UG/ML
1000 INJECTION, SOLUTION INTRAMUSCULAR; SUBCUTANEOUS
COMMUNITY

## 2025-06-18 ASSESSMENT — PATIENT HEALTH QUESTIONNAIRE - PHQ9
2. FEELING DOWN, DEPRESSED OR HOPELESS: NOT AT ALL
SUM OF ALL RESPONSES TO PHQ QUESTIONS 1-9: 0
1. LITTLE INTEREST OR PLEASURE IN DOING THINGS: NOT AT ALL
SUM OF ALL RESPONSES TO PHQ QUESTIONS 1-9: 0

## 2025-06-18 NOTE — PROGRESS NOTES
Chief Complaint   Patient presents with    Results    Follow-up       \"Have you been to the ER, urgent care clinic since your last visit?  Hospitalized since your last visit?\"    NO    “Have you seen or consulted any other health care providers outside of [facility] since your last visit?”    YES - When: approximately 2  weeks ago.  [facility]            Click Here for Release of Records Request       There were no vitals filed for this visit.   Health Maintenance Due   Topic Date Due    Varicella vaccine (1 of 2 - 13+ 2-dose series) Never done    Hepatitis B vaccine (1 of 3 - 19+ 3-dose series) Never done        The patient, Sharon Leong, identity was verified by name and .

## 2025-06-19 LAB
25(OH)D3+25(OH)D2 SERPL-MCNC: 44.8 NG/ML (ref 30–100)
ALBUMIN SERPL-MCNC: 4.4 G/DL (ref 3.9–4.9)
ALP SERPL-CCNC: 63 IU/L (ref 44–121)
ALT SERPL-CCNC: 12 IU/L (ref 0–32)
AST SERPL-CCNC: 13 IU/L (ref 0–40)
BASOPHILS # BLD AUTO: 0 X10E3/UL (ref 0–0.2)
BASOPHILS NFR BLD AUTO: 0 %
BILIRUB SERPL-MCNC: 0.4 MG/DL (ref 0–1.2)
BUN SERPL-MCNC: 8 MG/DL (ref 6–20)
BUN/CREAT SERPL: 9 (ref 9–23)
CALCIUM SERPL-MCNC: 8.8 MG/DL (ref 8.7–10.2)
CHLORIDE SERPL-SCNC: 104 MMOL/L (ref 96–106)
CO2 SERPL-SCNC: 22 MMOL/L (ref 20–29)
CREAT SERPL-MCNC: 0.91 MG/DL (ref 0.57–1)
EGFRCR SERPLBLD CKD-EPI 2021: 85 ML/MIN/1.73
EOSINOPHIL # BLD AUTO: 0.1 X10E3/UL (ref 0–0.4)
EOSINOPHIL NFR BLD AUTO: 2 %
ERYTHROCYTE [DISTWIDTH] IN BLOOD BY AUTOMATED COUNT: 12.2 % (ref 11.7–15.4)
FOLATE SERPL-MCNC: 5.4 NG/ML
GLOBULIN SER CALC-MCNC: 2 G/DL (ref 1.5–4.5)
GLUCOSE SERPL-MCNC: 91 MG/DL (ref 70–99)
HBA1C MFR BLD: 5 % (ref 4.8–5.6)
HCT VFR BLD AUTO: 41.2 % (ref 34–46.6)
HGB BLD-MCNC: 13.5 G/DL (ref 11.1–15.9)
IMM GRANULOCYTES # BLD AUTO: 0 X10E3/UL (ref 0–0.1)
IMM GRANULOCYTES NFR BLD AUTO: 0 %
LYMPHOCYTES # BLD AUTO: 1.5 X10E3/UL (ref 0.7–3.1)
LYMPHOCYTES NFR BLD AUTO: 23 %
MCH RBC QN AUTO: 31.9 PG (ref 26.6–33)
MCHC RBC AUTO-ENTMCNC: 32.8 G/DL (ref 31.5–35.7)
MCV RBC AUTO: 97 FL (ref 79–97)
MONOCYTES # BLD AUTO: 0.4 X10E3/UL (ref 0.1–0.9)
MONOCYTES NFR BLD AUTO: 5 %
NEUTROPHILS # BLD AUTO: 4.6 X10E3/UL (ref 1.4–7)
NEUTROPHILS NFR BLD AUTO: 70 %
PLATELET # BLD AUTO: 277 X10E3/UL (ref 150–450)
POTASSIUM SERPL-SCNC: 4.3 MMOL/L (ref 3.5–5.2)
PROT SERPL-MCNC: 6.4 G/DL (ref 6–8.5)
RBC # BLD AUTO: 4.23 X10E6/UL (ref 3.77–5.28)
SODIUM SERPL-SCNC: 141 MMOL/L (ref 134–144)
VIT B12 SERPL-MCNC: 1319 PG/ML (ref 232–1245)
WBC # BLD AUTO: 6.7 X10E3/UL (ref 3.4–10.8)

## 2025-06-20 LAB
APPEARANCE UR: CLEAR
BACTERIA #/AREA URNS HPF: NORMAL /[HPF]
BACTERIA UR CULT: NORMAL
BILIRUB UR QL STRIP: NEGATIVE
CASTS URNS QL MICRO: NORMAL /LPF
COLOR UR: YELLOW
EPI CELLS #/AREA URNS HPF: NORMAL /HPF (ref 0–10)
GLUCOSE UR QL STRIP: NEGATIVE
HGB UR QL STRIP: NEGATIVE
KETONES UR QL STRIP: NEGATIVE
LEUKOCYTE ESTERASE UR QL STRIP: ABNORMAL
MICRO URNS: ABNORMAL
NITRITE UR QL STRIP: NEGATIVE
PH UR STRIP: 6.5 [PH] (ref 5–7.5)
PROT UR QL STRIP: NEGATIVE
RBC #/AREA URNS HPF: NORMAL /HPF (ref 0–2)
SP GR UR STRIP: 1.02 (ref 1–1.03)
URINALYSIS REFLEX: ABNORMAL
UROBILINOGEN UR STRIP-MCNC: 0.2 MG/DL (ref 0.2–1)
WBC #/AREA URNS HPF: NORMAL /HPF (ref 0–5)

## 2025-06-25 DIAGNOSIS — E55.9 VITAMIN D DEFICIENCY: ICD-10-CM

## 2025-06-25 DIAGNOSIS — R23.3 EASY BRUISABILITY: ICD-10-CM

## 2025-06-25 DIAGNOSIS — E53.8 B12 DEFICIENCY: ICD-10-CM

## 2025-06-25 DIAGNOSIS — R73.03 BORDERLINE TYPE 2 DIABETES MELLITUS: ICD-10-CM

## 2025-06-25 DIAGNOSIS — E55.9 VITAMIN D DEFICIENCY, UNSPECIFIED: ICD-10-CM

## 2025-06-25 DIAGNOSIS — L50.9 URTICARIA: ICD-10-CM

## 2025-07-07 ENCOUNTER — OFFICE VISIT (OUTPATIENT)
Age: 35
End: 2025-07-07
Payer: MEDICAID

## 2025-07-07 VITALS
DIASTOLIC BLOOD PRESSURE: 84 MMHG | BODY MASS INDEX: 34.8 KG/M2 | SYSTOLIC BLOOD PRESSURE: 127 MMHG | HEART RATE: 109 BPM | WEIGHT: 222.2 LBS

## 2025-07-07 DIAGNOSIS — N94.6 DYSMENORRHEA: ICD-10-CM

## 2025-07-07 DIAGNOSIS — N92.6 IRREGULAR MENSES: ICD-10-CM

## 2025-07-07 DIAGNOSIS — R89.8 ABNORMAL GENETIC TEST: Primary | ICD-10-CM

## 2025-07-07 PROCEDURE — 99213 OFFICE O/P EST LOW 20 MIN: CPT | Performed by: OBSTETRICS & GYNECOLOGY

## 2025-07-07 RX ORDER — DROSPIRENONE 4 MG/1
1 TABLET, FILM COATED ORAL DAILY
Qty: 84 TABLET | Refills: 2 | Status: SHIPPED | OUTPATIENT
Start: 2025-07-07

## 2025-07-07 NOTE — PROGRESS NOTES
History:  Sharon Leong is a 34 y.o. year old No obstetric history on file. Black /  female   Patient's last menstrual period was 06/04/2025.    She presents for problem visit  -------------------------------------------------------------------------------------------------------------------    History of Present Illness  The patient presents for evaluation of menstrual irregularities and genetic testing.    She has been experiencing unexplained bruising and fatigue, which led her to undergo whole genome sequencing through [employer]. The results indicated several red flags for BRCA1 and BRCA2, suggesting she may be a carrier. She also discovered a high risk for severe COVID-19. She has a family history of breast cancer on both maternal and paternal sides, with her aunt currently battling the disease in her 50s and her grandmother having undergone treatment in 2020. She is adopted and plans to gather more information about her birth mother's medical history from her grandmother.    She has been dealing with irregular menstrual cycles, characterized by constant cramping even during her period, which is unusual for her. Her periods have been either excessively long, lasting over 9 days, or extremely short, lasting only a day and a half. She experienced severe cramping two Thursdays ago, which was followed by a period of normal flow that lasted only 2 hours before stopping. She describes her pain as similar to what she experienced with the Mirena IUD, constant and located in the lower abdomen, but without any bleeding. The intermenstrual bleeding has ceased, but she continues to experience constant pressure and pain. This new symptom is causing her significant stress and impacting her daily activities due to the associated fatigue and pain. She has previously tried birth control pills, which were effective initially but lost their efficacy over time. She has also had allergic reactions  [Home] : at home, [unfilled] , at the time of the visit. [Patient] : the patient [Medical Office: ([facility])___] : at the medical office located in  [Self] : self [Follow-Up: _____] : a [unfilled] follow-up visit [FreeTextEntry4] : Juanita Payne [FreeTextEntry1] : Pt wants to know if he has a leaky gut or food intolerance.  Pt c/o bloating, loose stools, abdominal cramping x 2 years.  He noticed these sxs occur when he eat foods with gluten and meat, non-complex carbohydrates. Pt also has ocassional nausea.

## 2025-07-14 ENCOUNTER — OFFICE VISIT (OUTPATIENT)
Age: 35
End: 2025-07-14
Payer: MEDICAID

## 2025-07-14 VITALS
OXYGEN SATURATION: 99 % | SYSTOLIC BLOOD PRESSURE: 117 MMHG | HEART RATE: 90 BPM | BODY MASS INDEX: 34.5 KG/M2 | HEIGHT: 67 IN | WEIGHT: 219.8 LBS | DIASTOLIC BLOOD PRESSURE: 82 MMHG

## 2025-07-14 DIAGNOSIS — R10.2 PELVIC PAIN: Primary | ICD-10-CM

## 2025-07-14 DIAGNOSIS — N92.6 IRREGULAR MENSES: Primary | ICD-10-CM

## 2025-07-14 PROCEDURE — 99213 OFFICE O/P EST LOW 20 MIN: CPT | Performed by: OBSTETRICS & GYNECOLOGY

## 2025-07-14 NOTE — PROGRESS NOTES
Sharon Leong is a 34 y.o. female presents for a problem visit.    Chief Complaint   Patient presents with    US Followup     Patient's last menstrual period was 06/27/2025 (exact date).  Birth Control: vasectomy.  Last Pap: normal HPV negative obtained 9/13/2022    The patient presents for pelvic pain        1. Have you been to the ER, urgent care clinic, or hospitalized since your last visit? No    2. Have you seen or consulted any other health care providers outside of the [facility] since your last visit? No    Examination chaperoned by Pedro Gunderson MA.

## 2025-07-14 NOTE — PROGRESS NOTES
History:  Sharon Leong is a 34 y.o. year old No obstetric history on file. Black /  female   Patient's last menstrual period was 06/27/2025 (exact date).    She presents for problem visit  Nurse Notes:  Patient's last menstrual period was 06/27/2025 (exact date).  Birth Control: vasectomy.  Last Pap: normal HPV negative obtained 9/13/2022     The patient presents for pelvic pain     -------------------------------------------------------------------------------------------------------------------    History of Present Illness  The patient presents for evaluation of irregular periods.    Her last menstrual cycle was on 06/27/2025, which was notably shorter than usual, lasting only a day and a half. She experienced significant cramping during this period. She has not yet started taking Slynd.    Family History   Adopted: Yes   Problem Relation Age of Onset    No Known Problems Mother     Anesth Problems Neg Hx     Gall Bladder Disease Maternal Grandmother     Hypertension Maternal Grandmother     Diabetes Maternal Uncle     No Known Problems Father        Past Medical History:   Diagnosis Date    Anemia     Chlamydia 1/8/2014    DOMENICO II (cervical intraepithelial neoplasia II)     Dysplasia of cervix, low grade (DOMENICO 1)     Encounter for insertion of Mirena IUD 05/25/2018    Mirena placed    Encounter for IUD removal 08/27/2020    Essential hypertension     Gestational hypertension     Headache      Past Surgical History:   Procedure Laterality Date    ADENOIDECTOMY  1994    ANTERIOR CRUCIATE LIGAMENT REPAIR Left 2008    ANTERIOR CRUCIATE LIGAMENT REPAIR  2019    CHOLECYSTECTOMY, LAPAROSCOPIC  10/04/2016    Laparoscopic cholecystectomy by Dr. Avalos.    COLPOSCOPY      4/28/10- ecto DOMENICO 2----4/5/12 DOMENICO 1---7/18/13-negative    GYN      IUD    KNEE ARTHROSCOPY      KNEE SURGERY      OTHER SURGICAL HISTORY      TONSILLECTOMY  2014       Current Outpatient Medications   Medication Sig Dispense Refill

## 2025-07-19 LAB
Lab: NEGATIVE
Lab: NORMAL

## 2025-07-31 ENCOUNTER — OFFICE VISIT (OUTPATIENT)
Age: 35
End: 2025-07-31
Payer: MEDICAID

## 2025-07-31 VITALS
BODY MASS INDEX: 34.44 KG/M2 | HEIGHT: 67 IN | RESPIRATION RATE: 16 BRPM | HEART RATE: 88 BPM | DIASTOLIC BLOOD PRESSURE: 74 MMHG | TEMPERATURE: 98.5 F | WEIGHT: 219.4 LBS | SYSTOLIC BLOOD PRESSURE: 112 MMHG | OXYGEN SATURATION: 99 %

## 2025-07-31 DIAGNOSIS — R42 VERTIGO: Primary | ICD-10-CM

## 2025-07-31 PROCEDURE — 99213 OFFICE O/P EST LOW 20 MIN: CPT | Performed by: INTERNAL MEDICINE

## 2025-07-31 RX ORDER — MECLIZINE HYDROCHLORIDE 25 MG/1
TABLET ORAL
COMMUNITY

## 2025-07-31 RX ORDER — ONDANSETRON 4 MG/1
TABLET, ORALLY DISINTEGRATING ORAL
COMMUNITY
Start: 2025-07-23 | End: 2025-07-31

## 2025-07-31 ASSESSMENT — PATIENT HEALTH QUESTIONNAIRE - PHQ9
2. FEELING DOWN, DEPRESSED OR HOPELESS: NOT AT ALL
1. LITTLE INTEREST OR PLEASURE IN DOING THINGS: NOT AT ALL
SUM OF ALL RESPONSES TO PHQ QUESTIONS 1-9: 0

## 2025-08-13 ENCOUNTER — TELEPHONE (OUTPATIENT)
Age: 35
End: 2025-08-13

## 2025-08-13 DIAGNOSIS — H66.003 ACUTE SUPPURATIVE OTITIS MEDIA OF BOTH EARS WITHOUT SPONTANEOUS RUPTURE OF TYMPANIC MEMBRANES, RECURRENCE NOT SPECIFIED: Primary | ICD-10-CM

## (undated) DEVICE — TOWEL SURG W17XL27IN STD BLU COT NONFENESTRATED PREWASHED

## (undated) DEVICE — PAD,ABDOMINAL,5"X9",ST,LF,25/BX: Brand: MEDLINE INDUSTRIES, INC.

## (undated) DEVICE — ROCKER SWITCH PENCIL BLADE ELECTRODE, HOLSTER: Brand: EDGE

## (undated) DEVICE — INFECTION CONTROL KIT SYS

## (undated) DEVICE — STOCKING COMPR L L29-31IN REG 19MMHG ANK 9-10IN CALF

## (undated) DEVICE — Z CONVERTED USE 2271116 TUBING ASPIR 9 FT STRL ULTRA-LIMP

## (undated) DEVICE — 4.5 MM INCISOR PLUS STRAIGHT                                    BLADES, POWER/EP-1, VIOLET, PACKAGED                                    6 PER BOX, STERILE

## (undated) DEVICE — DRAPE,REIN 53X77,STERILE: Brand: MEDLINE

## (undated) DEVICE — SYR 10ML LUER LOK 1/5ML GRAD --

## (undated) DEVICE — SUT ETHLN 3-0 18IN PS2 BLK --

## (undated) DEVICE — SYR 3ML LL TIP 1/10ML GRAD --

## (undated) DEVICE — KIT INSTR TRANSTIBIAL ACL W/ SAW BLDE DISP

## (undated) DEVICE — STRAP,POSITIONING,KNEE/BODY,FOAM,4X60": Brand: MEDLINE

## (undated) DEVICE — SYR 50ML LR LCK 1ML GRAD NSAF --

## (undated) DEVICE — SOLUTION IRRIG 3000ML LAC R FLX CONT

## (undated) DEVICE — INTENDED FOR TISSUE SEPARATION, AND OTHER PROCEDURES THAT REQUIRE A SHARP SURGICAL BLADE TO PUNCTURE OR CUT.: Brand: BARD-PARKER ® CARBON RIB-BACK BLADES

## (undated) DEVICE — SUTURE VCRL SZ 0 L27IN ABSRB UD L36MM CP-1 1/2 CIR REV CUT J267H

## (undated) DEVICE — TUR SERIES SET

## (undated) DEVICE — TUBING SUCT L9FT FOR AUTOFUSE INFLTR SYS

## (undated) DEVICE — TRAY PREP DRY W/ PREM GLV 2 APPL 6 SPNG 2 UNDPD 1 OVERWRAP

## (undated) DEVICE — Device

## (undated) DEVICE — ZIMMER® STERILE DISPOSABLE TOURNIQUET CUFF WITH PLC, DUAL PORT, SINGLE BLADDER, 24 IN. (61 CM)

## (undated) DEVICE — SPONGE GZ W4XL4IN COT 12 PLY TYP VII WVN C FLD DSGN

## (undated) DEVICE — NEEDLE HYPO 25GA L1.5IN BVL ORIENTED ECLIPSE

## (undated) DEVICE — DRAIN SURG PENROSE 0.25X18 IN CLOSED WND DRAINAGE PREM SIL

## (undated) DEVICE — TWO LEAD ARTHROSCOPIC IRRIGATION SET

## (undated) DEVICE — STERILE POLYISOPRENE POWDER-FREE SURGICAL GLOVES: Brand: PROTEXIS

## (undated) DEVICE — DRAIN WND PENRS RADPQ 0.25X12 --

## (undated) DEVICE — TUBING SUCT 10FR MAL ALUM SHFT FN CAP VENT UNIV CONN W/ OBT

## (undated) DEVICE — STERILE POLYISOPRENE POWDER-FREE SURGICAL GLOVES WITH EMOLLIENT COATING: Brand: PROTEXIS

## (undated) DEVICE — HANDLE LT SNAP ON ULT DURABLE LENS FOR TRUMPF ALC DISPOSABLE

## (undated) DEVICE — PADDING CST 6IN STERILE --

## (undated) DEVICE — SOLUTION IV 1000ML 0.9% SOD CHL

## (undated) DEVICE — PREP SKN PREVAIL 40ML APPL --

## (undated) DEVICE — PASSER SUT WIRE STR DISP

## (undated) DEVICE — SPONGE LAP 18X18IN STRL -- 5/PK

## (undated) DEVICE — GARMENT,MEDLINE,DVT,INT,CALF,MED, GEN2: Brand: MEDLINE

## (undated) DEVICE — SURGICAL PROCEDURE PACK BASIN MAJ SET CUST NO CAUT

## (undated) DEVICE — SUTURE VCRL SZ 2-0 L27IN ABSRB UD L26MM SH 1/2 CIR J417H

## (undated) DEVICE — ARTHROSCOPY RICHMOND-LF: Brand: MEDLINE INDUSTRIES, INC.

## (undated) DEVICE — DRAPE,EXTREMITY,89X128,STERILE: Brand: MEDLINE

## (undated) DEVICE — SUT ETHLN 4-0 18IN PS2 BLK --

## (undated) DEVICE — ASTOUND STANDARD SURGICAL GOWN, XXL: Brand: CONVERTORS

## (undated) DEVICE — PACK,BASIC,SIRUS,V: Brand: MEDLINE

## (undated) DEVICE — PACK,ORTOHMAX/CVMAX,UNIVERSAL,5/CS: Brand: MEDLINE

## (undated) DEVICE — DRESSING PETRO W3XL8IN N ADH OIL EMUL GZ CURAD

## (undated) DEVICE — 3M™ TEGADERM™ TRANSPARENT FILM DRESSING FRAME STYLE, 1626W, 4 IN X 4-3/4 IN (10 CM X 12 CM), 50/CT 4CT/CASE: Brand: 3M™ TEGADERM™

## (undated) DEVICE — SUTURE VCRL SZ 3-0 L27IN ABSRB UD FS-2 L19MM 1/2 CIR J423H

## (undated) DEVICE — STRIP,CLOSURE,WOUND,MEDI-STRIP,1/2X4: Brand: MEDLINE

## (undated) DEVICE — PADDING CAST SOF-ROL 6INX4YD --

## (undated) DEVICE — REM POLYHESIVE ADULT PATIENT RETURN ELECTRODE: Brand: VALLEYLAB

## (undated) DEVICE — RINGERFTS IV SOL

## (undated) DEVICE — 4-PORT MANIFOLD: Brand: NEPTUNE 2